# Patient Record
Sex: MALE | Race: WHITE | NOT HISPANIC OR LATINO | Employment: OTHER | URBAN - NONMETROPOLITAN AREA
[De-identification: names, ages, dates, MRNs, and addresses within clinical notes are randomized per-mention and may not be internally consistent; named-entity substitution may affect disease eponyms.]

---

## 2018-06-25 ENCOUNTER — OFFICE VISIT (OUTPATIENT)
Dept: NEUROSURGERY | Facility: CLINIC | Age: 63
End: 2018-06-25

## 2018-06-25 VITALS — HEIGHT: 68 IN | BODY MASS INDEX: 44.56 KG/M2 | WEIGHT: 294 LBS

## 2018-06-25 DIAGNOSIS — M54.2 NECK PAIN: Primary | ICD-10-CM

## 2018-06-25 DIAGNOSIS — F17.200 SMOKER: ICD-10-CM

## 2018-06-25 DIAGNOSIS — G44.229 CHRONIC TENSION-TYPE HEADACHE, NOT INTRACTABLE: ICD-10-CM

## 2018-06-25 PROCEDURE — 99204 OFFICE O/P NEW MOD 45 MIN: CPT | Performed by: NURSE PRACTITIONER

## 2018-06-25 RX ORDER — CLOPIDOGREL BISULFATE 75 MG/1
75 TABLET ORAL DAILY
COMMUNITY

## 2018-06-25 RX ORDER — VALSARTAN 80 MG/1
80 TABLET ORAL DAILY
COMMUNITY
End: 2020-10-14 | Stop reason: HOSPADM

## 2018-06-25 RX ORDER — PREGABALIN 75 MG/1
75 CAPSULE ORAL
Status: ON HOLD | COMMUNITY
End: 2020-10-12

## 2018-06-25 RX ORDER — POTASSIUM CHLORIDE 20 MEQ/1
40 TABLET, EXTENDED RELEASE ORAL
Status: ON HOLD | COMMUNITY
End: 2020-10-12

## 2018-06-25 RX ORDER — SPIRONOLACTONE 25 MG/1
25 TABLET ORAL 2 TIMES DAILY
COMMUNITY
End: 2020-10-14 | Stop reason: HOSPADM

## 2018-06-25 RX ORDER — TRIAMTERENE AND HYDROCHLOROTHIAZIDE 37.5; 25 MG/1; MG/1
1 CAPSULE ORAL EVERY MORNING
COMMUNITY
End: 2020-10-14 | Stop reason: HOSPADM

## 2018-06-25 RX ORDER — METOPROLOL SUCCINATE 50 MG/1
50 TABLET, EXTENDED RELEASE ORAL
Status: ON HOLD | COMMUNITY
End: 2020-10-12

## 2018-06-25 RX ORDER — MULTIPLE VITAMINS W/ MINERALS TAB 9MG-400MCG
1 TAB ORAL DAILY
COMMUNITY

## 2018-06-25 RX ORDER — CYCLOBENZAPRINE HCL 10 MG
10 TABLET ORAL 3 TIMES DAILY PRN
Qty: 90 TABLET | Refills: 0 | Status: SHIPPED | OUTPATIENT
Start: 2018-06-25 | End: 2019-10-30

## 2018-06-25 RX ORDER — ASPIRIN 81 MG/1
81 TABLET ORAL
COMMUNITY
End: 2020-10-14 | Stop reason: HOSPADM

## 2018-06-25 RX ORDER — ATORVASTATIN CALCIUM 80 MG/1
80 TABLET, FILM COATED ORAL NIGHTLY
COMMUNITY

## 2018-06-25 RX ORDER — OMEPRAZOLE 20 MG/1
20 CAPSULE, DELAYED RELEASE ORAL DAILY
COMMUNITY

## 2018-06-25 NOTE — PATIENT INSTRUCTIONS

## 2018-06-25 NOTE — PROGRESS NOTES
"Neurosurgery Initial Patient Visit    Patient: Manuel Clark  : 1955    Primary Care Provider: JASSON Russell    Requesting Provider: JASSON Russell      History    Chief Complaint:   Chief Complaint   Patient presents with   • Neck Pain     Pt states that he has had pain for a couple of months that is getting worse.  He is having a hard time sleeping.  He denies any injury.  Pt denies any PM, PT or chicropractor       History of Present Illness: He is a 63-year-old  male who presents with chief complaint of \"sore neck.\"  He is referred by JASSON Russell, and we have been asked to see the patient in consultation for further evaluation.    He gives history of a more acute, insidious onset of neck pain for about 2 months.  He does not really give any prior history.  He seems to have a difficult time at night, when he is trying to go to sleep.  Lying down with his head on his pillow seems to make his pain much worse.  His pain is primarily in his neck and he shows me the left occiput and left lateral aspect is worse.  He sometimes feels a tingling sensation radiating into his left trapezius muscle.  He has been having some headache and feels these are very possible tension headaches.  He has been giving some thought to seeing a chiropractor, but wishes to have an x-ray of his neck done first.  He brings that in on disc for review.  He has been using over-the-counter pain medication typically using 4 Advil or Aleve tablets and for Tylenol daily.  He feels that this helps him better than an anti-inflammatory medicine he had been prescribed.  He is not at all interested in any type of pain medication as they have caused him to have very vivid dreams in the past.  He explains that he has some nerve damage in his left forearm and hand secondary to a compound fracture years ago.  Presently, he does not relate any other radicular features to either upper extremity.    Allergies:   " "Shellfish-derived products; Iodine; and Sulfa antibiotics    Past Medical History:    Past Medical History:   Diagnosis Date   • Arthritis    • Diabetes mellitus    • Diabetic neuropathy     both feet   • Hypertension    • Nerve damage     L forearm/hand secondary to compound fx       Past Surgical History:   Past Surgical History:   Procedure Laterality Date   • CARDIAC SURGERY      5 stents   • CARPAL TUNNEL RELEASE     • KNEE SURGERY Right    • OTHER SURGICAL HISTORY      compound fracture of the L arm       Medications:  Diovan, Lipitor, Metoprolol, Lantus, Plavix, Janumet, Lyrica, K-dur.  No current outpatient prescriptions on file prior to visit.     No current facility-administered medications on file prior to visit.         Social History:   reports that he has been smoking.  He has been smoking about 0.50 packs per day. He has never used smokeless tobacco. He reports that he drinks alcohol. He reports that he does not use drugs.  He is  and they live in [facility].  He is retired from being a truck and .  With regards to smoking cessation, he very truthfully answers, \"I doubt it.\"  With regards to alcohol use, he indicates this is more infrequent, approximately 2 drinks per month.    Family History:    Family History   Problem Relation Age of Onset   • Stroke Mother    • Cancer Mother         breast cancer x2   • Diabetes Mother    • Heart disease Mother    • Hypertension Mother    • Heart disease Father    • Arthritis Father    • Hypertension Father        Review of Systems:  Review of Systems   Constitutional: Negative for chills, fever and unexpected weight change.   HENT: Positive for hearing loss and postnasal drip. Negative for congestion, rhinorrhea, sore throat and tinnitus.    Eyes: Negative for photophobia and visual disturbance.   Respiratory: Positive for shortness of breath and wheezing. Negative for cough.    Cardiovascular: Positive for leg swelling. Negative " for chest pain and palpitations.   Gastrointestinal: Negative for constipation, diarrhea, nausea and vomiting.   Genitourinary: Negative for difficulty urinating.   Musculoskeletal: Positive for neck pain and neck stiffness. Negative for arthralgias, back pain and gait problem.   Skin: Negative for rash.   Allergic/Immunologic: Negative for environmental allergies.   Neurological: Positive for headaches. Negative for seizures and numbness.   Hematological: Does not bruise/bleed easily.   Psychiatric/Behavioral: Negative for confusion. The patient is not nervous/anxious.    All other systems reviewed and are negative.        Neurological Physical Examination    Physical Exam   Constitutional: He is oriented to person, place, and time. He appears well-developed. No distress.   He is generally pleasant and cooperative, in no acute distress.  He is a bit over nourished.   HENT:   Head: Normocephalic and atraumatic.   Eyes: No scleral icterus.   Neck: Neck supple. No tracheal deviation and normal range of motion present.   Cardiovascular: Normal rate, regular rhythm and normal heart sounds.    No murmur heard.  No murmur or carotid bruit appreciated.  Heart sounds are more distant.   Pulmonary/Chest: Effort normal and breath sounds normal. No respiratory distress. He has no wheezes.   Lung fields are congested with scattered rhonchi that clear with cough.  They are then more coarse, but generally clear throughout.   Musculoskeletal:   Cervical range of motion is limited and elicits a sensation of crepitus with all movement.  There is very good, symmetric strength of both upper extremities.  No focal weakness appreciated.  Deep tendon reflexes diminished bilaterally.  Elo's is negative.   Neurological: He is alert and oriented to person, place, and time. He displays normal reflexes. No cranial nerve deficit.   Optic discs not visualized.   Skin: Skin is warm and dry. No rash noted.   Psychiatric: He has a normal mood  and affect. His speech is normal and behavior is normal. Cognition and memory are normal.   Vitals reviewed.       Medical Decision Making    Management Options:  In the office we have discussed his care.  I have reviewed a cervical x-ray on disc.  This primarily shows some arthritic and degenerative change, more mild.  This appears to be more prominent at the C5 6 and C6 7 levels.  Per radiology report, there is also incidental notation of carotid artery calcification.    We have discussed the findings as well as continued plan of care.  He tells me that his wife has had significant issues with her neck as well as multiple surgeries, so he is somewhat familiar.  He also wishes to be very conservative in his care.  He has already been thinking about seeing a chiropractor, but again, had wished to have an x-ray reviewed first.  We have ultimately agreed on a prescription for physical therapy which we will order 3 days a week for the next 4 weeks.  Depending upon his response, then he may want to try chiropractic treatment and this is certainly reasonable.  We have also reviewed medications.  He is most comfortable continuing something over-the-counter and I am advised him to take no more than what he is presently using.  So hoping for this to be a shorter term needed.  We have talked about adding a muscle relaxer to use as needed and he is agreeable.  We will try Flexeril.  As far as the finding of calcification of the carotid arteries, the patient is already aware and is planning to talk to his cardiologist as his upcoming, routine appointment later this week.  From our standpoint, we will see him back within 4-6 weeks to follow-up from conservative treatment of therapy and medication.  If he should have any new or worsening problems, I have asked him to let us know and we will move his appointment to a sooner date.  This is all agreeable.    Imaging is here in the office on file for future review as  needed.    Information regarding BMI and smoking cessation has been given in an effort to help support overall health and wellness.    Manuel was seen today for neck pain.    Diagnoses and all orders for this visit:    Neck pain  -     Ambulatory Referral to Physical Therapy Evaluate and treat (3x a week for 4 weeks), Neuro, Ortho  -     cyclobenzaprine (FLEXERIL) 10 MG tablet; Take 1 tablet by mouth 3 (Three) Times a Day As Needed for Muscle Spasms.    Chronic tension-type headache, not intractable  -     Ambulatory Referral to Physical Therapy Evaluate and treat (3x a week for 4 weeks), Neuro, Ortho  -     cyclobenzaprine (FLEXERIL) 10 MG tablet; Take 1 tablet by mouth 3 (Three) Times a Day As Needed for Muscle Spasms.    BMI 40.0-44.9, adult    Smoker        Thank you, No ref. provider found for this Consultation and the opportunity to participate in the care of this pleasant patient.

## 2019-09-03 ENCOUNTER — TRANSCRIBE ORDERS (OUTPATIENT)
Dept: ADMINISTRATIVE | Facility: HOSPITAL | Age: 64
End: 2019-09-03

## 2019-09-03 DIAGNOSIS — G58.9 PINCHED NERVE IN NECK: Primary | ICD-10-CM

## 2019-09-06 ENCOUNTER — HOSPITAL ENCOUNTER (OUTPATIENT)
Dept: MRI IMAGING | Facility: HOSPITAL | Age: 64
Discharge: HOME | End: 2019-09-06

## 2019-09-06 DIAGNOSIS — G58.9 PINCHED NERVE IN NECK: ICD-10-CM

## 2019-09-16 ENCOUNTER — HOSPITAL ENCOUNTER (OUTPATIENT)
Dept: MRI IMAGING | Facility: HOSPITAL | Age: 64
Discharge: HOME | End: 2019-09-16
Admitting: NURSE PRACTITIONER

## 2019-09-16 PROCEDURE — 72141 MRI NECK SPINE W/O DYE: CPT

## 2019-10-30 ENCOUNTER — OFFICE VISIT (OUTPATIENT)
Dept: NEUROSURGERY | Facility: CLINIC | Age: 64
End: 2019-10-30

## 2019-10-30 ENCOUNTER — HOSPITAL ENCOUNTER (OUTPATIENT)
Dept: GENERAL RADIOLOGY | Facility: HOSPITAL | Age: 64
Discharge: HOME | End: 2019-10-30
Admitting: NURSE PRACTITIONER

## 2019-10-30 VITALS
SYSTOLIC BLOOD PRESSURE: 128 MMHG | WEIGHT: 289.6 LBS | BODY MASS INDEX: 43.89 KG/M2 | HEIGHT: 68 IN | DIASTOLIC BLOOD PRESSURE: 85 MMHG

## 2019-10-30 DIAGNOSIS — F17.200 SMOKER: ICD-10-CM

## 2019-10-30 DIAGNOSIS — M48.02 SPINAL STENOSIS IN CERVICAL REGION: ICD-10-CM

## 2019-10-30 DIAGNOSIS — M48.02 SPINAL STENOSIS IN CERVICAL REGION: Primary | ICD-10-CM

## 2019-10-30 PROCEDURE — 99214 OFFICE O/P EST MOD 30 MIN: CPT | Performed by: NURSE PRACTITIONER

## 2019-10-30 PROCEDURE — 72052 X-RAY EXAM NECK SPINE 6/>VWS: CPT

## 2019-10-30 NOTE — PROGRESS NOTES
Chief complaint:   Chief Complaint   Patient presents with   • Neck Pain     Manuel is returning to our office today after an MRI of his cervical in September here at [facility] for follow up for his neck pain with left shoulder and left arm and hand pain with numbness.  No recent physical therapy.         Subjective     HPI: This is a 64-year-old male gentleman who was referred to us by JASSON Mccollum for neck and left arm pain.  He is here to be evaluated today.  He states that the pain in his neck has been going on for over a year and has been progressively getting worse.  The pain is constant.  It is worse with looking down and better with certain positions.  He has pain that will radiate down his left arm in a radicular fashion to his hand.  This pain is constant and has the same modifying factors.  He denies any bowel or bladder incontinence.  He has not done any recent physical therapy, chiropractic care, or pain management injections.  Rates his pain on a scale of 0-10 out of 7.  He says it does interfere with his actives of daily living.  He is right-hand dominant.  He is retired.  He is .  He does smoke half a pack of cigarettes a day.  He drinks rarely.  Denies any illicit drug use.  He does use a cane but states this is for knee issue.  Medical history includes diabetes along with congestive heart failure and hypertension.    Review of Systems   Constitutional: Positive for fatigue.   HENT: Positive for postnasal drip.    Respiratory: Positive for apnea, shortness of breath and wheezing.    Cardiovascular: Positive for leg swelling.   Endocrine: Positive for cold intolerance.   Musculoskeletal: Positive for neck pain and neck stiffness.   Neurological: Positive for numbness and headaches.   Psychiatric/Behavioral: Positive for sleep disturbance.   All other systems reviewed and are negative.       Past Medical History:   Diagnosis Date   • Arthritis    • Diabetes mellitus (CMS/[facility])    • Diabetic  "neuropathy (CMS/HCC)     both feet   • Hypertension    • Nerve damage     L forearm/hand secondary to compound fx     Past Surgical History:   Procedure Laterality Date   • CARDIAC SURGERY      5 stents   • CARPAL TUNNEL RELEASE     • KNEE SURGERY Right    • OTHER SURGICAL HISTORY      compound fracture of the L arm     Family History   Problem Relation Age of Onset   • Stroke Mother    • Cancer Mother         breast cancer x2   • Diabetes Mother    • Heart disease Mother    • Hypertension Mother    • Heart disease Father    • Arthritis Father    • Hypertension Father      Social History     Tobacco Use   • Smoking status: Current Every Day Smoker     Packs/day: 0.50   • Smokeless tobacco: Never Used   Substance Use Topics   • Alcohol use: Yes     Comment: 2 drinks per month   • Drug use: No       (Not in a hospital admission)  Allergies:  Shellfish-derived products; Iodine; and Sulfa antibiotics    Objective      Vital Signs  /85 (BP Location: Right arm, Patient Position: Sitting)   Ht 172.7 cm (68\")   Wt 131 kg (289 lb 9.6 oz)   BMI 44.03 kg/m²     Physical Exam   Constitutional: He is oriented to person, place, and time. He appears well-developed and well-nourished.   HENT:   Head: Normocephalic.   Eyes: Conjunctivae, EOM and lids are normal. Pupils are equal, round, and reactive to light.   Neck: Normal range of motion.   Cardiovascular: Normal rate, regular rhythm and normal heart sounds.   Pulmonary/Chest: Effort normal and breath sounds normal.   Abdominal: Normal appearance.   Musculoskeletal: Normal range of motion.        Cervical back: He exhibits pain.   Neurological: He is alert and oriented to person, place, and time. He has normal strength and normal reflexes. He displays normal reflexes. No cranial nerve deficit or sensory deficit. GCS eye subscore is 4. GCS verbal subscore is 5. GCS motor subscore is 6.   Skin: Skin is warm.   Psychiatric: He has a normal mood and affect. His speech is " normal and behavior is normal. Thought content normal. Cognition and memory are normal.       Results Review: MRI of the cervical spine that was done on September 16, 2019 shows the patient does have bilateral neuroforaminal narrowing at C3-4.  At C6-7 there is mild central canal narrowing with bilateral neuroforaminal narrowing as well.  There is loss of the normal cervical lordosis.  No fracture visualized.  No cord signal change        Assessment/Plan:   For first line conservative care of cervical pain, I would like to send Mr. Clakr for a dedicated course of physician directed physical therapy consisting of 2-3 times a week for 4-6 weeks.  I am also going to send him for set of x-rays of his cervical spine to include standing flexion and extension.  I am also going to set him up to go to pain management to discuss pain management with possibly both oral pain medication and injections with Dr. Marlene Peter.  The patient thinks that he would like for surgery to be a last resort due to some of his other medical issues that he is dealing with and he does not feel that his cardiologist would clear him for surgical clearance at this time.    Return for reassessment with Dr. Flowers      I advised the patient to call and return sooner for new or worsening complaints of weakness, paresthesias, gait disturbances, or any additional concerns.  Treatment options discussed in detail with Manuel and the patient voiced understanding.  Mr. Clark agrees with this plan of care.     Patient is a smoker.  Smoking cessation classes given to the patient  BMI shows the patient is Obese. BMI chart was given to the patient.     Manuel was seen today for neck pain.    Diagnoses and all orders for this visit:    Spinal stenosis in cervical region  -     Ambulatory Referral to Physical Therapy Evaluate and treat (2-3 days a week for 4-6 weeks )  -     XR Spine Cervical Complete With Obli Flex Ext; Future  -     Ambulatory Referral  to Pain Management    BMI 40.0-44.9, adult (CMS/HCC)    Smoker          I discussed the patients findings and my recommendations with patient    Jj Barlow, APRN  10/30/19  1:44 PM

## 2019-10-30 NOTE — PATIENT INSTRUCTIONS
Steps to Quit Smoking    Smoking tobacco can be bad for your health. It can also affect almost every organ in your body. Smoking puts you and people around you at risk for many serious long-lasting (chronic) diseases. Quitting smoking is hard, but it is one of the best things that you can do for your health. It is never too late to quit.  What are the benefits of quitting smoking?  When you quit smoking, you lower your risk for getting serious diseases and conditions. They can include:  · Lung cancer or lung disease.  · Heart disease.  · Stroke.  · Heart attack.  · Not being able to have children (infertility).  · Weak bones (osteoporosis) and broken bones (fractures).  If you have coughing, wheezing, and shortness of breath, those symptoms may get better when you quit. You may also get sick less often. If you are pregnant, quitting smoking can help to lower your chances of having a baby of low birth weight.  What can I do to help me quit smoking?  Talk with your doctor about what can help you quit smoking. Some things you can do (strategies) include:  · Quitting smoking totally, instead of slowly cutting back how much you smoke over a period of time.  · Going to in-person counseling. You are more likely to quit if you go to many counseling sessions.  · Using resources and support systems, such as:  ? Online chats with a counselor.  ? Phone quitlines.  ? Printed self-help materials.  ? Support groups or group counseling.  ? Text messaging programs.  ? Mobile phone apps or applications.  · Taking medicines. Some of these medicines may have nicotine in them. If you are pregnant or breastfeeding, do not take any medicines to quit smoking unless your doctor says it is okay. Talk with your doctor about counseling or other things that can help you.  Talk with your doctor about using more than one strategy at the same time, such as taking medicines while you are also going to in-person counseling. This can help make  quitting easier.  What things can I do to make it easier to quit?  Quitting smoking might feel very hard at first, but there is a lot that you can do to make it easier. Take these steps:  · Talk to your family and friends. Ask them to support and encourage you.  · Call phone quitlines, reach out to support groups, or work with a counselor.  · Ask people who smoke to not smoke around you.  · Avoid places that make you want (trigger) to smoke, such as:  ? Bars.  ? Parties.  ? Smoke-break areas at work.  · Spend time with people who do not smoke.  · Lower the stress in your life. Stress can make you want to smoke. Try these things to help your stress:  ? Getting regular exercise.  ? Deep-breathing exercises.  ? Yoga.  ? Meditating.  ? Doing a body scan. To do this, close your eyes, focus on one area of your body at a time from head to toe, and notice which parts of your body are tense. Try to relax the muscles in those areas.  · Download or buy apps on your mobile phone or tablet that can help you stick to your quit plan. There are many free apps, such as QuitGuide from the CDC (Centers for Disease Control and Prevention). You can find more support from smokefree.gov and other websites.  This information is not intended to replace advice given to you by your health care provider. Make sure you discuss any questions you have with your health care provider.  Document Released: 10/14/2010 Document Revised: 08/15/2017 Document Reviewed: 05/03/2016  [employer] Interactive Patient Education © 2019 [employer]  BMI for Adults    Body mass index (BMI) is a number that is calculated from a person's weight and height. BMI may help to estimate how much of a person's weight is composed of fat. BMI can help identify those who may be at higher risk for certain medical problems.  How is BMI used with adults?  BMI is used as a screening tool to identify possible weight problems. It is used to check whether a person is obese,  "overweight, healthy weight, or underweight.  How is BMI calculated?  BMI measures your weight and compares it to your height. This can be done either in English ([place] or metric measurements. Note that charts are available to help you find your BMI quickly and easily without having to do these calculations yourself.  To calculate your BMI in English ([place] measurements, your health care provider will:  1. Measure your weight in pounds (lb).  2. Multiply the number of pounds by 703.  ? For example, for a person who weighs 180 lb, multiply that number by 703, which equals 126,540.  3. Measure your height in inches (in). Then multiply that number by itself to get a measurement called \"inches squared.\"  ? For example, for a person who is 70 in tall, the \"inches squared\" measurement is 70 in x 70 in, which equals 4900 inches squared.  4. Divide the total from Step 2 (number of lb x 703) by the total from Step 3 (inches squared): 126,540 ÷ 4900 = 25.8. This is your BMI.  To calculate your BMI in metric measurements, your health care provider will:  1. Measure your weight in kilograms (kg).  2. Measure your height in meters (m). Then multiply that number by itself to get a measurement called \"meters squared.\"  ? For example, for a person who is 1.75 m tall, the \"meters squared\" measurement is 1.75 m x 1.75 m, which is equal to 3.1 meters squared.  3. Divide the number of kilograms (your weight) by the meters squared number. In this example: 70 ÷ 3.1 = 22.6. This is your BMI.  How is BMI interpreted?  To interpret your results, your health care provider will use BMI charts to identify whether you are underweight, normal weight, overweight, or obese. The following guidelines will be used:  · Underweight: BMI less than 18.5.  · Normal weight: BMI between 18.5 and 24.9.  · Overweight: BMI between 25 and 29.9.  · Obese: BMI of 30 and above.  Please note:  · Weight includes both fat and muscle, so someone with a muscular build, " such as an athlete, may have a BMI that is higher than 24.9. In cases like these, BMI is not an accurate measure of body fat.  · To determine if excess body fat is the cause of a BMI of 25 or higher, further assessments may need to be done by a health care provider.  · BMI is usually interpreted in the same way for men and women.  Why is BMI a useful tool?  BMI is useful in two ways:  · Identifying a weight problem that may be related to a medical condition, or that may increase the risk for medical problems.  · Promoting lifestyle and diet changes in order to reach a healthy weight.  Summary  · Body mass index (BMI) is a number that is calculated from a person's weight and height.  · BMI may help to estimate how much of a person's weight is composed of fat. BMI can help identify those who may be at higher risk for certain medical problems.  · BMI can be measured using English measurements or metric measurements.  · To interpret your results, your health care provider will use BMI charts to identify whether you are underweight, normal weight, overweight, or obese.  This information is not intended to replace advice given to you by your health care provider. Make sure you discuss any questions you have with your health care provider.  Document Released: 08/29/2005 Document Revised: 10/31/2018 Document Reviewed: 10/31/2018  [employer] Interactive Patient Education © 2019 [employer]

## 2020-02-06 ENCOUNTER — OFFICE VISIT (OUTPATIENT)
Dept: NEUROSURGERY | Facility: CLINIC | Age: 65
End: 2020-02-06

## 2020-02-06 VITALS
BODY MASS INDEX: 44.41 KG/M2 | SYSTOLIC BLOOD PRESSURE: 140 MMHG | WEIGHT: 293 LBS | HEIGHT: 68 IN | DIASTOLIC BLOOD PRESSURE: 82 MMHG

## 2020-02-06 DIAGNOSIS — R20.2 NUMBNESS AND TINGLING OF LEFT UPPER EXTREMITY: ICD-10-CM

## 2020-02-06 DIAGNOSIS — M48.02 SPINAL STENOSIS IN CERVICAL REGION: Primary | ICD-10-CM

## 2020-02-06 DIAGNOSIS — R20.0 NUMBNESS AND TINGLING OF LEFT UPPER EXTREMITY: ICD-10-CM

## 2020-02-06 DIAGNOSIS — F17.200 SMOKER: ICD-10-CM

## 2020-02-06 PROCEDURE — 99214 OFFICE O/P EST MOD 30 MIN: CPT | Performed by: NEUROLOGICAL SURGERY

## 2020-02-06 RX ORDER — FENOFIBRIC ACID 135 MG/1
135 CAPSULE, DELAYED RELEASE ORAL DAILY
COMMUNITY
Start: 2019-11-25

## 2020-02-06 RX ORDER — FUROSEMIDE 40 MG/1
40 TABLET ORAL DAILY
COMMUNITY
Start: 2019-12-16 | End: 2020-05-12

## 2020-02-06 RX ORDER — TRAMADOL HYDROCHLORIDE 50 MG/1
TABLET ORAL
COMMUNITY
Start: 2019-11-15 | End: 2020-02-06

## 2020-02-06 RX ORDER — OMEGA-3-ACID ETHYL ESTERS 1 G/1
1 CAPSULE, LIQUID FILLED ORAL 4 TIMES DAILY
COMMUNITY
Start: 2019-12-16 | End: 2020-10-14 | Stop reason: HOSPADM

## 2020-02-06 RX ORDER — ERTUGLIFLOZIN 5 MG/1
5 TABLET, FILM COATED ORAL DAILY
COMMUNITY
Start: 2019-11-28 | End: 2020-05-12

## 2020-02-06 RX ORDER — TIZANIDINE 4 MG/1
4 TABLET ORAL 3 TIMES DAILY PRN
Status: ON HOLD | COMMUNITY
Start: 2019-12-16 | End: 2020-10-12

## 2020-02-06 NOTE — PATIENT INSTRUCTIONS
"PATIENT TO CONTINUE TO FOLLOW UP WITH HIS PRIMARY CARE PROVIDER FOR YEARLY PHYSICAL EXAMS TO ENSURE COMPLETE HEALTH MAINTENANCE        BMI for Adults    Body mass index (BMI) is a number that is calculated from a person's weight and height. BMI may help to estimate how much of a person's weight is composed of fat. BMI can help identify those who may be at higher risk for certain medical problems.  How is BMI used with adults?  BMI is used as a screening tool to identify possible weight problems. It is used to check whether a person is obese, overweight, healthy weight, or underweight.  How is BMI calculated?  BMI measures your weight and compares it to your height. This can be done either in English ([place] or metric measurements. Note that charts are available to help you find your BMI quickly and easily without having to do these calculations yourself.  To calculate your BMI in English ([place] measurements, your health care provider will:  1. Measure your weight in pounds (lb).  2. Multiply the number of pounds by 703.  ? For example, for a person who weighs 180 lb, multiply that number by 703, which equals 126,540.  3. Measure your height in inches (in). Then multiply that number by itself to get a measurement called \"inches squared.\"  ? For example, for a person who is 70 in tall, the \"inches squared\" measurement is 70 in x 70 in, which equals 4900 inches squared.  4. Divide the total from Step 2 (number of lb x 703) by the total from Step 3 (inches squared): 126,540 ÷ 4900 = 25.8. This is your BMI.  To calculate your BMI in metric measurements, your health care provider will:  1. Measure your weight in kilograms (kg).  2. Measure your height in meters (m). Then multiply that number by itself to get a measurement called \"meters squared.\"  ? For example, for a person who is 1.75 m tall, the \"meters squared\" measurement is 1.75 m x 1.75 m, which is equal to 3.1 meters squared.  3. Divide the number of kilograms " (your weight) by the meters squared number. In this example: 70 ÷ 3.1 = 22.6. This is your BMI.  How is BMI interpreted?  To interpret your results, your health care provider will use BMI charts to identify whether you are underweight, normal weight, overweight, or obese. The following guidelines will be used:  · Underweight: BMI less than 18.5.  · Normal weight: BMI between 18.5 and 24.9.  · Overweight: BMI between 25 and 29.9.  · Obese: BMI of 30 and above.  Please note:  · Weight includes both fat and muscle, so someone with a muscular build, such as an athlete, may have a BMI that is higher than 24.9. In cases like these, BMI is not an accurate measure of body fat.  · To determine if excess body fat is the cause of a BMI of 25 or higher, further assessments may need to be done by a health care provider.  · BMI is usually interpreted in the same way for men and women.  Why is BMI a useful tool?  BMI is useful in two ways:  · Identifying a weight problem that may be related to a medical condition, or that may increase the risk for medical problems.  · Promoting lifestyle and diet changes in order to reach a healthy weight.  Summary  · Body mass index (BMI) is a number that is calculated from a person's weight and height.  · BMI may help to estimate how much of a person's weight is composed of fat. BMI can help identify those who may be at higher risk for certain medical problems.  · BMI can be measured using English measurements or metric measurements.  · To interpret your results, your health care provider will use BMI charts to identify whether you are underweight, normal weight, overweight, or obese.  This information is not intended to replace advice given to you by your health care provider. Make sure you discuss any questions you have with your health care provider.  Document Released: 08/29/2005 Document Revised: 10/31/2018 Document Reviewed: 10/31/2018  [employer]vier Interactive Patient Education © 2019  [employer]        Steps to Quit Smoking    Smoking tobacco can be harmful to your health and can affect almost every organ in your body. Smoking puts you, and those around you, at risk for developing many serious chronic diseases. Quitting smoking is difficult, but it is one of the best things that you can do for your health. It is never too late to quit.  What are the benefits of quitting smoking?  When you quit smoking, you lower your risk of developing serious diseases and conditions, such as:  · Lung cancer or lung disease, such as COPD.  · Heart disease.  · Stroke.  · Heart attack.  · Infertility.  · Osteoporosis and bone fractures.  Additionally, symptoms such as coughing, wheezing, and shortness of breath may get better when you quit. You may also find that you get sick less often because your body is stronger at fighting off colds and infections. If you are pregnant, quitting smoking can help to reduce your chances of having a baby of low birth weight.  How do I get ready to quit?  When you decide to quit smoking, create a plan to make sure that you are successful. Before you quit:  · Pick a date to quit. Set a date within the next two weeks to give you time to prepare.  · Write down the reasons why you are quitting. Keep this list in places where you will see it often, such as on your bathroom mirror or in your car or wallet.  · Identify the people, places, things, and activities that make you want to smoke (triggers) and avoid them. Make sure to take these actions:  ? Throw away all cigarettes at home, at work, and in your car.  ? Throw away smoking accessories, such as ashtrays and lighters.  ? Clean your car and make sure to empty the ashtray.  ? Clean your home, including curtains and carpets.  · Tell your family, friends, and coworkers that you are quitting. Support from your loved ones can make quitting easier.  · Talk with your health care provider about your options for quitting smoking.  · Find  out what treatment options are covered by your health insurance.  What strategies can I use to quit smoking?  Talk with your healthcare provider about different strategies to quit smoking. Some strategies include:  · Quitting smoking altogether instead of gradually lessening how much you smoke over a period of time. Research shows that quitting “cold turkey” is more successful than gradually quitting.  · Attending in-person counseling to help you build problem-solving skills. You are more likely to have success in quitting if you attend several counseling sessions. Even short sessions of 10 minutes can be effective.  · Finding resources and support systems that can help you to quit smoking and remain smoke-free after you quit. These resources are most helpful when you use them often. They can include:  ? Online chats with a counselor.  ? Telephone quitlines.  ? Printed self-help materials.  ? Support groups or group counseling.  ? Text messaging programs.  ? Mobile phone applications.  · Taking medicines to help you quit smoking. (If you are pregnant or breastfeeding, talk with your health care provider first.) Some medicines contain nicotine and some do not. Both types of medicines help with cravings, but the medicines that include nicotine help to relieve withdrawal symptoms. Your health care provider may recommend:  ? Nicotine patches, gum, or lozenges.  ? Nicotine inhalers or sprays.  ? Non-nicotine medicine that is taken by mouth.  Talk with your health care provider about combining strategies, such as taking medicines while you are also receiving in-person counseling. Using these two strategies together makes you more likely to succeed in quitting than if you used either strategy on its own.  If you are pregnant or breastfeeding, talk with your health care provider about finding counseling or other support strategies to quit smoking. Do not take medicine to help you quit smoking unless told to do so by your  health care provider.  What things can I do to make it easier to quit?  Quitting smoking might feel overwhelming at first, but there is a lot that you can do to make it easier. Take these important actions:  · Reach out to your family and friends and ask that they support and encourage you during this time. Call telephone quitlines, reach out to support groups, or work with a counselor for support.  · Ask people who smoke to avoid smoking around you.  · Avoid places that trigger you to smoke, such as bars, parties, or smoke-break areas at work.  · Spend time around people who do not smoke.  · Lessen stress in your life, because stress can be a smoking trigger for some people. To lessen stress, try:  ? Exercising regularly.  ? Deep-breathing exercises.  ? Yoga.  ? Meditating.  ? Performing a body scan. This involves closing your eyes, scanning your body from head to toe, and noticing which parts of your body are particularly tense. Purposefully relax the muscles in those areas.  · Download or purchase mobile phone or tablet apps (applications) that can help you stick to your quit plan by providing reminders, tips, and encouragement. There are many free apps, such as QuitGuide from the CDC (Centers for Disease Control and Prevention). You can find other support for quitting smoking (smoking cessation) through smokefree.gov and other websites.  How will I feel when I quit smoking?  Within the first 24 hours of quitting smoking, you may start to feel some withdrawal symptoms. These symptoms are usually most noticeable 2-3 days after quitting, but they usually do not last beyond 2-3 weeks. Changes or symptoms that you might experience include:  · Mood swings.  · Restlessness, anxiety, or irritation.  · Difficulty concentrating.  · Dizziness.  · Strong cravings for sugary foods in addition to nicotine.  · Mild weight gain.  · Constipation.  · Nausea.  · Coughing or a sore throat.  · Changes in how your medicines work in  your body.  · A depressed mood.  · Difficulty sleeping (insomnia).  After the first 2-3 weeks of quitting, you may start to notice more positive results, such as:  · Improved sense of smell and taste.  · Decreased coughing and sore throat.  · Slower heart rate.  · Lower blood pressure.  · Clearer skin.  · The ability to breathe more easily.  · Fewer sick days.  Quitting smoking is very challenging for most people. Do not get discouraged if you are not successful the first time. Some people need to make many attempts to quit before they achieve long-term success. Do your best to stick to your quit plan, and talk with your health care provider if you have any questions or concerns.  This information is not intended to replace advice given to you by your health care provider. Make sure you discuss any questions you have with your health care provider.  Document Released: 12/12/2002 Document Revised: 07/24/2018 Document Reviewed: 05/03/2016  [employer] Interactive Patient Education © 2019 [employer]

## 2020-02-25 ENCOUNTER — HOSPITAL ENCOUNTER (OUTPATIENT)
Dept: NEUROLOGY | Facility: HOSPITAL | Age: 65
Discharge: HOME | End: 2020-02-25
Admitting: NEUROLOGICAL SURGERY

## 2020-02-25 PROCEDURE — 95909 NRV CNDJ TST 5-6 STUDIES: CPT

## 2020-02-25 PROCEDURE — 95886 MUSC TEST DONE W/N TEST COMP: CPT

## 2020-03-16 ENCOUNTER — TELEPHONE (OUTPATIENT)
Dept: NEUROSURGERY | Facility: CLINIC | Age: 65
End: 2020-03-16

## 2020-03-16 NOTE — TELEPHONE ENCOUNTER
PATIENT CALLED REGARDING THE FOLLOWING APPOINTMENT.  Type: FOLLOW-UP EX   Date: 04/15 AT 9:45AM  Provider: SANDRA GIPSON  Caller: PATIENT  Phone Number: [phone]  Reason: PATIENT CALLED TO RESCHEDULE INITIAL APPT TO A LATER DATE BUT PREFERS TO RECEIVE IMAGING RESULTS AND NEXT STEP OPTIONS OVER THE PHONE IF APPLICABLE.  Availability for callback: ANYTIME  Preferred dates for scheduling: NONE    PLEASE CONTACT PATIENT FOR FURTHER INSTRUCTIONS.

## 2020-04-09 ENCOUNTER — TELEPHONE (OUTPATIENT)
Dept: NEUROSURGERY | Facility: CLINIC | Age: 65
End: 2020-04-09

## 2020-04-09 NOTE — TELEPHONE ENCOUNTER
Jj has requested to get this patient set up on a Dr. Flowers day for a tele video visit thru his My Chart, there was no answer and I have left him a message for a call back.

## 2020-04-09 NOTE — TELEPHONE ENCOUNTER
Patient returned my call and this tele video visit has been scheduled for 04/14/2020 at 9:30 am.

## 2020-04-13 ENCOUNTER — TELEPHONE (OUTPATIENT)
Dept: NEUROSURGERY | Facility: CLINIC | Age: 65
End: 2020-04-13

## 2020-04-13 NOTE — TELEPHONE ENCOUNTER
CALLED MR. TATE TO CONFIRM THAT HE HAS DOWNLOADED [employer] [facility] ON PHONE - HE SAID YES - EXPLAINED PROCEDURE.   TOLD HIM TO CALL IF HE HAD ANY PROBLEMS.

## 2020-04-14 ENCOUNTER — TELEMEDICINE (OUTPATIENT)
Dept: NEUROSURGERY | Facility: CLINIC | Age: 65
End: 2020-04-14

## 2020-04-14 VITALS — WEIGHT: 293 LBS | BODY MASS INDEX: 44.41 KG/M2 | HEIGHT: 68 IN

## 2020-04-14 DIAGNOSIS — R20.2 NUMBNESS AND TINGLING OF LEFT UPPER EXTREMITY: ICD-10-CM

## 2020-04-14 DIAGNOSIS — F17.200 SMOKER: ICD-10-CM

## 2020-04-14 DIAGNOSIS — M48.02 CERVICAL SPINAL STENOSIS: Primary | ICD-10-CM

## 2020-04-14 DIAGNOSIS — G56.23 CUBITAL TUNNEL SYNDROME OF BOTH UPPER EXTREMITIES: ICD-10-CM

## 2020-04-14 DIAGNOSIS — R20.0 NUMBNESS AND TINGLING OF LEFT UPPER EXTREMITY: ICD-10-CM

## 2020-04-14 PROCEDURE — 99213 OFFICE O/P EST LOW 20 MIN: CPT | Performed by: NURSE PRACTITIONER

## 2020-04-14 NOTE — PATIENT INSTRUCTIONS
PATIENT TO CONTINUE TO FOLLOW UP WITH HIS/HER PRIMARY CARE PROVIDER FOR YEARLY PHYSICAL EXAMS TO ENSURE COMPLETE HEALTH MAINTENANCE    Steps to Quit Smoking    Smoking tobacco can be harmful to your health and can affect almost every organ in your body. Smoking puts you, and those around you, at risk for developing many serious chronic diseases. Quitting smoking is difficult, but it is one of the best things that you can do for your health. It is never too late to quit.  What are the benefits of quitting smoking?  When you quit smoking, you lower your risk of developing serious diseases and conditions, such as:  · Lung cancer or lung disease, such as COPD.  · Heart disease.  · Stroke.  · Heart attack.  · Infertility.  · Osteoporosis and bone fractures.  Additionally, symptoms such as coughing, wheezing, and shortness of breath may get better when you quit. You may also find that you get sick less often because your body is stronger at fighting off colds and infections. If you are pregnant, quitting smoking can help to reduce your chances of having a baby of low birth weight.  How do I get ready to quit?  When you decide to quit smoking, create a plan to make sure that you are successful. Before you quit:  · Pick a date to quit. Set a date within the next two weeks to give you time to prepare.  · Write down the reasons why you are quitting. Keep this list in places where you will see it often, such as on your bathroom mirror or in your car or wallet.  · Identify the people, places, things, and activities that make you want to smoke (triggers) and avoid them. Make sure to take these actions:  ? Throw away all cigarettes at home, at work, and in your car.  ? Throw away smoking accessories, such as ashtrays and lighters.  ? Clean your car and make sure to empty the ashtray.  ? Clean your home, including curtains and carpets.  · Tell your family, friends, and coworkers that you are quitting. Support from your loved ones  can make quitting easier.  · Talk with your health care provider about your options for quitting smoking.  · Find out what treatment options are covered by your health insurance.  What strategies can I use to quit smoking?  Talk with your healthcare provider about different strategies to quit smoking. Some strategies include:  · Quitting smoking altogether instead of gradually lessening how much you smoke over a period of time. Research shows that quitting “cold turkey” is more successful than gradually quitting.  · Attending in-person counseling to help you build problem-solving skills. You are more likely to have success in quitting if you attend several counseling sessions. Even short sessions of 10 minutes can be effective.  · Finding resources and support systems that can help you to quit smoking and remain smoke-free after you quit. These resources are most helpful when you use them often. They can include:  ? Online chats with a counselor.  ? Telephone quitlines.  ? Printed self-help materials.  ? Support groups or group counseling.  ? Text messaging programs.  ? Mobile phone applications.  · Taking medicines to help you quit smoking. (If you are pregnant or breastfeeding, talk with your health care provider first.) Some medicines contain nicotine and some do not. Both types of medicines help with cravings, but the medicines that include nicotine help to relieve withdrawal symptoms. Your health care provider may recommend:  ? Nicotine patches, gum, or lozenges.  ? Nicotine inhalers or sprays.  ? Non-nicotine medicine that is taken by mouth.  Talk with your health care provider about combining strategies, such as taking medicines while you are also receiving in-person counseling. Using these two strategies together makes you more likely to succeed in quitting than if you used either strategy on its own.  If you are pregnant or breastfeeding, talk with your health care provider about finding counseling or other  support strategies to quit smoking. Do not take medicine to help you quit smoking unless told to do so by your health care provider.  What things can I do to make it easier to quit?  Quitting smoking might feel overwhelming at first, but there is a lot that you can do to make it easier. Take these important actions:  · Reach out to your family and friends and ask that they support and encourage you during this time. Call telephone quitlines, reach out to support groups, or work with a counselor for support.  · Ask people who smoke to avoid smoking around you.  · Avoid places that trigger you to smoke, such as bars, parties, or smoke-break areas at work.  · Spend time around people who do not smoke.  · Lessen stress in your life, because stress can be a smoking trigger for some people. To lessen stress, try:  ? Exercising regularly.  ? Deep-breathing exercises.  ? Yoga.  ? Meditating.  ? Performing a body scan. This involves closing your eyes, scanning your body from head to toe, and noticing which parts of your body are particularly tense. Purposefully relax the muscles in those areas.  · Download or purchase mobile phone or tablet apps (applications) that can help you stick to your quit plan by providing reminders, tips, and encouragement. There are many free apps, such as QuitGuide from the CDC (Centers for Disease Control and Prevention). You can find other support for quitting smoking (smoking cessation) through smokefree.gov and other websites.  How will I feel when I quit smoking?  Within the first 24 hours of quitting smoking, you may start to feel some withdrawal symptoms. These symptoms are usually most noticeable 2-3 days after quitting, but they usually do not last beyond 2-3 weeks. Changes or symptoms that you might experience include:  · Mood swings.  · Restlessness, anxiety, or irritation.  · Difficulty concentrating.  · Dizziness.  · Strong cravings for sugary foods in addition to nicotine.  · Mild  weight gain.  · Constipation.  · Nausea.  · Coughing or a sore throat.  · Changes in how your medicines work in your body.  · A depressed mood.  · Difficulty sleeping (insomnia).  After the first 2-3 weeks of quitting, you may start to notice more positive results, such as:  · Improved sense of smell and taste.  · Decreased coughing and sore throat.  · Slower heart rate.  · Lower blood pressure.  · Clearer skin.  · The ability to breathe more easily.  · Fewer sick days.  Quitting smoking is very challenging for most people. Do not get discouraged if you are not successful the first time. Some people need to make many attempts to quit before they achieve long-term success. Do your best to stick to your quit plan, and talk with your health care provider if you have any questions or concerns.  This information is not intended to replace advice given to you by your health care provider. Make sure you discuss any questions you have with your health care provider.  Document Released: 12/12/2002 Document Revised: 07/24/2018 Document Reviewed: 05/03/2016  [employer] Interactive Patient Education © 2020 [employer]      Advance Directive    Advance directives are legal documents that let you make choices ahead of time about your health care and medical treatment in case you become unable to communicate for yourself. Advance directives are a way for you to communicate your wishes to family, friends, and health care providers. This can help convey your decisions about end-of-life care if you become unable to communicate.  Discussing and writing advance directives should happen over time rather than all at once. Advance directives can be changed depending on your situation and what you want, even after you have signed the advance directives.  If you do not have an advance directive, some states assign family decision makers to act on your behalf based on how closely you are related to them. Each state has its own laws regarding  advance directives. You may want to check with your health care provider, , or state representative about the laws in your state. There are different types of advance directives, such as:  · Medical power of .  · Living will.  · Do not resuscitate (DNR) or do not attempt resuscitation (DNAR) order.  Health care proxy and medical power of   A health care proxy, also called a health care agent, is a person who is appointed to make medical decisions for you in cases in which you are unable to make the decisions yourself. Generally, people choose someone they know well and trust to represent their preferences. Make sure to ask this person for an agreement to act as your proxy. A proxy may have to exercise judgment in the event of a medical decision for which your wishes are not known.  A medical power of  is a legal document that names your health care proxy. Depending on the laws in your state, after the document is written, it may also need to be:  · Signed.  · Notarized.  · Dated.  · Copied.  · Witnessed.  · Incorporated into your medical record.  You may also want to appoint someone to manage your financial affairs in a situation in which you are unable to do so. This is called a durable power of  for finances. It is a separate legal document from the durable power of  for health care. You may choose the same person or someone different from your health care proxy to act as your agent in financial matters.  If you do not appoint a proxy, or if there is a concern that the proxy is not acting in your best interests, a court-appointed guardian may be designated to act on your behalf.  Living will  A living will is a set of instructions documenting your wishes about medical care when you cannot express them yourself. Health care providers should keep a copy of your living will in your medical record. You may want to give a copy to family members or friends. To alert  caregivers in case of an emergency, you can place a card in your wallet to let them know that you have a living will and where they can find it. A living will is used if you become:  · Terminally ill.  · Incapacitated.  · Unable to communicate or make decisions.  Items to consider in your living will include:  · The use or non-use of life-sustaining equipment, such as dialysis machines and breathing machines (ventilators).  · A DNR or DNAR order, which is the instruction not to use cardiopulmonary resuscitation (CPR) if breathing or heartbeat stops.  · The use or non-use of tube feeding.  · Withholding of food and fluids.  · Comfort (palliative) care when the goal becomes comfort rather than a cure.  · Organ and tissue donation.  A living will does not give instructions for distributing your money and property if you should pass away. It is recommended that you seek the advice of a  when writing a will. Decisions about taxes, beneficiaries, and asset distribution will be legally binding. This process can relieve your family and friends of any concerns surrounding disputes or questions that may come up about the distribution of your assets.  DNR or DNAR  A DNR or DNAR order is a request not to have CPR in the event that your heart stops beating or you stop breathing. If a DNR or DNAR order has not been made and shared, a health care provider will try to help any patient whose heart has stopped or who has stopped breathing. If you plan to have surgery, talk with your health care provider about how your DNR or DNAR order will be followed if problems occur.  Summary  · Advance directives are the legal documents that allow you to make choices ahead of time about your health care and medical treatment in case you become unable to communicate for yourself.  · The process of discussing and writing advance directives should happen over time. You can change the advance directives, even after you have signed  "them.  · Advance directives include DNR or DNAR orders, living hester, and designating an agent as your medical power of .  This information is not intended to replace advice given to you by your health care provider. Make sure you discuss any questions you have with your health care provider.  Document Released: 03/26/2009 Document Revised: 11/06/2017 Document Reviewed: 11/06/2017  [employer] Interactive Patient Education © 2019 [employer]    BMI for Adults    Body mass index (BMI) is a number that is calculated from a person's weight and height. BMI may help to estimate how much of a person's weight is composed of fat. BMI can help identify those who may be at higher risk for certain medical problems.  How is BMI used with adults?  BMI is used as a screening tool to identify possible weight problems. It is used to check whether a person is obese, overweight, healthy weight, or underweight.  How is BMI calculated?  BMI measures your weight and compares it to your height. This can be done either in English ([place] or metric measurements. Note that charts are available to help you find your BMI quickly and easily without having to do these calculations yourself.  To calculate your BMI in English ([place] measurements, your health care provider will:  1. Measure your weight in pounds (lb).  2. Multiply the number of pounds by 703.  ? For example, for a person who weighs 180 lb, multiply that number by 703, which equals 126,540.  3. Measure your height in inches (in). Then multiply that number by itself to get a measurement called \"inches squared.\"  ? For example, for a person who is 70 in tall, the \"inches squared\" measurement is 70 in x 70 in, which equals 4900 inches squared.  4. Divide the total from Step 2 (number of lb x 703) by the total from Step 3 (inches squared): 126,540 ÷ 4900 = 25.8. This is your BMI.  To calculate your BMI in metric measurements, your health care provider will:  1. Measure your " "weight in kilograms (kg).  2. Measure your height in meters (m). Then multiply that number by itself to get a measurement called \"meters squared.\"  ? For example, for a person who is 1.75 m tall, the \"meters squared\" measurement is 1.75 m x 1.75 m, which is equal to 3.1 meters squared.  3. Divide the number of kilograms (your weight) by the meters squared number. In this example: 70 ÷ 3.1 = 22.6. This is your BMI.  How is BMI interpreted?  To interpret your results, your health care provider will use BMI charts to identify whether you are underweight, normal weight, overweight, or obese. The following guidelines will be used:  · Underweight: BMI less than 18.5.  · Normal weight: BMI between 18.5 and 24.9.  · Overweight: BMI between 25 and 29.9.  · Obese: BMI of 30 and above.  Please note:  · Weight includes both fat and muscle, so someone with a muscular build, such as an athlete, may have a BMI that is higher than 24.9. In cases like these, BMI is not an accurate measure of body fat.  · To determine if excess body fat is the cause of a BMI of 25 or higher, further assessments may need to be done by a health care provider.  · BMI is usually interpreted in the same way for men and women.  Why is BMI a useful tool?  BMI is useful in two ways:  · Identifying a weight problem that may be related to a medical condition, or that may increase the risk for medical problems.  · Promoting lifestyle and diet changes in order to reach a healthy weight.  Summary  · Body mass index (BMI) is a number that is calculated from a person's weight and height.  · BMI may help to estimate how much of a person's weight is composed of fat. BMI can help identify those who may be at higher risk for certain medical problems.  · BMI can be measured using English measurements or metric measurements.  · To interpret your results, your health care provider will use BMI charts to identify whether you are underweight, normal weight, overweight, or " obese.  This information is not intended to replace advice given to you by your health care provider. Make sure you discuss any questions you have with your health care provider.  Document Released: 08/29/2005 Document Revised: 10/31/2018 Document Reviewed: 10/31/2018  [employer] Interactive Patient Education © 2020 [employer]      For more information:    Quit Now [place]  1-800-QUIT-NOW  https://[facility].quitlogix.org/en-[place]/  Steps to Quit Smoking  Smoking tobacco can be harmful to your health and can affect almost every organ in your body. Smoking puts you, and those around you, at risk for developing many serious chronic diseases. Quitting smoking is difficult, but it is one of the best things that you can do for your health. It is never too late to quit.  What are the benefits of quitting smoking?  When you quit smoking, you lower your risk of developing serious diseases and conditions, such as:  · Lung cancer or lung disease, such as COPD.  · Heart disease.  · Stroke.  · Heart attack.  · Infertility.  · Osteoporosis and bone fractures.  Additionally, symptoms such as coughing, wheezing, and shortness of breath may get better when you quit. You may also find that you get sick less often because your body is stronger at fighting off colds and infections. If you are pregnant, quitting smoking can help to reduce your chances of having a baby of low birth weight.  How do I get ready to quit?  When you decide to quit smoking, create a plan to make sure that you are successful. Before you quit:  · Pick a date to quit. Set a date within the next two weeks to give you time to prepare.  · Write down the reasons why you are quitting. Keep this list in places where you will see it often, such as on your bathroom mirror or in your car or wallet.  · Identify the people, places, things, and activities that make you want to smoke (triggers) and avoid them. Make sure to take these actions:  ¨ Throw away all cigarettes at  home, at work, and in your car.  ¨ Throw away smoking accessories, such as ashtrays and lighters.  ¨ Clean your car and make sure to empty the ashtray.  ¨ Clean your home, including curtains and carpets.  · Tell your family, friends, and coworkers that you are quitting. Support from your loved ones can make quitting easier.  · Talk with your health care provider about your options for quitting smoking.  · Find out what treatment options are covered by your health insurance.  What strategies can I use to quit smoking?  Talk with your healthcare provider about different strategies to quit smoking. Some strategies include:  · Quitting smoking altogether instead of gradually lessening how much you smoke over a period of time. Research shows that quitting “cold turkey” is more successful than gradually quitting.  · Attending in-person counseling to help you build problem-solving skills. You are more likely to have success in quitting if you attend several counseling sessions. Even short sessions of 10 minutes can be effective.  · Finding resources and support systems that can help you to quit smoking and remain smoke-free after you quit. These resources are most helpful when you use them often. They can include:  ¨ Online chats with a counselor.  ¨ Telephone quitlines.  ¨ Printed self-help materials.  ¨ Support groups or group counseling.  ¨ Text messaging programs.  ¨ Mobile phone applications.  · Taking medicines to help you quit smoking. (If you are pregnant or breastfeeding, talk with your health care provider first.) Some medicines contain nicotine and some do not. Both types of medicines help with cravings, but the medicines that include nicotine help to relieve withdrawal symptoms. Your health care provider may recommend:  ¨ Nicotine patches, gum, or lozenges.  ¨ Nicotine inhalers or sprays.  ¨ Non-nicotine medicine that is taken by mouth.  Talk with your health care provider about combining strategies, such as  taking medicines while you are also receiving in-person counseling. Using these two strategies together makes you more likely to succeed in quitting than if you used either strategy on its own.  If you are pregnant or breastfeeding, talk with your health care provider about finding counseling or other support strategies to quit smoking. Do not take medicine to help you quit smoking unless told to do so by your health care provider.  What things can I do to make it easier to quit?  Quitting smoking might feel overwhelming at first, but there is a lot that you can do to make it easier. Take these important actions:  · Reach out to your family and friends and ask that they support and encourage you during this time. Call telephone quitlines, reach out to support groups, or work with a counselor for support.  · Ask people who smoke to avoid smoking around you.  · Avoid places that trigger you to smoke, such as bars, parties, or smoke-break areas at work.  · Spend time around people who do not smoke.  · Lessen stress in your life, because stress can be a smoking trigger for some people. To lessen stress, try:  ¨ Exercising regularly.  ¨ Deep-breathing exercises.  ¨ Yoga.  ¨ Meditating.  ¨ Performing a body scan. This involves closing your eyes, scanning your body from head to toe, and noticing which parts of your body are particularly tense. Purposefully relax the muscles in those areas.  · Download or purchase mobile phone or tablet apps (applications) that can help you stick to your quit plan by providing reminders, tips, and encouragement. There are many free apps, such as QuitGuide from the CDC (Centers for Disease Control and Prevention). You can find other support for quitting smoking (smoking cessation) through smokefree.gov and other websites.  How will I feel when I quit smoking?  Within the first 24 hours of quitting smoking, you may start to feel some withdrawal symptoms. These symptoms are usually most  noticeable 2-3 days after quitting, but they usually do not last beyond 2-3 weeks. Changes or symptoms that you might experience include:  · Mood swings.  · Restlessness, anxiety, or irritation.  · Difficulty concentrating.  · Dizziness.  · Strong cravings for sugary foods in addition to nicotine.  · Mild weight gain.  · Constipation.  · Nausea.  · Coughing or a sore throat.  · Changes in how your medicines work in your body.  · A depressed mood.  · Difficulty sleeping (insomnia).  After the first 2-3 weeks of quitting, you may start to notice more positive results, such as:  · Improved sense of smell and taste.  · Decreased coughing and sore throat.  · Slower heart rate.  · Lower blood pressure.  · Clearer skin.  · The ability to breathe more easily.  · Fewer sick days.  Quitting smoking is very challenging for most people. Do not get discouraged if you are not successful the first time. Some people need to make many attempts to quit before they achieve long-term success. Do your best to stick to your quit plan, and talk with your health care provider if you have any questions or concerns.  This information is not intended to replace advice given to you by your health care provider. Make sure you discuss any questions you have with your health care provider.  Document Released: 12/12/2002 Document Revised: 08/15/2017 Document Reviewed: 05/03/2016  [employer] Interactive Patient Education © 2017 [employer]

## 2020-04-14 NOTE — PROGRESS NOTES
"    Chief complaint:   Chief Complaint   Patient presents with   • Follow-up     Here to discuss test results. F/U neck pain.   • Arm Pain   • Numbness        Subjective     HPI: This is a 65-year-old male gentleman who we have been following for neck pain and left upper extremity numbness and tingling.  Patient states that his neck pain seems to be under control at this time and it is not his biggest complaint.  By far his biggest complaint is numbness and testing that he is experiencing in his left upper extremity that starts above his left elbow and goes all the way down his hand to the last 2 digits.  He says this is constant and can cause him pain.  He is also stating that he has noticed that it is becoming prominent on the right upper extremity as well.  Denies any pain from his neck radiating into his arm.  Denies any bowel or bladder incontinence.  Rates his pain on a scale of 0-10 at a 6.  He says it does interfere with his actives of daily living.    Review of Systems   Musculoskeletal: Positive for neck pain.   Neurological: Positive for numbness.         Objective      Vital Signs  Ht 172.7 cm (68\")   Wt 133 kg (293 lb)   BMI 44.55 kg/m²     Physical Exam   Constitutional: He is oriented to person, place, and time. He appears well-developed and well-nourished.   HENT:   Head: Normocephalic.   Eyes: Pupils are equal, round, and reactive to light. EOM are normal.   Neck: Normal range of motion.   Pulmonary/Chest: Effort normal.   Musculoskeletal: Normal range of motion.        Cervical back: He exhibits pain.   Neurological: He is alert and oriented to person, place, and time. He has normal reflexes. No cranial nerve deficit or sensory deficit. Gait normal. GCS eye subscore is 4. GCS verbal subscore is 5. GCS motor subscore is 6.   Skin: Skin is warm.   Psychiatric: He has a normal mood and affect. His speech is normal and behavior is normal. Thought content normal.       Neurologic Exam     Mental " Status   Oriented to person, place, and time.   Attention: normal. Concentration: normal.   Speech: speech is normal   Level of consciousness: alert  Normal comprehension.     Cranial Nerves   Cranial nerves II through XII intact.     CN III, IV, VI   Pupils are equal, round, and reactive to light.  Extraocular motions are normal.     Motor Exam   Muscle bulk: normal    Sensory Exam   Light touch normal.       Results Review: EMG/NCS of the left upper extremity that was done on February 25, 2020 shows a severe left ulnar nerve compromise.  There is also mild medial nerve compromise.  No cervical radiculopathy noted        Assessment/Plan: Patient has symptoms and test results consistent with cubital tunnel syndrome on the left upper extremity.  The patient would like to have this taken care of from a surgical standpoint however due to COVID-19 at this time we are not doing any nonemergency surgeries.  The patient acknowledged understanding of this.  He would like to have this addressed soon as possible.  I will make note of this and when everything is over from the COVID-19 we will get the patient back here in the office to be set up to have surgical intervention done.  He is also complaining of some symptoms on the right side and would like a nerve conduction study done on the right as well.  We will order this testing after regulations have been changed from the COVID-19.    Patient is a smoker.  Smoking cessation classes given to the patient  BMI shows the patient is Extremely Obese. BMI chart was given to the patient.     This video was conducted using audio/video technology    Manuel was seen today for follow-up, arm pain and numbness.    Diagnoses and all orders for this visit:    Cervical spinal stenosis    Numbness and tingling of left upper extremity    Cubital tunnel syndrome of both upper extremities    BMI 40.0-44.9, adult (CMS/HCC)    Smoker        I discussed the patients findings and my recommendations  with patient  Jj Barlow, APRN  04/14/20  11:31

## 2020-05-03 ENCOUNTER — APPOINTMENT (OUTPATIENT)
Dept: CT IMAGING | Facility: HOSPITAL | Age: 65
End: 2020-05-03

## 2020-05-03 ENCOUNTER — HOSPITAL ENCOUNTER (EMERGENCY)
Facility: HOSPITAL | Age: 65
Discharge: HOME | End: 2020-05-03
Admitting: EMERGENCY MEDICINE

## 2020-05-03 ENCOUNTER — APPOINTMENT (OUTPATIENT)
Dept: GENERAL RADIOLOGY | Facility: HOSPITAL | Age: 65
End: 2020-05-03

## 2020-05-03 VITALS
HEIGHT: 68 IN | OXYGEN SATURATION: 94 % | WEIGHT: 285 LBS | RESPIRATION RATE: 19 BRPM | SYSTOLIC BLOOD PRESSURE: 160 MMHG | HEART RATE: 73 BPM | TEMPERATURE: 97.7 F | DIASTOLIC BLOOD PRESSURE: 80 MMHG | BODY MASS INDEX: 43.19 KG/M2

## 2020-05-03 DIAGNOSIS — M51.26 LUMBAR DISC HERNIATION: ICD-10-CM

## 2020-05-03 DIAGNOSIS — L03.115 CELLULITIS OF RIGHT FOOT: Primary | ICD-10-CM

## 2020-05-03 LAB
ALBUMIN SERPL-MCNC: 4.1 G/DL (ref 3.5–5.2)
ALBUMIN/GLOB SERPL: 1.1 G/DL
ALP SERPL-CCNC: 23 U/L (ref 39–117)
ALT SERPL W P-5'-P-CCNC: 30 U/L (ref 1–41)
ANION GAP SERPL CALCULATED.3IONS-SCNC: 13 MMOL/L (ref 5–15)
AST SERPL-CCNC: 23 U/L (ref 1–40)
BASOPHILS # BLD AUTO: 0.05 10*3/MM3 (ref 0–0.2)
BASOPHILS NFR BLD AUTO: 0.6 % (ref 0–1.5)
BILIRUB SERPL-MCNC: 0.2 MG/DL (ref 0.2–1.2)
BILIRUB UR QL STRIP: NEGATIVE
BUN BLD-MCNC: 22 MG/DL (ref 8–23)
BUN/CREAT SERPL: 21.6 (ref 7–25)
CALCIUM SPEC-SCNC: 10.2 MG/DL (ref 8.6–10.5)
CHLORIDE SERPL-SCNC: 104 MMOL/L (ref 98–107)
CLARITY UR: CLEAR
CO2 SERPL-SCNC: 25 MMOL/L (ref 22–29)
COLOR UR: YELLOW
CREAT BLD-MCNC: 1.02 MG/DL (ref 0.76–1.27)
D-LACTATE SERPL-SCNC: 1.5 MMOL/L (ref 0.5–2)
DEPRECATED RDW RBC AUTO: 51.1 FL (ref 37–54)
EOSINOPHIL # BLD AUTO: 0.15 10*3/MM3 (ref 0–0.4)
EOSINOPHIL NFR BLD AUTO: 1.8 % (ref 0.3–6.2)
ERYTHROCYTE [DISTWIDTH] IN BLOOD BY AUTOMATED COUNT: 16.6 % (ref 12.3–15.4)
GFR SERPL CREATININE-BSD FRML MDRD: 74 ML/MIN/1.73
GLOBULIN UR ELPH-MCNC: 3.8 GM/DL
GLUCOSE BLD-MCNC: 108 MG/DL (ref 65–99)
GLUCOSE UR STRIP-MCNC: ABNORMAL MG/DL
HCT VFR BLD AUTO: 38 % (ref 37.5–51)
HGB BLD-MCNC: 11.9 G/DL (ref 13–17.7)
HGB UR QL STRIP.AUTO: NEGATIVE
HOLD SPECIMEN: NORMAL
IMM GRANULOCYTES # BLD AUTO: 0.06 10*3/MM3 (ref 0–0.05)
IMM GRANULOCYTES NFR BLD AUTO: 0.7 % (ref 0–0.5)
KETONES UR QL STRIP: NEGATIVE
LEUKOCYTE ESTERASE UR QL STRIP.AUTO: NEGATIVE
LYMPHOCYTES # BLD AUTO: 1.26 10*3/MM3 (ref 0.7–3.1)
LYMPHOCYTES NFR BLD AUTO: 15.2 % (ref 19.6–45.3)
MCH RBC QN AUTO: 26.6 PG (ref 26.6–33)
MCHC RBC AUTO-ENTMCNC: 31.3 G/DL (ref 31.5–35.7)
MCV RBC AUTO: 85 FL (ref 79–97)
MONOCYTES # BLD AUTO: 1.02 10*3/MM3 (ref 0.1–0.9)
MONOCYTES NFR BLD AUTO: 12.3 % (ref 5–12)
NEUTROPHILS # BLD AUTO: 5.76 10*3/MM3 (ref 1.7–7)
NEUTROPHILS NFR BLD AUTO: 69.4 % (ref 42.7–76)
NITRITE UR QL STRIP: NEGATIVE
NRBC BLD AUTO-RTO: 0 /100 WBC (ref 0–0.2)
NT-PROBNP SERPL-MCNC: 13.8 PG/ML (ref 5–900)
PH UR STRIP.AUTO: <=5 [PH] (ref 5–8)
PLATELET # BLD AUTO: 362 10*3/MM3 (ref 140–450)
PMV BLD AUTO: 10.5 FL (ref 6–12)
POTASSIUM BLD-SCNC: 5 MMOL/L (ref 3.5–5.2)
PROCALCITONIN SERPL-MCNC: 0.07 NG/ML (ref 0.1–0.25)
PROT SERPL-MCNC: 7.9 G/DL (ref 6–8.5)
PROT UR QL STRIP: NEGATIVE
RBC # BLD AUTO: 4.47 10*6/MM3 (ref 4.14–5.8)
SODIUM BLD-SCNC: 142 MMOL/L (ref 136–145)
SP GR UR STRIP: >1.03 (ref 1–1.03)
UROBILINOGEN UR QL STRIP: ABNORMAL
WBC NRBC COR # BLD: 8.3 10*3/MM3 (ref 3.4–10.8)
WHOLE BLOOD HOLD SPECIMEN: NORMAL
WHOLE BLOOD HOLD SPECIMEN: NORMAL

## 2020-05-03 PROCEDURE — 99284 EMERGENCY DEPT VISIT MOD MDM: CPT

## 2020-05-03 PROCEDURE — 25010000002 PIPERACILLIN SOD-TAZOBACTAM PER 1 G: Performed by: PHYSICIAN ASSISTANT

## 2020-05-03 PROCEDURE — 80053 COMPREHEN METABOLIC PANEL: CPT | Performed by: PHYSICIAN ASSISTANT

## 2020-05-03 PROCEDURE — 87070 CULTURE OTHR SPECIMN AEROBIC: CPT | Performed by: PHYSICIAN ASSISTANT

## 2020-05-03 PROCEDURE — 87077 CULTURE AEROBIC IDENTIFY: CPT | Performed by: PHYSICIAN ASSISTANT

## 2020-05-03 PROCEDURE — 96367 TX/PROPH/DG ADDL SEQ IV INF: CPT

## 2020-05-03 PROCEDURE — 83880 ASSAY OF NATRIURETIC PEPTIDE: CPT | Performed by: PHYSICIAN ASSISTANT

## 2020-05-03 PROCEDURE — 81003 URINALYSIS AUTO W/O SCOPE: CPT | Performed by: PHYSICIAN ASSISTANT

## 2020-05-03 PROCEDURE — 87186 SC STD MICRODIL/AGAR DIL: CPT | Performed by: PHYSICIAN ASSISTANT

## 2020-05-03 PROCEDURE — 25010000002 VANCOMYCIN 1 G RECONSTITUTED SOLUTION: Performed by: PHYSICIAN ASSISTANT

## 2020-05-03 PROCEDURE — 85025 COMPLETE CBC W/AUTO DIFF WBC: CPT | Performed by: PHYSICIAN ASSISTANT

## 2020-05-03 PROCEDURE — 96365 THER/PROPH/DIAG IV INF INIT: CPT

## 2020-05-03 PROCEDURE — 96366 THER/PROPH/DIAG IV INF ADDON: CPT

## 2020-05-03 PROCEDURE — 73630 X-RAY EXAM OF FOOT: CPT

## 2020-05-03 PROCEDURE — 87040 BLOOD CULTURE FOR BACTERIA: CPT | Performed by: PHYSICIAN ASSISTANT

## 2020-05-03 PROCEDURE — 72131 CT LUMBAR SPINE W/O DYE: CPT

## 2020-05-03 PROCEDURE — 83605 ASSAY OF LACTIC ACID: CPT | Performed by: PHYSICIAN ASSISTANT

## 2020-05-03 PROCEDURE — 87205 SMEAR GRAM STAIN: CPT | Performed by: PHYSICIAN ASSISTANT

## 2020-05-03 PROCEDURE — 87147 CULTURE TYPE IMMUNOLOGIC: CPT | Performed by: PHYSICIAN ASSISTANT

## 2020-05-03 PROCEDURE — 84145 PROCALCITONIN (PCT): CPT | Performed by: PHYSICIAN ASSISTANT

## 2020-05-03 RX ORDER — SODIUM CHLORIDE 0.9 % (FLUSH) 0.9 %
10 SYRINGE (ML) INJECTION AS NEEDED
Status: DISCONTINUED | OUTPATIENT
Start: 2020-05-03 | End: 2020-05-03 | Stop reason: HOSPADM

## 2020-05-03 RX ORDER — CLINDAMYCIN HYDROCHLORIDE 300 MG/1
300 CAPSULE ORAL 4 TIMES DAILY
Qty: 40 CAPSULE | Refills: 0 | Status: SHIPPED | OUTPATIENT
Start: 2020-05-03 | End: 2020-05-13

## 2020-05-03 RX ORDER — GABAPENTIN 100 MG/1
300 CAPSULE ORAL ONCE
Status: COMPLETED | OUTPATIENT
Start: 2020-05-03 | End: 2020-05-03

## 2020-05-03 RX ORDER — CIPROFLOXACIN 500 MG/1
500 TABLET, FILM COATED ORAL 2 TIMES DAILY
Qty: 20 TABLET | Refills: 0 | Status: SHIPPED | OUTPATIENT
Start: 2020-05-03 | End: 2020-05-12

## 2020-05-03 RX ADMIN — GABAPENTIN 300 MG: 100 CAPSULE ORAL at 17:23

## 2020-05-03 RX ADMIN — PIPERACILLIN AND TAZOBACTAM 3.38 G: 3; .375 INJECTION, POWDER, FOR SOLUTION INTRAVENOUS at 17:24

## 2020-05-03 RX ADMIN — SODIUM CHLORIDE, POTASSIUM CHLORIDE, SODIUM LACTATE AND CALCIUM CHLORIDE 1000 ML: 600; 310; 30; 20 INJECTION, SOLUTION INTRAVENOUS at 17:57

## 2020-05-03 RX ADMIN — VANCOMYCIN HYDROCHLORIDE 2000 MG: 1 INJECTION, POWDER, LYOPHILIZED, FOR SOLUTION INTRAVENOUS at 18:16

## 2020-05-03 NOTE — ED PROVIDER NOTES
"Subjective   History of Present Illness    Patient is a 65-year-old male presenting to ED with numerous complaints.  Patient noted he has a history of diabetes with diabetic neuropathy and chronically has wounds.  Patient reported he has a wound to his left ankle which has been very well healing.  Patient reported a few days ago he noticed a wound to the bottom of his right great toe which he has had difficulty controlling.  Patient stated he cannot ascertain if there is any pain or sensation changes due to the extensive diabetic neuropathy or it he has.  Patient reported this morning when he went to change the wound dressing he did note that from the right midfoot distally he had erythema and he wanted to get this checked before \"it got out of control.\"  Patient reported that he is also been following up with Dr. Flowers concerning problems in his cervical spine as well as need for a cubital tunnel release in his left upper extremity.  Patient reported that he has weakness in these areas which has not changed however he feels like his right lower extremity is developing a similar type of weakness which made him concerned.  Patient reported that these outpatient surgical procedures have been pushed back due to COVID-19.  Patient stated that he is also developing a pain in his mid lower back which feels like a sciatic type nature pain and is gotten significantly worse over the past week.  Patient reported that he currently cares for his 10-year-old grandchild as the primary guardian but denies lifting the child or doing significant physical motions.  Patient reported no other known injuries including falls, twist, lifting injuries, MVAs, or blunt trauma.  Patient denies any incontinence of his bowel or bladder, fevers, saddle anesthesia, or any history of injections into his spine including epidurals, lumbar punctures, or pain/steroid medication injections.  Patient reported he has been using his home medications with " "no relief and denies any new medications.  Patient reported that his diabetes is insulin-dependent and his sugars have been \"just like usual\" over the past week however he is unable to tell me what those numbers are.    Patient reported he is a current 1 pack/day cigarette smoker but denies use of any alcohol, marijuana, or any other IV/recreational/illicit drugs.  Patient reported known medication allergies to sulfa antibiotics and iodine as well as fish products.  Patient reported a surgical history positive for bilateral carpal tunnel release, a right knee replacement, 5 cardiac stents.  Patient noted he also has a medical history significant for hypertension and arthritis.    Records reviewed show patient has been following with neurosurgery for cervical spinal stenosis, numbness and tingling of left upper extremity, cubital tunnel syndrome to bilateral upper extremities, as well as known tobacco smoker.  Patient was made aware that as his surgical interventions are nonemergent they will be scheduled once COVID-19 has resolved.  Patient's only previous imaging is an MRI and x-ray of his cervical spine with no further imaging of his lumbar/thoracic region or any other [facility] images.    Review of Systems   Constitutional: Negative for chills, diaphoresis and fever.   HENT: Negative.    Respiratory: Negative.  Negative for cough, chest tightness and shortness of breath.    Cardiovascular: Negative.  Negative for chest pain.   Gastrointestinal: Negative.  Negative for abdominal pain, nausea and vomiting.   Genitourinary: Negative.  Negative for dysuria, flank pain and hematuria.        Denies incontinence of bowel or bladder   Musculoskeletal: Negative.  Negative for arthralgias and myalgias.   Skin: Positive for color change (Erythema distal half of right foot) and wound (Plantar aspect right great toe). Negative for rash.   Neurological: Positive for weakness (Left upper extremity, left lower extremity). Negative for " dizziness, syncope and headaches.        Denies saddle anesthesia   All other systems reviewed and are negative.      Past Medical History:   Diagnosis Date   • Arthritis    • Diabetes mellitus (CMS/HCC)    • Diabetic neuropathy (CMS/HCC)    • Hypertension    • Nerve damage     L forearm/hand secondary to compound fx       Allergies   Allergen Reactions   • Iodine Unknown - High Severity   • Shellfish-Derived Products Anaphylaxis   • Sulfa Antibiotics Hives       Past Surgical History:   Procedure Laterality Date   • CARDIAC SURGERY      5 stents   • CARPAL TUNNEL RELEASE     • KNEE SURGERY Right    • OTHER SURGICAL HISTORY      compound fracture of the L arm       Family History   Problem Relation Age of Onset   • Stroke Mother    • Cancer Mother         breast cancer x2   • Diabetes Mother    • Heart disease Mother    • Hypertension Mother    • Heart disease Father    • Arthritis Father    • Hypertension Father        Social History     Socioeconomic History   • Marital status:      Spouse name: Not on file   • Number of children: Not on file   • Years of education: Not on file   • Highest education level: Not on file   Tobacco Use   • Smoking status: Current Every Day Smoker     Packs/day: 0.50   • Smokeless tobacco: Never Used   Substance and Sexual Activity   • Alcohol use: Yes   • Drug use: No   • Sexual activity: Defer           Objective   Physical Exam   Constitutional: He is oriented to person, place, and time. He appears well-developed and well-nourished. He is cooperative. No distress.   HENT:   Head: Normocephalic and atraumatic.   Right Ear: External ear normal.   Left Ear: External ear normal.   Mouth/Throat: Uvula is midline, oropharynx is clear and moist and mucous membranes are normal. No oropharyngeal exudate, posterior oropharyngeal edema or posterior oropharyngeal erythema.   Eyes: Pupils are equal, round, and reactive to light. Conjunctivae, EOM and lids are normal. Right conjunctiva is  not injected. Left conjunctiva is not injected. Right eye exhibits no nystagmus. Left eye exhibits no nystagmus.   Neck: Normal range of motion. Neck supple.   Cardiovascular: Normal rate, regular rhythm, normal heart sounds, intact distal pulses and normal pulses.   No murmur heard.  Pulses:       Radial pulses are 2+ on the right side, and 2+ on the left side.        Dorsalis pedis pulses are 2+ on the right side, and 2+ on the left side.        Posterior tibial pulses are 2+ on the right side, and 2+ on the left side.   Pulmonary/Chest: Effort normal and breath sounds normal. No respiratory distress. He has no decreased breath sounds. He has no wheezes. He has no rhonchi. He has no rales. He exhibits no bony tenderness.   Abdominal: Soft. Normal appearance and bowel sounds are normal. There is no tenderness. There is no guarding and no CVA tenderness.   Obese abdomen.  Inspection of region under pannus shows no evidence of skin breakdown or skin infection.   Musculoskeletal:        Left wrist: He exhibits normal range of motion.        Cervical back: Normal.        Thoracic back: He exhibits bony tenderness and spasm (Mild bilateral paraspinal muscular spasm).        Lumbar back: Normal. He exhibits no spasm.   2 wounds noted to the plantar aspect of the right great toe which are approximately 0.25 cm in diameter.  No evidence of bone visualized.  No active bleeding.  No wound discharge.  Erythema noted surrounding these wounds and extending proximally to the midfoot, involving the remainder of the right toes.  Hammertoe deformity noted to bilateral toes.  No edema noted to the right foot.  Evidence of chronic venous stasis staining.  No wounds or skin defects noted to the remainder of the right foot, right ankle, or right lower extremity.    Left upper extremity with painful range of motion of wrist and no other abnormalities including no bony tenderness, no joint swelling, no skin defects, no erythema, and no  signs of infection.    5/5 symmetric strength of bilateral lower extremities.  5/5 metric strength of bilateral upper extremities.        Neurological: He is alert and oriented to person, place, and time. He has normal strength. A sensory deficit (Per patient at his baseline diabetic neuropathy) is present. GCS eye subscore is 4. GCS verbal subscore is 5. GCS motor subscore is 6.   Skin: Skin is warm and dry. Capillary refill takes less than 2 seconds. No rash noted. He is not diaphoretic.   Psychiatric: He has a normal mood and affect. His speech is normal and behavior is normal. Thought content normal.   Nursing note and vitals reviewed.      Procedures           ED Course  ED Course as of May 04 1611   Sun May 03, 2020   1738 Procalcitonin decreased to 0.07.   Urinalysis with >1000 glucose, no hematuria, and no evidence of infection.   No leukocytosis/leukopenia.  ANC WNL.  Lactic 1.5.     CT shows: No acute osseous pathology.  Alignment as above. Multilevel degenerative changes with special mention of the L3-L4 level where there is a suspected disc herniation. Further characterization with outpatient MRI lumbar spine without contrast may be performed.  XR shows: No evidence of acute osteomyelitis    [JS]   1756 Upon reevaluation at this time patient reporting that he feels much better.  Reviewed with patient lab and imaging findings and need at this time for treatment of cellulitis.  Discussed with patient spinal findings and need to bring this up at his next appointment with Dr. Flowers.  Discussed with patient very strict return precautions need for immediate return to ED should he develop any new or worsening symptoms.  Discussed need to contact primary care provider to make aware of the new wounds, new spinal findings, and need to establish care with a podiatrist.  Discussed need to contact PCP within the next 24 to 48 hours.  Discussed with patient that upon completion of IV antibiotics he will be stable for  discharge.  Patient is amenable to continued treatment plan at this time, acknowledges need for further outpatient work-up including outpatient MRI, and has no further questions, concerns, or needs at this time.    [JS]   1818 Patient is aggravated at this time reporting he needs to drive an hour and a half home to  his grandson from the neighbors need to work tomorrow.  Explained to patient importance once again of receiving IV antibiotics and apologize for the time it takes describing that antibiotics must be administered over a controlled period of time.  Patient voiced understanding and acknowledgment and is willing to continue to receive antibiotics and is aware that he is stable for discharge upon completion.  Patient is offered food at this time as he is reporting he is hungry.    [JS]      ED Course User Index  [JS] Jed Torres PA-C                                           MDM  Number of Diagnoses or Management Options  Cellulitis of right foot:   Lumbar disc herniation:   Diagnosis management comments: 65-year-old male with history of type 2 diabetes presents with right great toe plantar wound.  Imaging shows no evidence of osteomyelitis.  Lab work shows normal lactic, pro calcitonin 0.07, no evidence of leukocytosis.  Based on patient's previous history of MRSA infections will provide antibiotic coverage with activity and streptococci, MRSA, aerobic gram-negative bacilli, and anaerobes with Bactrim and Augmentin however patient is noted to be allergic to sulfa drugs so the antibiotics will be changed to clindamycin and Cipro for a similar pathogen coverage..  In regards to concern for L3-L4 disc herniation patient will be scheduled for outpatient MRI without contrast with subsequent follow-up with Dr. Flowers who he is already an established patient with.       Amount and/or Complexity of Data Reviewed  Clinical lab tests: ordered and reviewed  Tests in the radiology section of CPT®: ordered  and reviewed  Decide to obtain previous medical records or to obtain history from someone other than the patient: yes  Review and summarize past medical records: yes    Patient Progress  Patient progress: improved      Final diagnoses:   Cellulitis of right foot   Lumbar disc herniation            Jed Torres PA-C  05/04/20 6005

## 2020-05-04 NOTE — DISCHARGE INSTRUCTIONS
As we discussed it is very important that you continue to monitor the wound on the bottom of your foot.  Please change her dressing at least once a day so you can make sure that the wound is healing appropriately.  Please contact your primary care provider in the morning to establish a follow-up appointment within the next 48 hours to have long-term management as the wound heals.  Please contact Dr. Flowers's office to schedule an appointment once you have completed your outpatient MRI for further evaluation of your L3-L4 herniated disc.  Please read below for further information regarding cellulitis, the infection noted to your right foot, as well as herniated disks.    Cellulitis, Adult    Cellulitis is a skin infection. The infected area is usually warm, red, swollen, and tender. This condition occurs most often in the arms and lower legs. The infection can travel to the muscles, blood, and underlying tissue and become serious. It is very important to get treated for this condition.  What are the causes?  Cellulitis is caused by bacteria. The bacteria enter through a break in the skin, such as a cut, burn, insect bite, open sore, or crack.  What increases the risk?  This condition is more likely to occur in people who:  · Have a weak body defense system (immune system).  · Have open wounds on the skin, such as cuts, burns, bites, and scrapes. Bacteria can enter the body through these open wounds.  · Are older than 60 years of age.  · Have diabetes.  · Have a type of long-lasting (chronic) liver disease (cirrhosis) or kidney disease.  · Are obese.  · Have a skin condition such as:  ? Itchy rash (eczema).  ? Slow movement of blood in the veins (venous stasis).  ? Fluid buildup below the skin (edema).  · Have had radiation therapy.  · Use IV drugs.  What are the signs or symptoms?  Symptoms of this condition include:  · Redness, streaking, or spotting on the skin.  · Swollen area of the skin.  · Tenderness or pain  when an area of the skin is touched.  · Warm skin.  · A fever.  · Chills.  · Blisters.  How is this diagnosed?  This condition is diagnosed based on a medical history and physical exam. You may also have tests, including:  · Blood tests.  · Imaging tests.  How is this treated?  Treatment for this condition may include:  · Medicines, such as antibiotic medicines or medicines to treat allergies (antihistamines).  · Supportive care, such as rest and application of cold or warm cloths (compresses) to the skin.  · Hospital care, if the condition is severe.  The infection usually starts to get better within 1-2 days of treatment.  Follow these instructions at home:    Medicines  · Take over-the-counter and prescription medicines only as told by your health care provider.  · If you were prescribed an antibiotic medicine, take it as told by your health care provider. Do not stop taking the antibiotic even if you start to feel better.  General instructions  · Drink enough fluid to keep your urine pale yellow.  · Do not touch or rub the infected area.  · Raise (elevate) the infected area above the level of your heart while you are sitting or lying down.  · Apply warm or cold compresses to the affected area as told by your health care provider.  · Keep all follow-up visits as told by your health care provider. This is important. These visits let your health care provider make sure a more serious infection is not developing.  Contact a health care provider if:  · You have a fever.  · Your symptoms do not begin to improve within 1-2 days of starting treatment.  · Your bone or joint underneath the infected area becomes painful after the skin has healed.  · Your infection returns in the same area or another area.  · You notice a swollen bump in the infected area.  · You develop new symptoms.  · You have a general ill feeling (malaise) with muscle aches and pains.  Get help right away if:  · Your symptoms get worse.  · You feel  very sleepy.  · You develop vomiting or diarrhea that persists.  · You notice red streaks coming from the infected area.  · Your red area gets larger or turns dark in color.  These symptoms may represent a serious problem that is an emergency. Do not wait to see if the symptoms will go away. Get medical help right away. Call your local emergency services (911 in the [place] Do not drive yourself to the hospital.  Summary  · Cellulitis is a skin infection. This condition occurs most often in the arms and lower legs.  · Treatment for this condition may include medicines, such as antibiotic medicines or antihistamines.  · Take over-the-counter and prescription medicines only as told by your health care provider. If you were prescribed an antibiotic medicine, do not stop taking the antibiotic even if you start to feel better.  · Contact a health care provider if your symptoms do not begin to improve within 1-2 days of starting treatment or your symptoms get worse.  · Keep all follow-up visits as told by your health care provider. This is important. These visits let your health care provider make sure that a more serious infection is not developing.  This information is not intended to replace advice given to you by your health care provider. Make sure you discuss any questions you have with your health care provider.  Document Released: 09/27/2006 Document Revised: 05/09/2019 Document Reviewed: 05/09/2019  [employer] Interactive Patient Education © 2020 [employer]      Herniated Disk    A herniated disk, also called a ruptured disk or slipped disk, occurs when a disk in the spine bulges out too far. Between the bones in the spine (vertebrae), there are oval disks that are made of a soft, spongy center that is surrounded by a tough outer ring. The disks connect your vertebrae, help your spine move, and absorb shocks from your movement.  When you have a herniated disk, the spongy center of the disk bulges out or breaks  through the outer ring. It can press on a nerve between the vertebrae and cause pain. This can occur anywhere in the back or neck area, but the lower back is most commonly affected.  What are the causes?  This condition may be caused by:  · Age-related wear and tear. The spongy centers of spinal disks tend to shrink and dry out with age, which makes them more likely to herniate.  · Sudden injury, such as a strain or sprain.  What increases the risk?  Aging is the main risk factor for a herniated disk. Other risk factors include:  · Being a man who is 30-50 years old.  · Frequently doing activities that involve heavy lifting, bending, or twisting.  · Frequently driving for long hours at a time.  · Not getting enough exercise.  · Being overweight.  · Smoking.  · Having a family history of back problems or herniated disks.  · Being pregnant or giving birth.  · Having poor nutrition.  · Being tall.  What are the signs or symptoms?  Symptoms may vary depending on where your herniated disk is located.  · A herniated disk in the lower back may cause sharp pain in:  ? Part of the arm, leg, hip, or buttocks.  ? The back of the lower leg (calf).  ? The lower back, spreading down through the leg into the foot (sciatica).  · A herniated disk in the neck may cause dizziness and vertigo. It may also cause pain or weakness in:  ? The neck.  ? The shoulder blades.  ? Upper arm, forearm, or fingers.  · You may also have muscle weakness. It may be difficult to:  ? Lift your leg or arm.  ? Stand on your toes.  ? Squeeze tightly with one of your hands.  · Other symptoms may include:  ? Numbness or tingling in the affected areas of the hands, arms, feet, or legs.  ? Inability to control when you urinate or when you have bowel movements. This is a rare but serious sign of a severe herniated disk in the lower back.  How is this diagnosed?  This condition may be diagnosed based on:  · Your symptoms.  · Your medical history.  · A physical  exam. The exam may include:  ? Straight-leg test. You will lie on your back while your health care provider lifts your leg, keeping your knee straight. If you feel pain, you likely have a herniated disk.  ? Neurological tests. This includes checking for numbness, reflexes, muscle strength, and posture.  · Imaging tests, such as:  ? X-rays.  ? MRI.  ? CT scan.  ? Electromyogram (EMG) to check the nerves that control muscles. This test may be used to determine which nerves are affected by your herniated disk.  How is this treated?  Treatment for this condition may include:  · A short period of rest. This is usually the first treatment.  ? You may be on bed rest for up to 2 days, or you may be instructed to stay home and avoid physical activity.  ? If you have a herniated disk in your lower back, avoid sitting as much as possible. Sitting increases pressure on the disk.  · Medicines. These may include:  ? NSAIDs to help reduce pain and swelling.  ? Muscle relaxants to prevent sudden tightening of the back muscles (back spasms).  ? Prescription pain medicines, if you have severe pain.  · Steroid injections in the area of the herniated disk. This can help reduce pain and swelling.  · Physical therapy to strengthen your back muscles.  In many cases, symptoms go away with treatment over a period of days or weeks. You will most likely be free of symptoms after 3-4 months. If other treatments do not help to relieve your symptoms, you may need surgery.  Follow these instructions at home:  Medicines  · Take over-the-counter and prescription medicines only as told by your health care provider.  · Do not drive or use heavy machinery while taking prescription pain medicine.  Activity  · Rest as directed.  · After your rest period:  ? Return to your normal activities and gradually begin exercising as told by your health care provider. Ask your health care provider what activities and exercises are safe for you.  ? Use good  posture.  ? Avoid movements that cause pain.  ? Do not lift anything that is heavier than 10 lb (4.5 kg) until your health care provider says this is safe.  ? Do not sit or stand for long periods of time without changing positions.  ? Do not sit for long periods of time without getting up and moving around.  · If physical therapy was prescribed, do exercises as instructed.  · Aim to strengthen muscles in your back and abdomen with exercises like crunches, swimming, or walking.  General instructions  · Do not use any products that contain nicotine or tobacco, such as cigarettes and e-cigarettes. These products can delay healing. If you need help quitting, ask your health care provider.  · Do not wear high-heeled shoes.  · Do not sleep on your belly.  · If you are overweight, work with your health care provider to lose weight safely.  · To prevent or treat constipation while you are taking prescription pain medicine, your health care provider may recommend that you:  ? Drink enough fluid to keep your urine clear or pale yellow.  ? Take over-the-counter or prescription medicines.  ? Eat foods that are high in fiber, such as fresh fruits and vegetables, whole grains, and beans.  ? Limit foods that are high in fat and processed sugars, such as fried and sweet foods.  · Keep all follow-up visits as told by your health care provider. This is important.  How is this prevented?    · Maintain a healthy weight.  · Try to avoid stressful situations.  · Maintain physical fitness. Do at least 150 minutes of moderate-intensity exercise each week, such as brisk walking or water aerobics.  · When lifting objects:  ? Keep your feet at least shoulder-width apart and tighten your abdominal muscles.  ? Keep your spine neutral as you bend your knees and hips. It is important to lift using the strength of your legs, not your back. Do not lock your knees straight out.  ? Always ask for help to lift heavy or awkward objects.  Contact a  health care provider if:  · You have back pain or neck pain that does not get better after 6 weeks.  · You have severe pain in your back, neck, legs, or arms.  · You develop numbness, tingling, or weakness in any part of your body.  Get help right away if:  · You cannot move your arms or legs.  · You cannot control when you urinate or have bowel movements.  · You feel dizzy or you faint.  · You have shortness of breath.  This information is not intended to replace advice given to you by your health care provider. Make sure you discuss any questions you have with your health care provider.  Document Released: 12/15/2001 Document Revised: 08/14/2017 Document Reviewed: 08/14/2017  [employer] Interactive Patient Education © 2020 [employer]

## 2020-05-04 NOTE — ED NOTES
"PT OVERHEARD BY NURSING STAFF FROM ANOTHER PATIENTS ROOM YELLING AND CURSING AT ER TECH FROM THE HALLWAY.  UPON PRESENTING TO THE PATIENT AND UNDERSTANDING HIS FRUSTRATION HE STATES \"IM OUT OF PATIENCE, I WAS TOLD IM GETTING DISCHARGED AND I DONT NEED THE REST OF THAT MEDICINE.  I CALLED OUT ON THE CALL LIGHT TO GO TO THE BATHROOM AND HAD TO UNHOOK MYSELF\".  THE PATIENT WAS EXPLAINED IMPORTANCE OF ANTIBIOTIC COMPLETION AND RISKS/BENEFITS OF NOT RECEIVING THE MEDICINES VS STAYING UNTIL THE COMPLETION.  THE PATIENT CONTINUED TO VERBALLY ABUSE STAFF AND WAVE HIS CANE AROUND THE ROOM.  THE PATIENT CONSENTED TO SIGNING DISCHARGE PAPERWORK AND RECEIVED HIS DC ANTIBIOTICS/PAPERS.  PT AMBULATORY FROM THE ER.      Cuauhtemoc Zavala, RN  05/03/20 2050       Cuauhtemoc Zavala RN  05/03/20 2050    "

## 2020-05-06 ENCOUNTER — TELEPHONE (OUTPATIENT)
Dept: EMERGENCY DEPT | Facility: HOSPITAL | Age: 65
End: 2020-05-06

## 2020-05-06 LAB
BACTERIA SPEC AEROBE CULT: ABNORMAL
BACTERIA SPEC AEROBE CULT: ABNORMAL
GRAM STN SPEC: ABNORMAL

## 2020-05-08 LAB
BACTERIA SPEC AEROBE CULT: NORMAL
BACTERIA SPEC AEROBE CULT: NORMAL

## 2020-05-12 ENCOUNTER — OFFICE VISIT (OUTPATIENT)
Dept: NEUROSURGERY | Facility: CLINIC | Age: 65
End: 2020-05-12

## 2020-05-12 VITALS
BODY MASS INDEX: 43.95 KG/M2 | HEIGHT: 68 IN | WEIGHT: 290 LBS | DIASTOLIC BLOOD PRESSURE: 100 MMHG | SYSTOLIC BLOOD PRESSURE: 142 MMHG

## 2020-05-12 DIAGNOSIS — F17.200 SMOKER: ICD-10-CM

## 2020-05-12 DIAGNOSIS — M54.50 CHRONIC BILATERAL LOW BACK PAIN WITHOUT SCIATICA: ICD-10-CM

## 2020-05-12 DIAGNOSIS — R53.1 LEFT-SIDED WEAKNESS: Primary | ICD-10-CM

## 2020-05-12 DIAGNOSIS — G89.29 CHRONIC BILATERAL LOW BACK PAIN WITHOUT SCIATICA: ICD-10-CM

## 2020-05-12 DIAGNOSIS — G56.22 CUBITAL TUNNEL SYNDROME ON LEFT: ICD-10-CM

## 2020-05-12 PROCEDURE — 99214 OFFICE O/P EST MOD 30 MIN: CPT | Performed by: NURSE PRACTITIONER

## 2020-05-12 RX ORDER — CLOTRIMAZOLE AND BETAMETHASONE DIPROPIONATE 10; .64 MG/G; MG/G
CREAM TOPICAL
Status: ON HOLD | COMMUNITY
Start: 2020-05-04 | End: 2020-10-09

## 2020-05-12 RX ORDER — PENTOXIFYLLINE 400 MG/1
400 TABLET, EXTENDED RELEASE ORAL 3 TIMES DAILY
Status: ON HOLD | COMMUNITY
Start: 2020-04-19 | End: 2020-10-12

## 2020-05-12 RX ORDER — CEFDINIR 300 MG/1
CAPSULE ORAL
Status: ON HOLD | COMMUNITY
Start: 2020-05-07 | End: 2020-10-12

## 2020-05-12 RX ORDER — BECAPLERMIN 0.01 %
GEL (GRAM) TOPICAL
Status: ON HOLD | COMMUNITY
Start: 2020-04-09 | End: 2020-10-12

## 2020-05-12 NOTE — PATIENT INSTRUCTIONS
"BMI for Adults    Body mass index (BMI) is a number that is calculated from a person's weight and height. BMI may help to estimate how much of a person's weight is composed of fat. BMI can help identify those who may be at higher risk for certain medical problems.  How is BMI used with adults?  BMI is used as a screening tool to identify possible weight problems. It is used to check whether a person is obese, overweight, healthy weight, or underweight.  How is BMI calculated?  BMI measures your weight and compares it to your height. This can be done either in English ([place] or metric measurements. Note that charts are available to help you find your BMI quickly and easily without having to do these calculations yourself.  To calculate your BMI in English ([place] measurements, your health care provider will:  1. Measure your weight in pounds (lb).  2. Multiply the number of pounds by 703.  ? For example, for a person who weighs 180 lb, multiply that number by 703, which equals 126,540.  3. Measure your height in inches (in). Then multiply that number by itself to get a measurement called \"inches squared.\"  ? For example, for a person who is 70 in tall, the \"inches squared\" measurement is 70 in x 70 in, which equals 4900 inches squared.  4. Divide the total from Step 2 (number of lb x 703) by the total from Step 3 (inches squared): 126,540 ÷ 4900 = 25.8. This is your BMI.  To calculate your BMI in metric measurements, your health care provider will:  1. Measure your weight in kilograms (kg).  2. Measure your height in meters (m). Then multiply that number by itself to get a measurement called \"meters squared.\"  ? For example, for a person who is 1.75 m tall, the \"meters squared\" measurement is 1.75 m x 1.75 m, which is equal to 3.1 meters squared.  3. Divide the number of kilograms (your weight) by the meters squared number. In this example: 70 ÷ 3.1 = 22.6. This is your BMI.  How is BMI interpreted?  To interpret your " results, your health care provider will use BMI charts to identify whether you are underweight, normal weight, overweight, or obese. The following guidelines will be used:  · Underweight: BMI less than 18.5.  · Normal weight: BMI between 18.5 and 24.9.  · Overweight: BMI between 25 and 29.9.  · Obese: BMI of 30 and above.  Please note:  · Weight includes both fat and muscle, so someone with a muscular build, such as an athlete, may have a BMI that is higher than 24.9. In cases like these, BMI is not an accurate measure of body fat.  · To determine if excess body fat is the cause of a BMI of 25 or higher, further assessments may need to be done by a health care provider.  · BMI is usually interpreted in the same way for men and women.  Why is BMI a useful tool?  BMI is useful in two ways:  · Identifying a weight problem that may be related to a medical condition, or that may increase the risk for medical problems.  · Promoting lifestyle and diet changes in order to reach a healthy weight.  Summary  · Body mass index (BMI) is a number that is calculated from a person's weight and height.  · BMI may help to estimate how much of a person's weight is composed of fat. BMI can help identify those who may be at higher risk for certain medical problems.  · BMI can be measured using English measurements or metric measurements.  · To interpret your results, your health care provider will use BMI charts to identify whether you are underweight, normal weight, overweight, or obese.  This information is not intended to replace advice given to you by your health care provider. Make sure you discuss any questions you have with your health care provider.  Document Released: 08/29/2005 Document Revised: 10/31/2018 Document Reviewed: 10/31/2018  [employer] Interactive Patient Education © 2020 [employer]      Steps to Quit Smoking    Smoking tobacco can be bad for your health. It can also affect almost every organ in your body. Smoking  puts you and people around you at risk for many serious long-lasting (chronic) diseases. Quitting smoking is hard, but it is one of the best things that you can do for your health. It is never too late to quit.  What are the benefits of quitting smoking?  When you quit smoking, you lower your risk for getting serious diseases and conditions. They can include:  · Lung cancer or lung disease.  · Heart disease.  · Stroke.  · Heart attack.  · Not being able to have children (infertility).  · Weak bones (osteoporosis) and broken bones (fractures).  If you have coughing, wheezing, and shortness of breath, those symptoms may get better when you quit. You may also get sick less often. If you are pregnant, quitting smoking can help to lower your chances of having a baby of low birth weight.  What can I do to help me quit smoking?  Talk with your doctor about what can help you quit smoking. Some things you can do (strategies) include:  · Quitting smoking totally, instead of slowly cutting back how much you smoke over a period of time.  · Going to in-person counseling. You are more likely to quit if you go to many counseling sessions.  · Using resources and support systems, such as:  ? Online chats with a counselor.  ? Phone quitlines.  ? Printed self-help materials.  ? Support groups or group counseling.  ? Text messaging programs.  ? Mobile phone apps or applications.  · Taking medicines. Some of these medicines may have nicotine in them. If you are pregnant or breastfeeding, do not take any medicines to quit smoking unless your doctor says it is okay. Talk with your doctor about counseling or other things that can help you.  Talk with your doctor about using more than one strategy at the same time, such as taking medicines while you are also going to in-person counseling. This can help make quitting easier.  What things can I do to make it easier to quit?  Quitting smoking might feel very hard at first, but there is a lot  that you can do to make it easier. Take these steps:  · Talk to your family and friends. Ask them to support and encourage you.  · Call phone quitlines, reach out to support groups, or work with a counselor.  · Ask people who smoke to not smoke around you.  · Avoid places that make you want (trigger) to smoke, such as:  ? Bars.  ? Parties.  ? Smoke-break areas at work.  · Spend time with people who do not smoke.  · Lower the stress in your life. Stress can make you want to smoke. Try these things to help your stress:  ? Getting regular exercise.  ? Deep-breathing exercises.  ? Yoga.  ? Meditating.  ? Doing a body scan. To do this, close your eyes, focus on one area of your body at a time from head to toe, and notice which parts of your body are tense. Try to relax the muscles in those areas.  · Download or buy apps on your mobile phone or tablet that can help you stick to your quit plan. There are many free apps, such as QuitGuide from the CDC (Centers for Disease Control and Prevention). You can find more support from smokefree.gov and other websites.  This information is not intended to replace advice given to you by your health care provider. Make sure you discuss any questions you have with your health care provider.  Document Released: 10/14/2010 Document Revised: 08/15/2017 Document Reviewed: 05/03/2016  [employer] Interactive Patient Education © 2020 [employer]

## 2020-05-12 NOTE — PROGRESS NOTES
Chief complaint:   Chief Complaint   Patient presents with   • Left leg weakness     Manuel is returning today with several issues, he has a left cubital tunnel but also complains today with left leg weakness with back pain, he states the leg issue is causing him to fall, he feels like he drags the left leg.  Went to the ED about a week ago and had some imaging done.        Subjective     HPI: This is a 65-year-old male gentleman who we have been following for neck and left upper extremity pain.  He is here in follow-up today.  He comes in today with continued complaint of neck pain and left arm pain and numbness and tingling.  He does have known cubital tunnel syndrome on the left.  The patient would like to have this addressed however he states that a couple weeks ago he went to the emergency room due to some problems he was having with his chronic leg wound.  He also expressed to the emergency room that he was having weakness on his left side in particular in his left lower extremity.  He had a CAT scan done which did not show any acute issue.  He says that he has had increasing difficulty with walking.  He has started to use a walker over the last couple of weeks.  He says the pain in his back is constant but is worse with lifting and bending but says that certain positions can help with the pain.  He denies any lower extremity pain or numbness and tingling.  He does feel like his walking has declined over the last month.  Denies any vision changes or headaches.  He does have some difficulty with dizziness.  Denies any bowel or bladder incontinence.  Rates his pain on a scale of 0-10 at a 6.  He says it does interfere with his actives of daily living.  He does feel like the weakness is over on the left side but he has difficulty in telling if the weakness in his arm is due to the cubital tunnel release versus another problem that may be occurring.  He has coronary stents and is currently taking  "Plavix.    Review of Systems   Musculoskeletal: Positive for back pain and neck pain.   Neurological: Positive for weakness and numbness.        Past Medical History:   Diagnosis Date   • Arthritis    • Diabetes mellitus (CMS/HCC)    • Diabetic neuropathy (CMS/HCC)    • Hypertension    • Nerve damage     L forearm/hand secondary to compound fx     Past Surgical History:   Procedure Laterality Date   • CARDIAC SURGERY      5 stents   • CARPAL TUNNEL RELEASE     • KNEE SURGERY Right    • OTHER SURGICAL HISTORY      compound fracture of the L arm     Family History   Problem Relation Age of Onset   • Stroke Mother    • Cancer Mother         breast cancer x2   • Diabetes Mother    • Heart disease Mother    • Hypertension Mother    • Heart disease Father    • Arthritis Father    • Hypertension Father      Social History     Tobacco Use   • Smoking status: Current Every Day Smoker     Packs/day: 0.50   • Smokeless tobacco: Never Used   Substance Use Topics   • Alcohol use: Yes   • Drug use: No       (Not in a hospital admission)  Allergies:  Iodine; Shellfish-derived products; and Sulfa antibiotics    Objective      Vital Signs  /100 (BP Location: Right arm, Patient Position: Sitting)   Ht 172.7 cm (68\")   Wt 132 kg (290 lb)   BMI 44.09 kg/m²     Physical Exam   Constitutional: He is oriented to person, place, and time. He appears well-developed and well-nourished.   HENT:   Head: Normocephalic.   Eyes: Pupils are equal, round, and reactive to light. EOM are normal.   Neck: Normal range of motion.   Pulmonary/Chest: Effort normal.   Musculoskeletal: Normal range of motion.        Cervical back: He exhibits pain.   Neurological: He is alert and oriented to person, place, and time. No cranial nerve deficit or sensory deficit. Gait normal. GCS eye subscore is 4. GCS verbal subscore is 5. GCS motor subscore is 6.   Reflex Scores:       Tricep reflexes are 1+ on the right side and 2+ on the left side.       Bicep " reflexes are 1+ on the right side and 2+ on the left side.       Patellar reflexes are 0 on the right side and 2+ on the left side.       Achilles reflexes are 0 on the right side and 0 on the left side.  Skin: Skin is warm.   Psychiatric: He has a normal mood and affect. His speech is normal and behavior is normal. Thought content normal.       Neurologic Exam     Mental Status   Oriented to person, place, and time.   Attention: normal. Concentration: normal.   Speech: speech is normal   Level of consciousness: alert  Normal comprehension.     Cranial Nerves   Cranial nerves II through XII intact.     CN III, IV, VI   Pupils are equal, round, and reactive to light.  Extraocular motions are normal.     Motor Exam   Muscle bulk: normal    Strength   Strength 5/5 except as noted.   Left iliopsoas: 4/5Left  4 out of 5     Sensory Exam   Light touch normal.     Gait, Coordination, and Reflexes     Reflexes   Right biceps: 1+  Left biceps: 2+  Right triceps: 1+  Left triceps: 2+  Right patellar: 0  Left patellar: 2+  Right achilles: 0  Left achilles: 0  Right Dunbar: absent  Left Dunbar: absent  Right ankle clonus: absent  Left ankle clonus: absentPatient walks bent over to the right to compensate for left-sided weakness       Results Review: EMG/NCS of the left upper extremity that was done on February 25, 2020 shows a severe left ulnar nerve compromise.  There is also mild medial nerve compromise.  No cervical radiculopathy noted       CT scan of the lumbar spine that was done [facility] at [facility] shows the patient does have a spondylolisthesis at L4-5.  Disc degeneration is also noted at L3-4 and L4-5.  There is degenerative scoliosis noted in his back as well.  There does appear to be lumbar stenosis present at L4-5 as well.  No fracture visualized      Assessment/Plan: Because the patient is complaining of weakness in both upper and lower extremities I do feel like it would be appropriate to send the  patient for an MRI of the brain with and without contrast to make sure that there is no area of stroke versus brain tumor.  I would also like to send the patient for an MRI of the lumbar spine without contrast to further evaluate the stenosed area.  We will follow-up with him after imaging is completed and have him see Dr. Flowers at the next appointment.  Should the imaging studies be negative he can talk to Dr. Flowers about the cubital tunnel syndrome and see if surgery would be appropriate for him.  If he did have the surgery for cubital tunnel consideration for [place] block would probably need to be considered for the patient as well given his heart history.  Patient is a smoker.  Smoking cessation classes given to the patient  BMI shows the patient is Extremely Obese. BMI chart was given to the patient.     Manuel was seen today for left leg weakness.    Diagnoses and all orders for this visit:    Left-sided weakness  -     MRI Brain With & Without Contrast; Future    Cubital tunnel syndrome on left    Chronic bilateral low back pain without sciatica  -     MRI Lumbar Spine Without Contrast; Future    Smoker    BMI 40.0-44.9, adult (CMS/[facility])          I discussed the patients findings and my recommendations with patient    Jj Barlow, JASSON  05/12/20  12:19

## 2020-05-14 ENCOUNTER — TELEPHONE (OUTPATIENT)
Dept: NEUROSURGERY | Facility: CLINIC | Age: 65
End: 2020-05-14

## 2020-05-14 NOTE — TELEPHONE ENCOUNTER
Called and spoke to patient about getting the MRI scheduled.  There was a misunderstanding as to whether or not we would be able to do something for the patient.  At this point he is agreeable to have the imaging done.  We will call and get this scheduled for him

## 2020-06-15 ENCOUNTER — TELEPHONE (OUTPATIENT)
Dept: NEUROSURGERY | Facility: CLINIC | Age: 65
End: 2020-06-15

## 2020-06-15 NOTE — TELEPHONE ENCOUNTER
Tried to reach patient by ALL three number listed in his chart regarding the scheduling of ordered testing, left message on cell phone but was not able to reach him by any of the numbers.    If patient calls back we need to know if he intends to have the testing done, scheduling has not been able to reach him with an appointment.     His return appointment in July with Dr. Flowers for results, but if testing is not done this needs to be canceled.

## 2020-10-09 ENCOUNTER — APPOINTMENT (OUTPATIENT)
Dept: GENERAL RADIOLOGY | Facility: HOSPITAL | Age: 65
End: 2020-10-09

## 2020-10-09 ENCOUNTER — APPOINTMENT (OUTPATIENT)
Dept: ULTRASOUND IMAGING | Facility: HOSPITAL | Age: 65
End: 2020-10-09

## 2020-10-09 ENCOUNTER — APPOINTMENT (OUTPATIENT)
Dept: CT IMAGING | Facility: HOSPITAL | Age: 65
End: 2020-10-09

## 2020-10-09 ENCOUNTER — HOSPITAL ENCOUNTER (INPATIENT)
Facility: HOSPITAL | Age: 65
LOS: 5 days | Discharge: NURSING, REHAB OR LONG-TERM CARE | End: 2020-10-14
Attending: INTERNAL MEDICINE | Admitting: FAMILY MEDICINE

## 2020-10-09 DIAGNOSIS — I82.412 ACUTE DEEP VEIN THROMBOSIS (DVT) OF FEMORAL VEIN OF LEFT LOWER EXTREMITY (HCC): ICD-10-CM

## 2020-10-09 DIAGNOSIS — N17.9 AKI (ACUTE KIDNEY INJURY) (HCC): Primary | ICD-10-CM

## 2020-10-09 DIAGNOSIS — L03.119 CELLULITIS OF LOWER EXTREMITY, UNSPECIFIED LATERALITY: ICD-10-CM

## 2020-10-09 DIAGNOSIS — Z74.09 IMPAIRED MOBILITY AND ADLS: ICD-10-CM

## 2020-10-09 DIAGNOSIS — Z78.9 DECREASED ACTIVITIES OF DAILY LIVING (ADL): ICD-10-CM

## 2020-10-09 DIAGNOSIS — Z74.09 IMPAIRED FUNCTIONAL MOBILITY, BALANCE, GAIT, AND ENDURANCE: ICD-10-CM

## 2020-10-09 DIAGNOSIS — Z78.9 IMPAIRED MOBILITY AND ADLS: ICD-10-CM

## 2020-10-09 LAB
ALBUMIN SERPL-MCNC: 3.4 G/DL (ref 3.5–5.2)
ALBUMIN/GLOB SERPL: 0.9 G/DL
ALP SERPL-CCNC: 22 U/L (ref 39–117)
ALT SERPL W P-5'-P-CCNC: 17 U/L (ref 1–41)
ANION GAP SERPL CALCULATED.3IONS-SCNC: 12 MMOL/L (ref 5–15)
APTT PPP: 32.2 SECONDS (ref 24.1–35)
APTT PPP: 35.2 SECONDS (ref 24.1–35)
APTT PPP: 38.7 SECONDS (ref 24.1–35)
AST SERPL-CCNC: 21 U/L (ref 1–40)
BASOPHILS # BLD AUTO: 0.03 10*3/MM3 (ref 0–0.2)
BASOPHILS NFR BLD AUTO: 0.3 % (ref 0–1.5)
BILIRUB SERPL-MCNC: 0.3 MG/DL (ref 0–1.2)
BUN SERPL-MCNC: 73 MG/DL (ref 8–23)
BUN/CREAT SERPL: 25.1 (ref 7–25)
CALCIUM SPEC-SCNC: 9.6 MG/DL (ref 8.6–10.5)
CHLORIDE SERPL-SCNC: 102 MMOL/L (ref 98–107)
CO2 SERPL-SCNC: 21 MMOL/L (ref 22–29)
CREAT SERPL-MCNC: 2.91 MG/DL (ref 0.76–1.27)
D-LACTATE SERPL-SCNC: 1.4 MMOL/L (ref 0.5–2)
DEPRECATED RDW RBC AUTO: 60.7 FL (ref 37–54)
EOSINOPHIL # BLD AUTO: 0.09 10*3/MM3 (ref 0–0.4)
EOSINOPHIL NFR BLD AUTO: 1 % (ref 0.3–6.2)
ERYTHROCYTE [DISTWIDTH] IN BLOOD BY AUTOMATED COUNT: 19.4 % (ref 12.3–15.4)
GFR SERPL CREATININE-BSD FRML MDRD: 22 ML/MIN/1.73
GLOBULIN UR ELPH-MCNC: 3.9 GM/DL
GLUCOSE BLDC GLUCOMTR-MCNC: 130 MG/DL (ref 70–130)
GLUCOSE SERPL-MCNC: 104 MG/DL (ref 65–99)
HBA1C MFR BLD: 5.8 % (ref 4.8–5.6)
HCT VFR BLD AUTO: 31.4 % (ref 37.5–51)
HGB BLD-MCNC: 10 G/DL (ref 13–17.7)
IMM GRANULOCYTES # BLD AUTO: 0.18 10*3/MM3 (ref 0–0.05)
IMM GRANULOCYTES NFR BLD AUTO: 2 % (ref 0–0.5)
INR PPP: 1.11 (ref 0.91–1.09)
LYMPHOCYTES # BLD AUTO: 0.91 10*3/MM3 (ref 0.7–3.1)
LYMPHOCYTES NFR BLD AUTO: 10 % (ref 19.6–45.3)
MCH RBC QN AUTO: 27.2 PG (ref 26.6–33)
MCHC RBC AUTO-ENTMCNC: 31.8 G/DL (ref 31.5–35.7)
MCV RBC AUTO: 85.3 FL (ref 79–97)
MONOCYTES # BLD AUTO: 0.91 10*3/MM3 (ref 0.1–0.9)
MONOCYTES NFR BLD AUTO: 10 % (ref 5–12)
NEUTROPHILS NFR BLD AUTO: 6.94 10*3/MM3 (ref 1.7–7)
NEUTROPHILS NFR BLD AUTO: 76.7 % (ref 42.7–76)
NRBC BLD AUTO-RTO: 0 /100 WBC (ref 0–0.2)
NT-PROBNP SERPL-MCNC: 230.6 PG/ML (ref 0–900)
PLATELET # BLD AUTO: 455 10*3/MM3 (ref 140–450)
PMV BLD AUTO: 10.6 FL (ref 6–12)
POTASSIUM SERPL-SCNC: 5.3 MMOL/L (ref 3.5–5.2)
PROT SERPL-MCNC: 7.3 G/DL (ref 6–8.5)
PROTHROMBIN TIME: 13.9 SECONDS (ref 11.9–14.6)
RBC # BLD AUTO: 3.68 10*6/MM3 (ref 4.14–5.8)
SARS-COV-2 RDRP RESP QL NAA+PROBE: NOT DETECTED
SODIUM SERPL-SCNC: 135 MMOL/L (ref 136–145)
WBC # BLD AUTO: 9.06 10*3/MM3 (ref 3.4–10.8)

## 2020-10-09 PROCEDURE — 85610 PROTHROMBIN TIME: CPT | Performed by: NURSE PRACTITIONER

## 2020-10-09 PROCEDURE — 25010000002 VANCOMYCIN: Performed by: NURSE PRACTITIONER

## 2020-10-09 PROCEDURE — 93010 ELECTROCARDIOGRAM REPORT: CPT | Performed by: INTERNAL MEDICINE

## 2020-10-09 PROCEDURE — 83880 ASSAY OF NATRIURETIC PEPTIDE: CPT | Performed by: FAMILY MEDICINE

## 2020-10-09 PROCEDURE — 93005 ELECTROCARDIOGRAM TRACING: CPT | Performed by: NURSE PRACTITIONER

## 2020-10-09 PROCEDURE — 87147 CULTURE TYPE IMMUNOLOGIC: CPT | Performed by: NURSE PRACTITIONER

## 2020-10-09 PROCEDURE — 87635 SARS-COV-2 COVID-19 AMP PRB: CPT | Performed by: NURSE PRACTITIONER

## 2020-10-09 PROCEDURE — 25010000002 PIPERACILLIN SOD-TAZOBACTAM PER 1 G: Performed by: INTERNAL MEDICINE

## 2020-10-09 PROCEDURE — 99283 EMERGENCY DEPT VISIT LOW MDM: CPT

## 2020-10-09 PROCEDURE — 87186 SC STD MICRODIL/AGAR DIL: CPT | Performed by: NURSE PRACTITIONER

## 2020-10-09 PROCEDURE — 83605 ASSAY OF LACTIC ACID: CPT | Performed by: NURSE PRACTITIONER

## 2020-10-09 PROCEDURE — 87077 CULTURE AEROBIC IDENTIFY: CPT | Performed by: NURSE PRACTITIONER

## 2020-10-09 PROCEDURE — 25010000002 ENOXAPARIN PER 10 MG: Performed by: FAMILY MEDICINE

## 2020-10-09 PROCEDURE — 73630 X-RAY EXAM OF FOOT: CPT

## 2020-10-09 PROCEDURE — 83036 HEMOGLOBIN GLYCOSYLATED A1C: CPT | Performed by: FAMILY MEDICINE

## 2020-10-09 PROCEDURE — 36415 COLL VENOUS BLD VENIPUNCTURE: CPT | Performed by: NURSE PRACTITIONER

## 2020-10-09 PROCEDURE — 73590 X-RAY EXAM OF LOWER LEG: CPT

## 2020-10-09 PROCEDURE — 87181 SC STD AGAR DILUTION PER AGT: CPT | Performed by: NURSE PRACTITIONER

## 2020-10-09 PROCEDURE — 85730 THROMBOPLASTIN TIME PARTIAL: CPT | Performed by: NURSE PRACTITIONER

## 2020-10-09 PROCEDURE — 93970 EXTREMITY STUDY: CPT

## 2020-10-09 PROCEDURE — 87070 CULTURE OTHR SPECIMN AEROBIC: CPT | Performed by: NURSE PRACTITIONER

## 2020-10-09 PROCEDURE — 87040 BLOOD CULTURE FOR BACTERIA: CPT | Performed by: NURSE PRACTITIONER

## 2020-10-09 PROCEDURE — 82962 GLUCOSE BLOOD TEST: CPT

## 2020-10-09 PROCEDURE — 85025 COMPLETE CBC W/AUTO DIFF WBC: CPT | Performed by: NURSE PRACTITIONER

## 2020-10-09 PROCEDURE — 25010000002 PIPERACILLIN SOD-TAZOBACTAM PER 1 G: Performed by: NURSE PRACTITIONER

## 2020-10-09 PROCEDURE — 80053 COMPREHEN METABOLIC PANEL: CPT | Performed by: NURSE PRACTITIONER

## 2020-10-09 PROCEDURE — 87205 SMEAR GRAM STAIN: CPT | Performed by: NURSE PRACTITIONER

## 2020-10-09 PROCEDURE — 70450 CT HEAD/BRAIN W/O DYE: CPT

## 2020-10-09 PROCEDURE — C9803 HOPD COVID-19 SPEC COLLECT: HCPCS | Performed by: NURSE PRACTITIONER

## 2020-10-09 PROCEDURE — 93970 EXTREMITY STUDY: CPT | Performed by: SURGERY

## 2020-10-09 RX ORDER — ASPIRIN 81 MG/1
81 TABLET ORAL DAILY
Status: DISCONTINUED | OUTPATIENT
Start: 2020-10-10 | End: 2020-10-10

## 2020-10-09 RX ORDER — DEXTROSE MONOHYDRATE 25 G/50ML
25 INJECTION, SOLUTION INTRAVENOUS
Status: DISCONTINUED | OUTPATIENT
Start: 2020-10-09 | End: 2020-10-14 | Stop reason: HOSPADM

## 2020-10-09 RX ORDER — SODIUM CHLORIDE 9 MG/ML
75 INJECTION, SOLUTION INTRAVENOUS CONTINUOUS
Status: DISCONTINUED | OUTPATIENT
Start: 2020-10-09 | End: 2020-10-14 | Stop reason: HOSPADM

## 2020-10-09 RX ORDER — VANCOMYCIN HYDROCHLORIDE 1 G/200ML
1000 INJECTION, SOLUTION INTRAVENOUS EVERY 24 HOURS
Status: DISCONTINUED | OUTPATIENT
Start: 2020-10-10 | End: 2020-10-09

## 2020-10-09 RX ORDER — CLOPIDOGREL BISULFATE 75 MG/1
75 TABLET ORAL DAILY
Status: DISCONTINUED | OUTPATIENT
Start: 2020-10-10 | End: 2020-10-14 | Stop reason: HOSPADM

## 2020-10-09 RX ORDER — ACETAMINOPHEN 325 MG/1
650 TABLET ORAL EVERY 4 HOURS PRN
Status: DISCONTINUED | OUTPATIENT
Start: 2020-10-09 | End: 2020-10-14 | Stop reason: HOSPADM

## 2020-10-09 RX ORDER — SODIUM CHLORIDE 0.9 % (FLUSH) 0.9 %
10 SYRINGE (ML) INJECTION AS NEEDED
Status: DISCONTINUED | OUTPATIENT
Start: 2020-10-09 | End: 2020-10-12 | Stop reason: SDUPTHER

## 2020-10-09 RX ORDER — ATORVASTATIN CALCIUM 40 MG/1
80 TABLET, FILM COATED ORAL DAILY
Status: DISCONTINUED | OUTPATIENT
Start: 2020-10-10 | End: 2020-10-14 | Stop reason: HOSPADM

## 2020-10-09 RX ORDER — NICOTINE POLACRILEX 4 MG
15 LOZENGE BUCCAL
Status: DISCONTINUED | OUTPATIENT
Start: 2020-10-09 | End: 2020-10-14 | Stop reason: HOSPADM

## 2020-10-09 RX ORDER — SODIUM CHLORIDE 0.9 % (FLUSH) 0.9 %
10 SYRINGE (ML) INJECTION AS NEEDED
Status: DISCONTINUED | OUTPATIENT
Start: 2020-10-09 | End: 2020-10-14 | Stop reason: HOSPADM

## 2020-10-09 RX ORDER — METOPROLOL SUCCINATE 50 MG/1
50 TABLET, EXTENDED RELEASE ORAL DAILY
Status: DISCONTINUED | OUTPATIENT
Start: 2020-10-10 | End: 2020-10-12

## 2020-10-09 RX ORDER — SODIUM CHLORIDE 0.9 % (FLUSH) 0.9 %
10 SYRINGE (ML) INJECTION EVERY 12 HOURS SCHEDULED
Status: DISCONTINUED | OUTPATIENT
Start: 2020-10-09 | End: 2020-10-14 | Stop reason: HOSPADM

## 2020-10-09 RX ORDER — PANTOPRAZOLE SODIUM 40 MG/1
40 TABLET, DELAYED RELEASE ORAL
Status: DISCONTINUED | OUTPATIENT
Start: 2020-10-10 | End: 2020-10-14 | Stop reason: HOSPADM

## 2020-10-09 RX ADMIN — TAZOBACTAM SODIUM AND PIPERACILLIN SODIUM 3.38 G: 375; 3 INJECTION, SOLUTION INTRAVENOUS at 23:15

## 2020-10-09 RX ADMIN — PIPERACILLIN SODIUM AND TAZOBACTAM SODIUM 3.38 G: 3; .375 INJECTION, POWDER, LYOPHILIZED, FOR SOLUTION INTRAVENOUS at 17:27

## 2020-10-09 RX ADMIN — ENOXAPARIN SODIUM 120 MG: 120 INJECTION SUBCUTANEOUS at 23:16

## 2020-10-09 RX ADMIN — SODIUM CHLORIDE 100 ML/HR: 9 INJECTION, SOLUTION INTRAVENOUS at 23:20

## 2020-10-09 RX ADMIN — SODIUM CHLORIDE, PRESERVATIVE FREE 10 ML: 5 INJECTION INTRAVENOUS at 23:16

## 2020-10-09 RX ADMIN — VANCOMYCIN HYDROCHLORIDE 2000 MG: 1 INJECTION, POWDER, LYOPHILIZED, FOR SOLUTION INTRAVENOUS at 18:52

## 2020-10-09 NOTE — ED PROVIDER NOTES
Subjective   65 yom presents with c/o redness, swelling and drainage to bilateral lower legs and feet.  He states he has been 'fighting infection' in his legs for about seven months.  He states he has been seeing wound care in [place].  He states he recently took clindamycin and it was not effective so it was switched to cipro. He states he doesn't feel he is improving.  He states he is continuing to have foul drainage and continuous weeping of the lower legs and feet.  He denies fever.  His wife states he is also having increased weakness and they are concerned.  No unusual shortness of breath. No chest pain.  He is alert and answers all questions appropriately. He adds he had surgery on the R great toe a few months ago in [place].           Review of Systems   Constitutional: Negative for activity change, appetite change, fatigue and fever.   HENT: Negative for congestion, ear pain, facial swelling and sore throat.    Eyes: Negative for discharge and visual disturbance.   Respiratory: Negative for apnea, chest tightness, shortness of breath, wheezing and stridor.    Cardiovascular: Negative for chest pain and palpitations.   Gastrointestinal: Negative for abdominal distention, abdominal pain, diarrhea, nausea and vomiting.   Genitourinary: Negative for difficulty urinating and dysuria.   Musculoskeletal: Negative for arthralgias and myalgias.   Skin: Positive for rash and wound.   Neurological: Negative for dizziness and seizures.   Psychiatric/Behavioral: Negative for agitation and confusion.       Past Medical History:   Diagnosis Date   • Arthritis    • Diabetes mellitus (CMS/HCC)    • Diabetic neuropathy (CMS/HCC)    • Hypertension    • Nerve damage     L forearm/hand secondary to compound fx       Allergies   Allergen Reactions   • Iodine Unknown - High Severity   • Shellfish-Derived Products Anaphylaxis   • Sulfa Antibiotics Hives       Past Surgical History:   Procedure Laterality Date   • CARDIAC SURGERY       5 stents   • CARPAL TUNNEL RELEASE     • KNEE SURGERY Right    • OTHER SURGICAL HISTORY      compound fracture of the L arm       Family History   Problem Relation Age of Onset   • Stroke Mother    • Cancer Mother         breast cancer x2   • Diabetes Mother    • Heart disease Mother    • Hypertension Mother    • Heart disease Father    • Arthritis Father    • Hypertension Father        Social History     Socioeconomic History   • Marital status:      Spouse name: Not on file   • Number of children: Not on file   • Years of education: Not on file   • Highest education level: Not on file   Tobacco Use   • Smoking status: Current Every Day Smoker     Packs/day: 0.50   • Smokeless tobacco: Never Used   Substance and Sexual Activity   • Alcohol use: Yes   • Drug use: No   • Sexual activity: Defer           Objective   Physical Exam  Vitals signs and nursing note reviewed.   Constitutional:       Appearance: He is well-developed.   HENT:      Head: Normocephalic.   Eyes:      Pupils: Pupils are equal, round, and reactive to light.   Neck:      Musculoskeletal: Normal range of motion and neck supple.   Cardiovascular:      Rate and Rhythm: Normal rate and regular rhythm.      Heart sounds: No murmur.   Pulmonary:      Effort: Pulmonary effort is normal.      Breath sounds: Decreased breath sounds present.   Abdominal:      General: Bowel sounds are normal.      Palpations: Abdomen is soft.   Musculoskeletal:      Right lower leg: He exhibits tenderness and swelling. He exhibits no deformity. Edema present.      Left lower leg: He exhibits tenderness and swelling. He exhibits no deformity. Edema present.        Legs:       Right foot: Decreased range of motion. Tenderness and swelling present.      Left foot: Decreased range of motion. Tenderness and swelling present.        Feet:       Comments: Gross amounts of redness and swelling present to bilateral lower legs and feet. The skin is excoriated. There is  drainage from both legs.  The drainage is foul smelling. He has a healing wound to the R great toe.   Skin:     General: Skin is warm and dry.   Neurological:      Mental Status: He is alert and oriented to person, place, and time.         Procedures            Current Facility-Administered Medications:   •  [COMPLETED] Insert peripheral IV, , , Once **AND** sodium chloride 0.9 % flush 10 mL, 10 mL, Intravenous, PRN, Shoulders, Kristee Croft, APRN  •  vancomycin 2000 mg/500 mL 0.9% NS IVPB ([facility]), 2,000 mg, Intravenous, Once, Shoulders, Kristee Croft, APRN    Current Outpatient Medications:   •  aspirin (ECOTRIN LOW STRENGTH) 81 MG EC tablet, Take 81 mg by mouth., Disp: , Rfl:   •  atorvastatin (LIPITOR) 80 MG tablet, Take 80 mg by mouth., Disp: , Rfl:   •  cefdinir (OMNICEF) 300 MG capsule, , Disp: , Rfl:   •  clopidogrel (PLAVIX) 75 MG tablet, Take 75 mg by mouth., Disp: , Rfl:   •  clotrimazole-betamethasone (LOTRISONE) 1-0.05 % cream, , Disp: , Rfl:   •  Empagliflozin (Jardiance) 10 MG tablet, , Disp: , Rfl:   •  fenofibric acid (TRILIPIX) 135 MG capsule delayed-release delayed release capsule, Take 135 mg by mouth Daily., Disp: , Rfl:   •  Insulin Glargine (LANTUS SOLOSTAR) 100 UNIT/ML injection pen, Inject 50 Units under the skin., Disp: , Rfl:   •  metoprolol succinate XL (TOPROL-XL) 50 MG 24 hr tablet, Take 50 mg by mouth., Disp: , Rfl:   •  Multiple Vitamins-Minerals (MULTIVITAMIN WITH MINERALS) tablet tablet, Take 1 tablet by mouth Daily., Disp: , Rfl:   •  omega-3 acid ethyl esters (LOVAZA) 1 g capsule, Take 1 g by mouth 4 (Four) Times a Day., Disp: , Rfl:   •  omeprazole (priLOSEC) 20 MG capsule, Take 20 mg by mouth Daily., Disp: , Rfl:   •  ONE TOUCH ULTRA TEST test strip, CHECK BLOOD SUGAR 3 TIMES A DAY *E11.9*, Disp: , Rfl:   •  pentoxifylline (TRENtal) 400 MG CR tablet, Take 400 mg by mouth 3 (Three) Times a Day., Disp: , Rfl:   •  potassium chloride (K-DUR,KLOR-CON) 20 MEQ CR tablet, Take 40 mEq by  "mouth., Disp: , Rfl:   •  pregabalin (LYRICA) 75 MG capsule, Take 75 mg by mouth., Disp: , Rfl:   •  REGRANEX 0.01 % gel, APPLY THIN LAYER TO AFFECTED AREA ONCE DAILY EVERY 12 HOURS ON, 12 HOURS OFF, Disp: , Rfl:   •  SITagliptin-MetFORMIN HCl ER (JANUMET XR)  MG tablet, Take 1 tablet by mouth., Disp: , Rfl:   •  spironolactone (ALDACTONE) 25 MG tablet, Take 25 mg by mouth., Disp: , Rfl:   •  tiZANidine (ZANAFLEX) 4 MG tablet, Take 4 mg by mouth 3 (Three) Times a Day As Needed., Disp: , Rfl:   •  triamterene-hydrochlorothiazide (DYAZIDE) 37.5-25 MG per capsule, Take 1 capsule by mouth., Disp: , Rfl:   •  valsartan (DIOVAN) 80 MG tablet, Take 80 mg by mouth., Disp: , Rfl:     Vital signs:  /59 (BP Location: Left arm, Patient Position: Sitting)   Pulse 86   Temp 98 °F (36.7 °C) (Oral)   Resp 17   Ht 172.7 cm (68\")   Wt 120 kg (264 lb)   SpO2 95%   BMI 40.14 kg/m²        ED LAB RESULTS:   Lab Results (last 24 hours)     Procedure Component Value Units Date/Time    CBC & Differential [[id number]]  (Abnormal) Collected: 10/09/20 1422    Specimen: Blood Updated: 10/09/20 1432    Narrative:      The following orders were created for panel order CBC & Differential.  Procedure                               Abnormality         Status                     ---------                               -----------         ------                     CBC Auto Differential[[id number]]        Abnormal            Final result                 Please view results for these tests on the individual orders.    Protime-INR [[id number]]  (Abnormal) Collected: 10/09/20 1422    Specimen: Blood Updated: 10/09/20 1441     Protime 13.9 Seconds      INR 1.11    aPTT [[id number]]  (Normal) Collected: 10/09/20 1422    Specimen: Blood Updated: 10/09/20 1441     PTT 32.2 seconds     Blood Culture With RONALDO - Blood, Arm, Left [[id number]] Collected: 10/09/20 1422    Specimen: Blood from Arm, Left Updated: 10/09/20 1442    Lactic Acid, Plasma " [[id number]]  (Normal) Collected: 10/09/20 1422    Specimen: Blood Updated: 10/09/20 1449     Lactate 1.4 mmol/L     CBC Auto Differential [[id number]]  (Abnormal) Collected: 10/09/20 1422    Specimen: Blood Updated: 10/09/20 1432     WBC 9.06 10*3/mm3      RBC 3.68 10*6/mm3      Hemoglobin 10.0 g/dL      Hematocrit 31.4 %      MCV 85.3 fL      MCH 27.2 pg      MCHC 31.8 g/dL      RDW 19.4 %      RDW-SD 60.7 fl      MPV 10.6 fL      Platelets 455 10*3/mm3      Neutrophil % 76.7 %      Lymphocyte % 10.0 %      Monocyte % 10.0 %      Eosinophil % 1.0 %      Basophil % 0.3 %      Immature Grans % 2.0 %      Neutrophils, Absolute 6.94 10*3/mm3      Lymphocytes, Absolute 0.91 10*3/mm3      Monocytes, Absolute 0.91 10*3/mm3      Eosinophils, Absolute 0.09 10*3/mm3      Basophils, Absolute 0.03 10*3/mm3      Immature Grans, Absolute 0.18 10*3/mm3      nRBC 0.0 /100 WBC     COVID PRE-OP / PRE-PROCEDURE SCREENING ORDER (NO ISOLATION) - Swab, Nasal Cavity [[id number]]  (Normal) Collected: 10/09/20 1424    Specimen: Swab from Nasal Cavity Updated: 10/09/20 1518    Narrative:      The following orders were created for panel order COVID PRE-OP / PRE-PROCEDURE SCREENING ORDER (NO ISOLATION) - Swab, Nasal Cavity.  Procedure                               Abnormality         Status                     ---------                               -----------         ------                     COVID-19, ABBOTT IN-HOUS...[[id number]]  Normal              Final result                 Please view results for these tests on the individual orders.    COVID-19, ABBOTT IN-HOUSE,NP Swab (NO TRANSPORT MEDIA) 2 HR TAT - Swab, Nasal Cavity [[id number]]  (Normal) Collected: 10/09/20 1424    Specimen: Swab from Nasal Cavity Updated: 10/09/20 1518     COVID19 Not Detected    Narrative:      Fact sheet for providers: https://www.fda.gov/media/803942/download     Fact sheet for patients: https://www.fda.gov/media/023038/download    Comprehensive Metabolic  Panel [[id number]]  (Abnormal) Collected: 10/09/20 1609    Specimen: Blood Updated: 10/09/20 1634     Glucose 104 mg/dL      BUN 73 mg/dL      Creatinine 2.91 mg/dL      Sodium 135 mmol/L      Potassium 5.3 mmol/L      Chloride 102 mmol/L      CO2 21.0 mmol/L      Calcium 9.6 mg/dL      Total Protein 7.3 g/dL      Albumin 3.40 g/dL      ALT (SGPT) 17 U/L      AST (SGOT) 21 U/L      Alkaline Phosphatase 22 U/L      Total Bilirubin 0.3 mg/dL      eGFR Non African Amer 22 mL/min/1.73      Globulin 3.9 gm/dL      A/G Ratio 0.9 g/dL      BUN/Creatinine Ratio 25.1     Anion Gap 12.0 mmol/L     Narrative:      GFR Normal >60  Chronic Kidney Disease <60  Kidney Failure <15      Wound Culture - Wound, Leg, Left [[id number]] Collected: 10/09/20 1609    Specimen: Wound from Leg, Left Updated: 10/09/20 1614             IMAGING RESULTS  US Venous Doppler Lower Extremity Bilateral (duplex)   ED Interpretation   See results below      CT Head Without Contrast   ED Interpretation   See results below      Preliminary Result   No hemorrhage, edema or mass effect. No acute findings.       The full report of this exam was immediately signed and available to the   emergency room. The patient is currently in the emergency room.       This report was finalized on 10/09/2020 15:07 by Dr. Salinas Morgan MD.      XR Foot 3+ View Right   ED Interpretation   See results below      Final Result   Impression:       Soft tissue swelling and prior radiation of the first digit, without   evidence of acute osteomyelitis..       This report was finalized on 10/09/2020 14:55 by Dr. Enedina Parmar MD.      XR Tibia Fibula 2 View Right   ED Interpretation   See results below      Final Result   Impression:       No acute osseous pathology.       This report was finalized on 10/09/2020 14:54 by Dr. Enedina Parmar MD.      XR Tibia Fibula 2 View Left   ED Interpretation   See results below      Final Result   1. No acute bony abnormality of the tibia or  fibula.   2. Degenerative osteoarthritis left knee.   3. Diffuse subcutaneous edema within the soft tissues. Vascular   calcification.       This report was finalized on 10/09/2020 14:54 by Dr. Salinas Morgan MD.                       ED Course  ED Course as of Oct 09 1759   Fri Oct 09, 2020   1715 I discussed the patient's history, assessment and testing results with Dr Orellana.  I will place a call to the hospitalist for admission.  We will defer the DVT treatment to the hospitalists.  The patient and his family voice understanding of admissions. We also discussed his renal function.  They are unaware of any previous issue.    [KS]   1750 I spoke with Dr Clemons, hospitalist.  We will admit to their services. Heparin ordered at his instruction     [KS]      ED Course User Index  [KS] Tony Hameed, JASSON                                           MDM  Number of Diagnoses or Management Options  Acute deep vein thrombosis (DVT) of femoral vein of left lower extremity (CMS/HCC): new and requires workup  MONI (acute kidney injury) (CMS/HCC): new and requires workup  Cellulitis of lower extremity, unspecified laterality: established and worsening     Amount and/or Complexity of Data Reviewed  Clinical lab tests: ordered and reviewed  Tests in the radiology section of CPT®: ordered and reviewed  Decide to obtain previous medical records or to obtain history from someone other than the patient: yes  Discuss the patient with other providers: yes  Independent visualization of images, tracings, or specimens: yes    Risk of Complications, Morbidity, and/or Mortality  Presenting problems: low  Diagnostic procedures: low  Management options: low    Patient Progress  Patient progress: stable      Final diagnoses:   MONI (acute kidney injury) (CMS/HCC)   Cellulitis of lower extremity, unspecified laterality   Acute deep vein thrombosis (DVT) of femoral vein of left lower extremity (CMS/HCC)            Tony Hameed  Max, JASSON  10/09/20 1800

## 2020-10-10 PROBLEM — I10 ESSENTIAL HYPERTENSION: Status: ACTIVE | Noted: 2020-10-10

## 2020-10-10 PROBLEM — Z72.0 TOBACCO ABUSE: Status: ACTIVE | Noted: 2018-06-25

## 2020-10-10 PROBLEM — I25.10 CORONARY ARTERY DISEASE INVOLVING NATIVE CORONARY ARTERY OF NATIVE HEART WITHOUT ANGINA PECTORIS: Status: ACTIVE | Noted: 2020-10-10

## 2020-10-10 LAB
ANION GAP SERPL CALCULATED.3IONS-SCNC: 13 MMOL/L (ref 5–15)
APTT PPP: 49.3 SECONDS (ref 24.1–35)
BASOPHILS # BLD AUTO: 0.04 10*3/MM3 (ref 0–0.2)
BASOPHILS NFR BLD AUTO: 0.5 % (ref 0–1.5)
BUN SERPL-MCNC: 71 MG/DL (ref 8–23)
BUN/CREAT SERPL: 33.2 (ref 7–25)
CALCIUM SPEC-SCNC: 10 MG/DL (ref 8.6–10.5)
CHLORIDE SERPL-SCNC: 104 MMOL/L (ref 98–107)
CO2 SERPL-SCNC: 22 MMOL/L (ref 22–29)
CREAT SERPL-MCNC: 2.14 MG/DL (ref 0.76–1.27)
DEPRECATED RDW RBC AUTO: 59.6 FL (ref 37–54)
EOSINOPHIL # BLD AUTO: 0.06 10*3/MM3 (ref 0–0.4)
EOSINOPHIL NFR BLD AUTO: 0.8 % (ref 0.3–6.2)
ERYTHROCYTE [DISTWIDTH] IN BLOOD BY AUTOMATED COUNT: 19.2 % (ref 12.3–15.4)
GFR SERPL CREATININE-BSD FRML MDRD: 31 ML/MIN/1.73
GLUCOSE BLDC GLUCOMTR-MCNC: 115 MG/DL (ref 70–130)
GLUCOSE BLDC GLUCOMTR-MCNC: 118 MG/DL (ref 70–130)
GLUCOSE BLDC GLUCOMTR-MCNC: 147 MG/DL (ref 70–130)
GLUCOSE BLDC GLUCOMTR-MCNC: 172 MG/DL (ref 70–130)
GLUCOSE SERPL-MCNC: 131 MG/DL (ref 65–99)
HCT VFR BLD AUTO: 32.2 % (ref 37.5–51)
HGB BLD-MCNC: 10.1 G/DL (ref 13–17.7)
IMM GRANULOCYTES # BLD AUTO: 0.13 10*3/MM3 (ref 0–0.05)
IMM GRANULOCYTES NFR BLD AUTO: 1.6 % (ref 0–0.5)
LYMPHOCYTES # BLD AUTO: 0.81 10*3/MM3 (ref 0.7–3.1)
LYMPHOCYTES NFR BLD AUTO: 10.2 % (ref 19.6–45.3)
MCH RBC QN AUTO: 26.5 PG (ref 26.6–33)
MCHC RBC AUTO-ENTMCNC: 31.4 G/DL (ref 31.5–35.7)
MCV RBC AUTO: 84.5 FL (ref 79–97)
MONOCYTES # BLD AUTO: 0.93 10*3/MM3 (ref 0.1–0.9)
MONOCYTES NFR BLD AUTO: 11.7 % (ref 5–12)
NEUTROPHILS NFR BLD AUTO: 6 10*3/MM3 (ref 1.7–7)
NEUTROPHILS NFR BLD AUTO: 75.2 % (ref 42.7–76)
NRBC BLD AUTO-RTO: 0 /100 WBC (ref 0–0.2)
PLATELET # BLD AUTO: 466 10*3/MM3 (ref 140–450)
PMV BLD AUTO: 10.2 FL (ref 6–12)
POTASSIUM SERPL-SCNC: 4.9 MMOL/L (ref 3.5–5.2)
RBC # BLD AUTO: 3.81 10*6/MM3 (ref 4.14–5.8)
SODIUM SERPL-SCNC: 139 MMOL/L (ref 136–145)
VANCOMYCIN PEAK SERPL-MCNC: 17.1 MCG/ML (ref 20–40)
WBC # BLD AUTO: 7.97 10*3/MM3 (ref 3.4–10.8)

## 2020-10-10 PROCEDURE — 82962 GLUCOSE BLOOD TEST: CPT

## 2020-10-10 PROCEDURE — 80202 ASSAY OF VANCOMYCIN: CPT | Performed by: FAMILY MEDICINE

## 2020-10-10 PROCEDURE — 25010000002 PIPERACILLIN SOD-TAZOBACTAM PER 1 G: Performed by: INTERNAL MEDICINE

## 2020-10-10 PROCEDURE — 85025 COMPLETE CBC W/AUTO DIFF WBC: CPT | Performed by: NURSE PRACTITIONER

## 2020-10-10 PROCEDURE — 25010000002 ENOXAPARIN PER 10 MG: Performed by: FAMILY MEDICINE

## 2020-10-10 PROCEDURE — 85730 THROMBOPLASTIN TIME PARTIAL: CPT | Performed by: NURSE PRACTITIONER

## 2020-10-10 PROCEDURE — 63710000001 INSULIN LISPRO (HUMAN) PER 5 UNITS: Performed by: FAMILY MEDICINE

## 2020-10-10 PROCEDURE — 25010000002 VANCOMYCIN PER 500 MG: Performed by: INTERNAL MEDICINE

## 2020-10-10 PROCEDURE — 80048 BASIC METABOLIC PNL TOTAL CA: CPT | Performed by: FAMILY MEDICINE

## 2020-10-10 PROCEDURE — 63710000001 INSULIN DETEMIR PER 5 UNITS: Performed by: FAMILY MEDICINE

## 2020-10-10 RX ORDER — VANCOMYCIN HYDROCHLORIDE 1 G/200ML
1000 INJECTION, SOLUTION INTRAVENOUS EVERY 24 HOURS
Status: DISCONTINUED | OUTPATIENT
Start: 2020-10-10 | End: 2020-10-11

## 2020-10-10 RX ORDER — ECHINACEA PURPUREA EXTRACT 125 MG
1 TABLET ORAL AS NEEDED
Status: DISCONTINUED | OUTPATIENT
Start: 2020-10-10 | End: 2020-10-14 | Stop reason: HOSPADM

## 2020-10-10 RX ADMIN — SODIUM CHLORIDE, PRESERVATIVE FREE 10 ML: 5 INJECTION INTRAVENOUS at 21:06

## 2020-10-10 RX ADMIN — ENOXAPARIN SODIUM 120 MG: 120 INJECTION SUBCUTANEOUS at 08:43

## 2020-10-10 RX ADMIN — CLOPIDOGREL BISULFATE 75 MG: 75 TABLET, FILM COATED ORAL at 08:44

## 2020-10-10 RX ADMIN — TAZOBACTAM SODIUM AND PIPERACILLIN SODIUM 3.38 G: 375; 3 INJECTION, SOLUTION INTRAVENOUS at 05:21

## 2020-10-10 RX ADMIN — INSULIN LISPRO 2 UNITS: 100 INJECTION, SOLUTION INTRAVENOUS; SUBCUTANEOUS at 16:44

## 2020-10-10 RX ADMIN — SALINE NASAL SPRAY 1 SPRAY: 1.5 SOLUTION NASAL at 04:12

## 2020-10-10 RX ADMIN — TAZOBACTAM SODIUM AND PIPERACILLIN SODIUM 3.38 G: 375; 3 INJECTION, SOLUTION INTRAVENOUS at 13:51

## 2020-10-10 RX ADMIN — TAZOBACTAM SODIUM AND PIPERACILLIN SODIUM 3.38 G: 375; 3 INJECTION, SOLUTION INTRAVENOUS at 21:05

## 2020-10-10 RX ADMIN — INSULIN DETEMIR 20 UNITS: 100 INJECTION, SOLUTION SUBCUTANEOUS at 08:43

## 2020-10-10 RX ADMIN — APIXABAN 5 MG: 5 TABLET, FILM COATED ORAL at 21:05

## 2020-10-10 RX ADMIN — ATORVASTATIN CALCIUM 80 MG: 40 TABLET, FILM COATED ORAL at 08:44

## 2020-10-10 RX ADMIN — PANTOPRAZOLE SODIUM 40 MG: 40 TABLET, DELAYED RELEASE ORAL at 08:43

## 2020-10-10 RX ADMIN — SODIUM CHLORIDE 100 ML/HR: 9 INJECTION, SOLUTION INTRAVENOUS at 12:30

## 2020-10-10 RX ADMIN — METOPROLOL SUCCINATE 50 MG: 50 TABLET, EXTENDED RELEASE ORAL at 08:44

## 2020-10-10 RX ADMIN — ASPIRIN 81 MG: 81 TABLET ORAL at 08:44

## 2020-10-10 RX ADMIN — VANCOMYCIN HYDROCHLORIDE 1000 MG: 1 INJECTION, SOLUTION INTRAVENOUS at 15:51

## 2020-10-10 NOTE — PROGRESS NOTES
" Pharmacy Dosing Service  Pharmacokinetics  Vancomycin Initial Evaluation  Assessment/Action/Plan:  Loading dose: 2000 mg IV (started @18:52 10/09/20)  Current Order: (not yet started) Vancomycin 1500 mg IVPB every 48 hours (tentatively starting 10/11/20 @12:00)    Current end date: final dose currently 10/13/20 (5 days ordered)  Levels: 10/10/20 vancomycin \"peak\" random = 17.1 mcg/mL (~11 hours post-dose 2000 mg)  Additional antimicrobial agent(s): Zosyn  Additional considerations: MONI, obesity, PMH of DM    SCr improving; vancomycin dosage adjusted accordingly based updated pharmacokinetic parameters: 1000 mg IV every 24 hours starting 10/10/20 @15:00. Vancomycin \"trough\" random cancelled. Pharmacy will re-evaluate 10/11/20 to determine subsequent drug level timing.  Pharmacy will continue to follow daily and adjust dose accordingly.      Subjective:  Manuel Clark is a 65 y.o. male on Vancomycin for the treatment of SSTI, day 2 of 5.     AUC Model Data:  Model: adult, obese  Loading dose: 2000 mg  Regimen: 1000 mg every 24 hours  Exposure target: AUC24 (range)400-600 mg/L.hr  AUC24,ss: 559 mg/L.hr  PAUC*: 86 %  Ctrough,ss: 18.6 mg/L  Pconc*: 43 %  Tox.: 15 %           Past Medical / Surgical / Social History reviewed.     Objective:  65 y.o. male Ht: 172.7 cm (67.99\"); Wt: 120 kg (264 lb 8.8 oz) Body mass index is 40.23 kg/m².  Estimated Creatinine Clearance: 43.9 mL/min (A) (by C-G formula based on SCr of 2.14 mg/dL (H)).    BUN   Date Value Ref Range Status   10/10/2020 71 (H) 8 - 23 mg/dL Final   10/09/2020 73 (H) 8 - 23 mg/dL Final   05/03/2020 22 8 - 23 mg/dL Final      Creatinine   Date Value Ref Range Status   10/10/2020 2.14 (H) 0.76 - 1.27 mg/dL Final   10/09/2020 2.91 (H) 0.76 - 1.27 mg/dL Final   05/03/2020 1.02 0.76 - 1.27 mg/dL Final      Lab Results   Component Value Date    WBC 7.97 10/10/2020    WBC 9.06 10/09/2020    WBC 8.30 05/03/2020        Lab Results   Component Value Date    LALITA" 17.10 (L) 10/10/2020         Culture Results:  Microbiology Results (last 10 days)       Procedure Component Value - Date/Time    Wound Culture - Wound, Leg, Left [[id number]] Collected: 10/09/20 1609    Lab Status: Preliminary result Specimen: Wound from Leg, Left Updated: 10/10/20 0156     Gram Stain No WBCs or organisms seen    COVID PRE-OP / PRE-PROCEDURE SCREENING ORDER (NO ISOLATION) - Swab, Nasal Cavity [[id number]]  (Normal) Collected: 10/09/20 1424    Lab Status: Final result Specimen: Swab from Nasal Cavity Updated: 10/09/20 1518    Narrative:      The following orders were created for panel order COVID PRE-OP / PRE-PROCEDURE SCREENING ORDER (NO ISOLATION) - Swab, Nasal Cavity.  Procedure                               Abnormality         Status                     ---------                               -----------         ------                     COVID-19, ABBOTT IN-HOUS...[[id number]]  Normal              Final result                 Please view results for these tests on the individual orders.    COVID-19, ABBOTT IN-HOUSE,NP Swab (NO TRANSPORT MEDIA) 2 HR TAT - Swab, Nasal Cavity [[id number]]  (Normal) Collected: 10/09/20 1424    Lab Status: Final result Specimen: Swab from Nasal Cavity Updated: 10/09/20 1518     COVID19 Not Detected    Narrative:      Fact sheet for providers: https://www.fda.gov/media/256233/download     Fact sheet for patients: https://www.fda.gov/media/241504/download    Blood Culture With RONALDO - Blood, Arm, Left [[id number]] Collected: 10/09/20 1422    Lab Status: Preliminary result Specimen: Blood from Arm, Left Updated: 10/10/20 0245     Blood Culture No growth at less than 24 hours            Santosh Meredith, [place]  10/10/20 12:23 CDT

## 2020-10-10 NOTE — PROGRESS NOTES
"    [facility]  INPATIENT PROGRESS NOTE    Patient Name: Manuel Clark  Date of Admission: 10/9/2020  Today's Date: 10/10/20  Length of Stay: 1  Primary Care Physician: Kathy Granger APRN    Subjective   Chief Complaint: Weakness.   HPI   His main complaint is left lower extremity weakness.  This is a chronic issue for him.  He has had this worked up extensively as an outpatient by Dr. Flowers.  He told me several times that Dr. Flowers's nurse practitioner (Jj Barlow) is a \"brown noser.\"  They have not offered surgical intervention because his primary care physician and his cardiologist stated that he would not do well with the procedure.  The risk outweighed the benefits.    He sees Dr. Howell in [place], [place] for wound care.  He says that he is a \"fucking idiot.\"  He really likes Dr. Parks, a podiatrist in [place], because she saved his toe.  He \"worships the ground she walks on.\"  He has problems with chronic stasis dermatitis of the bilateral lower extremities and lymphedema.  He states that he does not tolerate Unna boots very well.  They \"do not fucking work\" and he does not want me to order any.  His legs are currently loosely wrapped with Kerlix.    I told him our main concerns at present are his renal function, his left lower extremity DVT, and his cellulitis.  He informed me that he is supposed to see infectious disease as an outpatient on 10/21, but wants us to consult them while he is here in the hospital.    He also wants to discontinue his cardiac telemetry.    Review of Systems   All pertinent negatives and positives are as above. All other systems have been reviewed and are negative unless otherwise stated.     Objective    Temp:  [97.7 °F (36.5 °C)-98.4 °F (36.9 °C)] 98.3 °F (36.8 °C)  Heart Rate:  [] 97  Resp:  [16-20] 20  BP: ()/(53-70) 100/70  Physical Exam  Constitutional:       Appearance: He is well-developed.     "  Comments: Up in his [place].  No family present.  He called his wife on the phone while I was in the room.  Seen and discussed with his nurse, Garrett   HENT:      Head: Normocephalic and atraumatic.   Eyes:      Conjunctiva/sclera: Conjunctivae normal.      Pupils: Pupils are equal, round, and reactive to light.   Neck:      Musculoskeletal: Neck supple.      Vascular: No JVD.   Cardiovascular:      Rate and Rhythm: Normal rate and regular rhythm.      Heart sounds: Normal heart sounds. No murmur. No friction rub. No gallop.    Pulmonary:      Effort: Pulmonary effort is normal. No respiratory distress.      Breath sounds: Normal breath sounds. No wheezing or rales.   Chest:      Chest wall: No tenderness.   Abdominal:      General: Bowel sounds are normal. There is no distension.      Palpations: Abdomen is soft.      Tenderness: There is no abdominal tenderness. There is no guarding or rebound.   Musculoskeletal: Normal range of motion.         General: Swelling (L>R) present. No tenderness or deformity.   Skin:     General: Skin is warm and dry.      Findings: Erythema present. No rash.      Comments: Loose Kerlix dressings to the bilateral lower extremities.  Cellulitis evident, but more pronounced on the left lower extremity.  Chronic stasis changes and hemosiderin.  Peau d'orange skin.   Neurological:      Mental Status: He is alert and oriented to person, place, and time.      Cranial Nerves: No cranial nerve deficit.      Motor: No abnormal muscle tone.      Deep Tendon Reflexes: Reflexes normal.   Psychiatric:      Comments: He is conversant.  He uses foul language frequently.       Results Review:  I have reviewed the labs, radiology results, and diagnostic studies.    Laboratory Data:   Results from last 7 days   Lab Units 10/10/20  0556 10/09/20  1422   WBC 10*3/mm3 7.97 9.06   HEMOGLOBIN g/dL 10.1* 10.0*   HEMATOCRIT % 32.2* 31.4*   PLATELETS 10*3/mm3 466* 455*     Results from last 7 days   Lab  Units 10/10/20  0556 10/09/20  1609   SODIUM mmol/L 139 135*   POTASSIUM mmol/L 4.9 5.3*   CHLORIDE mmol/L 104 102   CO2 mmol/L 22.0 21.0*   BUN mg/dL 71* 73*   CREATININE mg/dL 2.14* 2.91*   CALCIUM mg/dL 10.0 9.6   BILIRUBIN mg/dL  --  0.3   ALK PHOS U/L  --  22*   ALT (SGPT) U/L  --  17   AST (SGOT) U/L  --  21   GLUCOSE mg/dL 131* 104*     Culture Data:   Blood Culture   Date Value Ref Range Status   10/09/2020 No growth at less than 24 hours  Preliminary     Radiology Data:   Imaging Results (Last 24 Hours)     Procedure Component Value Units Date/Time    US Venous Doppler Lower Extremity Bilateral (duplex) [[id number]] Resulted: 10/09/20 1657      Bilateral LEV: prelim. (+) DVT: partial, non occlusive thrombus noted in small segment of Lt prox FV. Very limited exam due to limb size and tissue density. Limited visualization of all vessels. Not visualized: Rt dist FV, Lt ATV and B/L Rubina Veins. Informed patients nurseTalon, of preliminary results. Final report pending.      Updated: 10/09/20 1702    CT Head Without Contrast [[id number]] Collected: 10/09/20 1505     Updated: 10/09/20 1524    Narrative:      EXAMINATION:  CT HEAD WO CONTRAST-  10/9/2020 2:47 PM CDT     HISTORY: Mental status change, unknown cause. The patient had difficulty  holding still for the examination.     TECHNIQUE: Multiple axial images were obtained through the brain without  contrast infusion. Multiplanar images were reconstructed.     DLP: 1674 mGy-cm. Automated dosage control was utilized.     COMPARISON: No comparison study.     FINDINGS: There is mild motion artifact. There are no hemorrhage, edema  or mass effect. 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 The ventricular system is  nondilated. The visualized paranasal sinuses are clear. The mastoid air  cells are clear. No calvarial fracture is seen.       Impression:      No hemorrhage, edema or mass effect. No acute findings.     The full report of this exam was immediately signed and  available to the  emergency room. The patient is currently in the emergency room.     This report was finalized on 10/09/2020 15:07 by Dr. Salinas Morgan MD.    XR Tibia Fibula 2 View Right [[id number]] Collected: 10/09/20 1453     Updated: 10/09/20 1503    Narrative:         Exam:   XR TIBIA FIBULA 2 VW RIGHT-       Date:  10/9/2020      History:  Male, age  65 years;redness, swelling     COMPARISON:  None.     Findings :     There is no acute displaced fracture. No dislocation. No suspicious  lytic or blastic lesion. No radiopaque foreign body.     Vascular calcifications.          Impression:      Impression:     No acute osseous pathology.     This report was finalized on 10/09/2020 14:54 by Dr. Enedina Parmar MD.    XR Tibia Fibula 2 View Left [[id number]] Collected: 10/09/20 1453     Updated: 10/09/20 1503    Narrative:      EXAMINATION:  XR TIBIA FIBULA 2 VW LEFT-  10/9/2020 2:29 PM CDT     HISTORY: Redness and swelling.     COMPARISON: No comparison study.     TECHNIQUE: 2 views were obtained.     FINDINGS:  The left tibia and fibula are intact. Vascular calcification  is noted. There is degenerative osteoarthritis of the left knee. There  is subcutaneous edema throughout the visualized lower leg.       Impression:      1. No acute bony abnormality of the tibia or fibula.  2. Degenerative osteoarthritis left knee.  3. Diffuse subcutaneous edema within the soft tissues. Vascular  calcification.     This report was finalized on 10/09/2020 14:54 by Dr. Salinas Morgan MD.    XR Foot 3+ View Right [[id number]] Collected: 10/09/20 1454     Updated: 10/09/20 1502    Narrative:         Exam:   XR FOOT 3+ VW RIGHT-       Date:  10/9/2020      History:  Male, age  65 years;redness, swelling     COMPARISON:  None.     Findings :  Mild nonspecific soft tissue swelling. 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     The Lisfranc joint spacing is within normal limits. No acute finding at  the base of fifth metatarsal  bone. Degenerative changes in the foot  distally.        There is no acute displaced fracture. No dislocation. No suspicious  lytic or blastic lesion. No radiopaque foreign body.          Impression:      Impression:     Soft tissue swelling and prior radiation of the first digit, without  evidence of acute osteomyelitis..     This report was finalized on 10/09/2020 14:55 by Dr. Enedina Parmar MD.          I have reviewed the patient's current medications.     Assessment/Plan     Active Hospital Problems    Diagnosis   • **Cellulitis of left leg   • Acute deep vein thrombosis (DVT) of femoral vein of left lower extremity (CMS/[facility])   • Acute renal failure (ARF) (CMS/[facility])   • Type 2 diabetes mellitus with hyperglycemia, with long-term current use of insulin (CMS/[facility])   • Diabetic neuropathy (CMS/[facility])   • Essential hypertension   • Coronary artery disease with history of PCI and stent placement   • Chronic bilateral low back pain without sciatica   • BMI 40.0-44.9, adult (CMS/[facility])   • Tobacco abuse     Plan:  The patient was admitted on 10/9 by Dr. Guerra.  He presented to the emergency department primarily with complaints of swelling, discomfort, and redness of the left lower extremity.  He had recent outpatient treatment for cellulitis with clindamycin and ciprofloxacin.  He claims a history of an MRSA infection of the right toe in the past.  He was found to have a left femoral DVT on duplex in the ER.  He was also found to be in acute renal failure.    He is currently being anticoagulated with therapeutic Lovenox.  Plan to transition him to oral Eliquis.    He is being treated with vancomycin and Zosyn for his left lower extremity cellulitis.  Some of his skin changes may be related to the DVT, but he has chronic stasis dermatitis and recurrent cellulitis followed by wound care in [place].  I would like to continue to monitor him closely and will continue antibiotic therapy for now.  Infectious disease was  consulted by Dr. Guerra.     His serum creatinine was 1.02 in May 2020.  GFR 74 at that time.  His serum creatinine is improving with holding central nephrotoxins and providing fluids.  Agree with holding Dyazide, valsartan, and spironolactone.  His blood pressure has actually been a bit soft even off of his medication.    He is chronically on aspirin and Plavix.  He has a history of cardiac stenting.  Given the fact that he is starting full dose anticoagulation, we can likely discontinue his aspirin for now and continue with Plavix.    Hemoglobin A1c 5.8 at present.  Continue basal insulin.  Janumet to be on hold secondary to his renal failure.  Jardiance not on formulary.      PT/OT.    Discharge Planning: I expect the patient to be discharged to home in 2-3 days.    Electronically signed by Cornelius Carrasco DO, 10/10/20, 11:28 CDT.

## 2020-10-10 NOTE — PROGRESS NOTES
"Pharmacy Dosing Service  Pharmacokinetics  Vancomycin Initial Evaluation  Assessment/Action/Plan:  Loading dose: 2000 mg IV over 3 hours (started @18:52 10/09/20)  Current Order: Vancomycin 1500 mg IVPB every 48 hours (tentatively starting 10/11/20 @12;00)  Current end date: final dose currently 10/13/20 (5 days ordered)  Levels: \"Peak\" ordered with AM labs 10/10/20; random ordered with AM labs 10/11/20  Additional antimicrobial agent(s): Zosyn  Additional considerations: MONI, obesity, PMH of DM  Vancomycin dosage initiated based on population pharmacokinetic parameters. Pharmacy will continue to follow daily and adjust dose accordingly.     Subjective:  Manuel Clark is a 65 y.o. male with a Vancomycin \"Pharmacy to Dose\" consult for the treatment of SSTI, day 1 of 5.    AUC Model Data:  Model: adult, obese  Loading dose: 2000 mg   Regimen: 1500 mg every 48 hours  Exposure target: AUC24 (range)400-600 mg/L.hr  AUC24,ss: 574 mg/L.hr  PAUC*: 79 %  Ctrough,ss: 14.6 mg/L  Pconc*: 34 %  Tox.: 10 %    Objective:  Ht: 172.7 cm (67.99\"); Wt: 120 kg (264 lb)  Estimated Creatinine Clearance: 32.3 mL/min (A) (by C-G formula based on SCr of 2.91 mg/dL (H)).   Creatinine   Date Value Ref Range Status   10/09/2020 2.91 (H) 0.76 - 1.27 mg/dL Final   05/03/2020 1.02 0.76 - 1.27 mg/dL Final      Lab Results   Component Value Date    WBC 9.06 10/09/2020    WBC 8.30 05/03/2020      Baseline culture results:  Microbiology Results (last 10 days)       Procedure Component Value - Date/Time    COVID PRE-OP / PRE-PROCEDURE SCREENING ORDER (NO ISOLATION) - Swab, Nasal Cavity [[id number]]  (Normal) Collected: 10/09/20 1424    Lab Status: Final result Specimen: Swab from Nasal Cavity Updated: 10/09/20 4304    Narrative:      The following orders were created for panel order COVID PRE-OP / PRE-PROCEDURE SCREENING ORDER (NO ISOLATION) - Swab, Nasal Cavity.  Procedure                               Abnormality         Status                   "   ---------                               -----------         ------                     COVID-19, ABBOTT IN-HOUS...[[id number]]  Normal              Final result                 Please view results for these tests on the individual orders.    COVID-19, ABBOTT IN-HOUSE,NP Swab (NO TRANSPORT MEDIA) 2 HR TAT - Swab, Nasal Cavity [[id number]]  (Normal) Collected: 10/09/20 1424    Lab Status: Final result Specimen: Swab from Nasal Cavity Updated: 10/09/20 1518     COVID19 Not Detected    Narrative:      Fact sheet for providers: https://www.fda.gov/media/779872/download     Fact sheet for patients: https://www.fda.gov/media/046262/download            Santosh Meredith PharmD  10/09/20 20:11 CDT

## 2020-10-10 NOTE — PLAN OF CARE
Problem: Adult Inpatient Plan of Care  Goal: Plan of Care Review  Outcome: Ongoing, Progressing  Flowsheets (Taken 10/10/2020 0455)  Progress: no change  Plan of Care Reviewed With: patient  Outcome Summary: pt admitted to  at beginnning of shift. VSS. no c/o pain. dsg on bilateral legs d/t cellulitis. left leg wheeping moderately. wound under scrotum on right side, mepelex on. leg dsg's changed this shift. wound care consulted. pt frequently trying to get out of bed, very weak and unsteady. pt was found [facility] out of bed multiple times. bed alarm has been set. pt is now up in recliner and saying he is going home today. pt stated he cannot breathe laying flat, applied 1L NC d/t o2 sat of 88 on room air. MD ordered nasal spray per pt request.  S/ST  on tele. IVF 100ml/hr. safety maintained. continue to monitor.

## 2020-10-10 NOTE — PROGRESS NOTES
"Pharmacy Dosing Service  Anticoagulant  Enoxaparin    Assessment/Action/Plan:  Lovenox 1 mg/kg SQ every 24 hours ordered for acute DVT on patient with MONI. Dose adjusted to 1 mg/kg SQ every 12 hours for CrCl > 30 ml/min. Pharmacy will continue to monitor renal function and adjust dose accordingly.       Subjective:  Manuel Clark is a 65 y.o. male on Enoxaparin 1 mg/kg SQ every 12 hours for indication of DVT.  Objective:  [Ht: 172.7 cm (67.99\"); Wt: 120 kg (264 lb); BMI: Body mass index is 40.15 kg/m².]  Estimated Creatinine Clearance: 32.3 mL/min (A) (by C-G formula based on SCr of 2.91 mg/dL (H)).   Lab Results   Component Value Date    INR 1.11 (H) 10/09/2020    PROTIME 13.9 10/09/2020      Lab Results   Component Value Date    HGB 10.0 (L) 10/09/2020      Lab Results   Component Value Date     (H) 10/09/2020     Leandro March, PharmD  10/09/20 21:36 CDT     "

## 2020-10-10 NOTE — PLAN OF CARE
Nutrition: Assessment completed. Pt reports decreased appetite due to sitting more. Pt has lost 30 pounds in 6 months. Pt eating 2 meals daily and only drinking crystal light. Wounds noted. No PO intake doc at this time. RDN will continue to follow pt and PO intake. Thanks.

## 2020-10-10 NOTE — H&P
[facility] Medicine Services  HISTORY AND PHYSICAL    Date of Admission: 10/9/2020  Primary Care Physician: Kathy Granger APRN    Subjective     Chief Complaint: left leg redness and pain    History of Present Illness  65 year old male with PMH of DM ID, CAD, HTN, neuropathy,  Left sided weqakness, chronic leg edema that presents with complaints of worsening redness, swelling and pain over left leg. He has had cellulitis for several months, under wound care management and has been prescribed clindamycin and cipro recently, but continues to worsen. He has had MRSA skin infection in right toe in the past.    Doppler studies in ER was preliminarily reported as femoral vein thrombosis.     Review of Systems   Otherwise complete ROS reviewed and negative except as mentioned in the HPI.    Past Medical History:   Past Medical History:   Diagnosis Date   • Arthritis    • Diabetes mellitus (CMS/HCC)    • Diabetic neuropathy (CMS/HCC)    • Hypertension    • Nerve damage     L forearm/hand secondary to compound fx     Past Surgical History:  Past Surgical History:   Procedure Laterality Date   • CARDIAC SURGERY      5 stents   • CARPAL TUNNEL RELEASE     • KNEE SURGERY Right    • OTHER SURGICAL HISTORY      compound fracture of the L arm     Social History:  reports that he has been smoking. He has been smoking about 0.50 packs per day. He has never used smokeless tobacco. He reports current alcohol use. He reports that he does not use drugs.    Family History: family history includes Arthritis in his father; Cancer in his mother; Diabetes in his mother; Heart disease in his father and mother; Hypertension in his father and mother; Stroke in his mother.       Allergies:  Allergies   Allergen Reactions   • Iodine Unknown - High Severity   • Shellfish-Derived Products Anaphylaxis   • Sulfa Antibiotics Hives     Medications:  Prior to Admission medications    Medication Sig Start Date  End Date Taking? Authorizing Provider   aspirin (ECOTRIN LOW STRENGTH) 81 MG EC tablet Take 81 mg by mouth.    Kenn Spears MD   atorvastatin (LIPITOR) 80 MG tablet Take 80 mg by mouth.    Kenn Spears MD   cefdinir (OMNICEF) 300 MG capsule  5/7/20   Kenn Spears MD   clopidogrel (PLAVIX) 75 MG tablet Take 75 mg by mouth.    Kenn Spears MD   Empagliflozin (Jardiance) 10 MG tablet 10 mg Daily. 3/18/20   Kenn Spears MD   fenofibric acid (TRILIPIX) 135 MG capsule delayed-release delayed release capsule Take 135 mg by mouth Daily. 11/25/19   Kenn Spears MD   Insulin Glargine (LANTUS SOLOSTAR) 100 UNIT/ML injection pen Inject 50 Units under the skin into the appropriate area as directed 2 (Two) Times a Day.    Kenn Spears MD   metoprolol succinate XL (TOPROL-XL) 50 MG 24 hr tablet Take 50 mg by mouth.    Kenn Spears MD   Multiple Vitamins-Minerals (MULTIVITAMIN WITH MINERALS) tablet tablet Take 1 tablet by mouth Daily.    Kenn Spears MD   omega-3 acid ethyl esters (LOVAZA) 1 g capsule Take 1 g by mouth 4 (Four) Times a Day. 12/16/19   Kenn Spears MD   omeprazole (priLOSEC) 20 MG capsule Take 20 mg by mouth Daily.    Kenn Spears MD   ONE TOUCH ULTRA TEST test strip CHECK BLOOD SUGAR 3 TIMES A DAY *E11.9* 11/25/19   Kenn Spears MD   pentoxifylline (TRENtal) 400 MG CR tablet Take 400 mg by mouth 3 (Three) Times a Day. 4/19/20   Kenn Spears MD   potassium chloride (K-DUR,KLOR-CON) 20 MEQ CR tablet Take 40 mEq by mouth.    Kenn Spears MD   pregabalin (LYRICA) 75 MG capsule Take 75 mg by mouth.    Kenn Spears MD   REGRANEX 0.01 % gel APPLY THIN LAYER TO AFFECTED AREA ONCE DAILY EVERY 12 HOURS ON, 12 HOURS OFF 4/9/20   Kenn Spears MD   SITagliptin-MetFORMIN HCl ER (JANUMET XR)  MG tablet Take 1 tablet by mouth.    Kenn Spears MD   spironolactone (ALDACTONE)  "25 MG tablet Take 50 mg by mouth.    Kenn Spears MD   tiZANidine (ZANAFLEX) 4 MG tablet Take 4 mg by mouth 3 (Three) Times a Day As Needed. 12/16/19   Kenn Spears MD   triamterene-hydrochlorothiazide (DYAZIDE) 37.5-25 MG per capsule Take 1 capsule by mouth.    Kenn Spears MD   valsartan (DIOVAN) 80 MG tablet Take 80 mg by mouth.    Kenn Spears MD   clotrimazole-betamethasone (LOTRISONE) 1-0.05 % cream  5/4/20 10/9/20  Kenn Spears MD     Objective     Vital Signs: /64 (BP Location: Left arm, Patient Position: Sitting)   Pulse 86   Temp 97.7 °F (36.5 °C) (Oral)   Resp 16   Ht 172.7 cm (67.99\")   Wt 120 kg (264 lb)   SpO2 94%   BMI 40.15 kg/m²   Physical Exam  Constitutional:       General: He is not in acute distress.     Appearance: He is obese. He is not toxic-appearing or diaphoretic.      Comments: Skin dry, mucosa pasty   HENT:      Head: Normocephalic and atraumatic.      Right Ear: External ear normal.      Left Ear: External ear normal.      Nose: Nose normal.      Mouth/Throat:      Mouth: Mucous membranes are dry.      Pharynx: No oropharyngeal exudate.   Eyes:      General:         Right eye: No discharge.         Left eye: No discharge.      Extraocular Movements: Extraocular movements intact.      Pupils: Pupils are equal, round, and reactive to light.   Neck:      Musculoskeletal: Normal range of motion and neck supple. No neck rigidity or muscular tenderness.   Cardiovascular:      Rate and Rhythm: Normal rate and regular rhythm.      Pulses: Normal pulses.   Pulmonary:      Effort: Pulmonary effort is normal. No respiratory distress.      Breath sounds: Normal breath sounds. No stridor. No wheezing or rhonchi.   Abdominal:      General: Abdomen is flat. There is no distension.      Palpations: Abdomen is soft. There is no mass.      Tenderness: There is no abdominal tenderness.      Hernia: No hernia is present.   Musculoskeletal: Normal " range of motion.      Right lower leg: Edema present.      Left lower leg: Edema present.   Skin:     General: Skin is dry.      Capillary Refill: Capillary refill takes less than 2 seconds.      Coloration: Skin is not jaundiced.      Comments: Left leg with erythema from foot to upper leg, streaking area going up into thigh. Right leg shows also chronic erythema. There are some open areas in leg with minimal oozing.   Neurological:      Mental Status: He is alert. Mental status is at baseline.      Cranial Nerves: No cranial nerve deficit.      Coordination: Coordination normal.   Psychiatric:         Mood and Affect: Mood normal.         Behavior: Behavior normal.     Results Reviewed:  Lab Results (last 24 hours)     Procedure Component Value Units Date/Time    aPTT [[id number]]  (Abnormal) Collected: 10/09/20 1901    Specimen: Blood Updated: 10/09/20 1917     PTT 35.2 seconds     Comprehensive Metabolic Panel [[id number]]  (Abnormal) Collected: 10/09/20 1609    Specimen: Blood Updated: 10/09/20 1634     Glucose 104 mg/dL      BUN 73 mg/dL      Creatinine 2.91 mg/dL      Sodium 135 mmol/L      Potassium 5.3 mmol/L      Chloride 102 mmol/L      CO2 21.0 mmol/L      Calcium 9.6 mg/dL      Total Protein 7.3 g/dL      Albumin 3.40 g/dL      ALT (SGPT) 17 U/L      AST (SGOT) 21 U/L      Alkaline Phosphatase 22 U/L      Total Bilirubin 0.3 mg/dL      eGFR Non African Amer 22 mL/min/1.73      Globulin 3.9 gm/dL      A/G Ratio 0.9 g/dL      BUN/Creatinine Ratio 25.1     Anion Gap 12.0 mmol/L     Narrative:      GFR Normal >60  Chronic Kidney Disease <60  Kidney Failure <15      Wound Culture - Wound, Leg, Left [[id number]] Collected: 10/09/20 1609    Specimen: Wound from Leg, Left Updated: 10/09/20 1614    COVID PRE-OP / PRE-PROCEDURE SCREENING ORDER (NO ISOLATION) - Swab, Nasal Cavity [[id number]]  (Normal) Collected: 10/09/20 1424    Specimen: Swab from Nasal Cavity Updated: 10/09/20 1518    Narrative:      The following  orders were created for panel order COVID PRE-OP / PRE-PROCEDURE SCREENING ORDER (NO ISOLATION) - Swab, Nasal Cavity.  Procedure                               Abnormality         Status                     ---------                               -----------         ------                     COVID-19, ABBOTT IN-HOUS...[[id number]]  Normal              Final result                 Please view results for these tests on the individual orders.    COVID-19, ABBOTT IN-HOUSE,NP Swab (NO TRANSPORT MEDIA) 2 HR TAT - Swab, Nasal Cavity [[id number]]  (Normal) Collected: 10/09/20 1424    Specimen: Swab from Nasal Cavity Updated: 10/09/20 1518     COVID19 Not Detected    Narrative:      Fact sheet for providers: https://www.fda.gov/media/513623/download     Fact sheet for patients: https://www.fda.gov/media/058712/download    Lactic Acid, Plasma [[id number]]  (Normal) Collected: 10/09/20 1422    Specimen: Blood Updated: 10/09/20 1449     Lactate 1.4 mmol/L     Blood Culture With RONALDO - Blood, Arm, Left [[id number]] Collected: 10/09/20 1422    Specimen: Blood from Arm, Left Updated: 10/09/20 1442    Protime-INR [[id number]]  (Abnormal) Collected: 10/09/20 1422    Specimen: Blood Updated: 10/09/20 1441     Protime 13.9 Seconds      INR 1.11    aPTT [[id number]]  (Normal) Collected: 10/09/20 1422    Specimen: Blood Updated: 10/09/20 1441     PTT 32.2 seconds     CBC & Differential [[id number]]  (Abnormal) Collected: 10/09/20 1422    Specimen: Blood Updated: 10/09/20 1432    Narrative:      The following orders were created for panel order CBC & Differential.  Procedure                               Abnormality         Status                     ---------                               -----------         ------                     CBC Auto Differential[[id number]]        Abnormal            Final result                 Please view results for these tests on the individual orders.    CBC Auto Differential [[id number]]  (Abnormal) Collected:  10/09/20 1422    Specimen: Blood Updated: 10/09/20 1432     WBC 9.06 10*3/mm3      RBC 3.68 10*6/mm3      Hemoglobin 10.0 g/dL      Hematocrit 31.4 %      MCV 85.3 fL      MCH 27.2 pg      MCHC 31.8 g/dL      RDW 19.4 %      RDW-SD 60.7 fl      MPV 10.6 fL      Platelets 455 10*3/mm3      Neutrophil % 76.7 %      Lymphocyte % 10.0 %      Monocyte % 10.0 %      Eosinophil % 1.0 %      Basophil % 0.3 %      Immature Grans % 2.0 %      Neutrophils, Absolute 6.94 10*3/mm3      Lymphocytes, Absolute 0.91 10*3/mm3      Monocytes, Absolute 0.91 10*3/mm3      Eosinophils, Absolute 0.09 10*3/mm3      Basophils, Absolute 0.03 10*3/mm3      Immature Grans, Absolute 0.18 10*3/mm3      nRBC 0.0 /100 WBC         Imaging Results (Last 24 Hours)     Procedure Component Value Units Date/Time    US Venous Doppler Lower Extremity Bilateral (duplex) [[id number]] Resulted: 10/09/20 1657     Updated: 10/09/20 1702    CT Head Without Contrast [[id number]] Collected: 10/09/20 1505     Updated: 10/09/20 1524    Narrative:      EXAMINATION:  CT HEAD WO CONTRAST-  10/9/2020 2:47 PM CDT     HISTORY: Mental status change, unknown cause. The patient had difficulty  holding still for the examination.     TECHNIQUE: Multiple axial images were obtained through the brain without  contrast infusion. Multiplanar images were reconstructed.     DLP: 1674 mGy-cm. Automated dosage control was utilized.     COMPARISON: No comparison study.     FINDINGS: There is mild motion artifact. There are no hemorrhage, edema  or mass effect. 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 The ventricular system is  nondilated. The visualized paranasal sinuses are clear. The mastoid air  cells are clear. No calvarial fracture is seen.       Impression:      No hemorrhage, edema or mass effect. No acute findings.     The full report of this exam was immediately signed and available to the  emergency room. The patient is currently in the emergency room.     This report was finalized on  10/09/2020 15:07 by Dr. Salinas Morgan MD.    XR Tibia Fibula 2 View Right [[id number]] Collected: 10/09/20 1453     Updated: 10/09/20 1503    Narrative:         Exam:   XR TIBIA FIBULA 2 VW RIGHT-       Date:  10/9/2020      History:  Male, age  65 years;redness, swelling     COMPARISON:  None.     Findings :     There is no acute displaced fracture. No dislocation. No suspicious  lytic or blastic lesion. No radiopaque foreign body.     Vascular calcifications.          Impression:      Impression:     No acute osseous pathology.     This report was finalized on 10/09/2020 14:54 by Dr. Enedina Parmar MD.    XR Tibia Fibula 2 View Left [[id number]] Collected: 10/09/20 1453     Updated: 10/09/20 1503    Narrative:      EXAMINATION:  XR TIBIA FIBULA 2 VW LEFT-  10/9/2020 2:29 PM CDT     HISTORY: Redness and swelling.     COMPARISON: No comparison study.     TECHNIQUE: 2 views were obtained.     FINDINGS:  The left tibia and fibula are intact. Vascular calcification  is noted. There is degenerative osteoarthritis of the left knee. There  is subcutaneous edema throughout the visualized lower leg.       Impression:      1. No acute bony abnormality of the tibia or fibula.  2. Degenerative osteoarthritis left knee.  3. Diffuse subcutaneous edema within the soft tissues. Vascular  calcification.     This report was finalized on 10/09/2020 14:54 by Dr. Salinas Morgan MD.    XR Foot 3+ View Right [[id number]] Collected: 10/09/20 1454     Updated: 10/09/20 1502    Narrative:         Exam:   XR FOOT 3+ VW RIGHT-       Date:  10/9/2020      History:  Male, age  65 years;redness, swelling     COMPARISON:  None.     Findings :  Mild nonspecific soft tissue swelling. 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     The Lisfranc joint spacing is within normal limits. No acute finding at  the base of fifth metatarsal bone. Degenerative changes in the foot  distally.        There is no acute displaced fracture. No dislocation. No  suspicious  lytic or blastic lesion. No radiopaque foreign body.          Impression:      Impression:     Soft tissue swelling and prior radiation of the first digit, without  evidence of acute osteomyelitis..     This report was finalized on 10/09/2020 14:55 by Dr. Enedina Parmar MD.        I have personally reviewed and interpreted the radiology studies and ECG obtained at time of admission.     Assessment / Plan     Assessment:   Active Hospital Problems    Diagnosis   • MONI (acute kidney injury) (CMS/[facility])   • Diabetes mellitus (CMS/[facility])   • Diabetic neuropathy (CMS/[facility])   • Cellulitis of left leg   • Acute deep vein thrombosis (DVT) of femoral vein of left lower extremity (CMS/[facility])   • Left-sided weakness   • Chronic bilateral low back pain without sciatica   • BMI 40.0-44.9, adult (CMS/[facility])     Plan:   Admit to telemetry, vitals every 4 hours  Cardiac/diabetic diet    IVF  cc/hour, follow BMP  Renal US  Hold ACE/ARB and HCTZ. Follow potassium level closely    Continue Zosyn/Vancomycin  ID consult due to MRSA skin infection history and patient request  Medications reconciled    DVT > Lovenox 1 mg/kg daily    Levemir daily, lower dose than home listed  A1C check  Low dose humalog sliding scale    Code Status: Full     I discussed the patient's findings and my recommendations with the patient    Estimated length of stay over 2 midnights    Patient seen and examined by me on 10/09/2020 at 2005.    Electronically signed by Juliocesar Guerra MD, 10/09/20, 21:30 CDT.

## 2020-10-10 NOTE — PLAN OF CARE
Goal Outcome Evaluation:  Plan of Care Reviewed With: patient  Progress: no change  Outcome Summary: No c/o pain today. Pt requested to sit in his wheelchair for most of shift, but pt currently in bed with legs elevated above heart per ID recommendation. BLE constantly draining. BLE open to air currently. Pt very weak, PT/OT consult. IV ABX and IVF cont. x1 large BM this shift. Refusing tele. Safety maintained, cont to monitor.

## 2020-10-10 NOTE — CONSULTS
"INFECTIOUS DISEASES CONSULT NOTE    Patient:  Manuel Clark 65 y.o. male  ROOM # 408/1  YOB: 1955  MRN: [id number]  [facility]:  [id number]  Admit date: 10/9/2020   Admitting Physician: Cornelius Carrasco DO  Primary Care Physician: Kathy Granger APRN  REFERRING PROVIDER: Cornelius Clemons MD      REASON FOR CONSULTATION :left leg cellulits  MRSA skin infection history and patient request      HISTORY OF PRESENT ILLNESS: The patient is a morbidly obese 65-year-old gentleman who presented to the emergency department yesterday with complaints of lower extremity erythema, swelling and drainage.    He reports is been an ongoing problem and he is followed by his nurse practitioner as well as wound care at [facility].    He apparently has an outpatient appointment with Dr. Rao and requested infectious disease to see him while he was in the hospital.    He denies having any fevers or chills.  He has never had his toenails treated for onychomycosis.  He does not tolerate compression.  He is apparently been treated with antibiotics off and on and states that his \"infection\" will not go away      Past Medical History:   Diagnosis Date   • Arthritis    • Diabetes mellitus (CMS/HCC)    • Diabetic neuropathy (CMS/HCC)    • Hypertension    • Nerve damage     L forearm/hand secondary to compound fx     Past Surgical History:   Procedure Laterality Date   • CARDIAC SURGERY      5 stents   • CARPAL TUNNEL RELEASE     • KNEE SURGERY Right    • OTHER SURGICAL HISTORY      compound fracture of the L arm     Family History   Problem Relation Age of Onset   • Stroke Mother    • Cancer Mother         breast cancer x2   • Diabetes Mother    • Heart disease Mother    • Hypertension Mother    • Heart disease Father    • Arthritis Father    • Hypertension Father      Social History     Socioeconomic History   • Marital status:      Spouse name: Not on file   • Number of children: Not on file   • " Years of education: Not on file   • Highest education level: Not on file   Tobacco Use   • Smoking status: Current Every Day Smoker     Packs/day: 0.50   • Smokeless tobacco: Never Used   Substance and Sexual Activity   • Alcohol use: Yes   • Drug use: No   • Sexual activity: Defer           Current Meds:     Current Facility-Administered Medications   Medication Dose Route Frequency Provider Last Rate Last Dose   • acetaminophen (TYLENOL) tablet 650 mg  650 mg Oral Q4H PRN Juliocesar Guerra MD       • apixaban (ELIQUIS) tablet 5 mg  5 mg Oral Q12H Cornelius Carrasco DO       • atorvastatin (LIPITOR) tablet 80 mg  80 mg Oral Daily Juliocesar Guerra MD   80 mg at 10/10/20 0844   • clopidogrel (PLAVIX) tablet 75 mg  75 mg Oral Daily Juliocesar Guerra MD   75 mg at 10/10/20 0844   • dextrose (D50W) 25 g/ 50mL Intravenous Solution 25 g  25 g Intravenous Q15 Min PRN Juliocesar Guerra MD       • dextrose (GLUTOSE) oral gel 15 g  15 g Oral Q15 Min PRN Juliocesar Guerra MD       • glucagon (human recombinant) (GLUCAGEN DIAGNOSTIC) injection 1 mg  1 mg Subcutaneous Q15 Min PRN Juliocesar Guerra MD       • influenza vac split quad (FLUZONE,FLUARIX,AFLURIA,FLULAVAL) injection 0.5 mL  0.5 mL Intramuscular During Hospitalization Juliocesar Guerra MD       • insulin detemir (LEVEMIR) injection 20 Units  20 Units Subcutaneous Daily Juliocesar Guerra MD   20 Units at 10/10/20 0843   • insulin lispro (humaLOG) injection 2-7 Units  2-7 Units Subcutaneous TID  Juliocesar Guerra MD       • metoprolol succinate XL (TOPROL-XL) 24 hr tablet 50 mg  50 mg Oral Daily Juliocesar Guerra MD   50 mg at 10/10/20 0844   • pantoprazole (PROTONIX) EC tablet 40 mg  40 mg Oral QAM AC Juliocesar Guerra MD   40 mg at 10/10/20 0843   • piperacillin-tazobactam (ZOSYN) 3.375 g in iso-osmotic dextrose 50 ml (premix)  3.375 g Intravenous Q8H Danitza Hidalgo MD   3.375 g at  10/10/20 1351   • sodium chloride 0.9 % flush 10 mL  10 mL Intravenous PRN Shoulders, Kremily Santana APRN       • sodium chloride 0.9 % flush 10 mL  10 mL Intravenous Q12H Juliocesar Guerra MD   10 mL at 10/09/20 2316   • sodium chloride 0.9 % flush 10 mL  10 mL Intravenous PRN Juliocesar Guerra MD       • sodium chloride 0.9 % infusion  100 mL/hr Intravenous Continuous Juliocesar Guerra  mL/hr at 10/10/20 1230 100 mL/hr at 10/10/20 1230   • sodium chloride nasal spray 1 spray  1 spray Each Nare PRN Juliocesar Guerra MD   1 spray at 10/10/20 0412   • vancomycin (VANCOCIN) in iso-osmotic dextrose IVPB 1 g (premix) 200 mL  1,000 mg Intravenous Q24H Cornelius Carrasco DO   1,000 mg at 10/10/20 1551       Home Meds:  Prior to Admission medications    Medication Sig Start Date End Date Taking? Authorizing Provider   aspirin (ECOTRIN LOW STRENGTH) 81 MG EC tablet Take 81 mg by mouth.    Kenn Spears MD   atorvastatin (LIPITOR) 80 MG tablet Take 80 mg by mouth.    Kenn Spears MD   cefdinir (OMNICEF) 300 MG capsule  5/7/20   Kenn Spears MD   clopidogrel (PLAVIX) 75 MG tablet Take 75 mg by mouth.    Kenn Spears MD   Empagliflozin (Jardiance) 10 MG tablet 10 mg Daily. 3/18/20   Kenn Spears MD   fenofibric acid (TRILIPIX) 135 MG capsule delayed-release delayed release capsule Take 135 mg by mouth Daily. 11/25/19   Kenn Spears MD   Insulin Glargine (LANTUS SOLOSTAR) 100 UNIT/ML injection pen Inject 50 Units under the skin into the appropriate area as directed 2 (Two) Times a Day.    Kenn Spears MD   metoprolol succinate XL (TOPROL-XL) 50 MG 24 hr tablet Take 50 mg by mouth.    Kenn Spears MD   Multiple Vitamins-Minerals (MULTIVITAMIN WITH MINERALS) tablet tablet Take 1 tablet by mouth Daily.    Kenn Spears MD   omega-3 acid ethyl esters (LOVAZA) 1 g capsule Take 1 g by mouth 4 (Four) Times a Day. 12/16/19    Kenn Spears MD   omeprazole (priLOSEC) 20 MG capsule Take 20 mg by mouth Daily.    Kenn Spears MD   ONE TOUCH ULTRA TEST test strip CHECK BLOOD SUGAR 3 TIMES A DAY *E11.9* 11/25/19   Kenn Spears MD   pentoxifylline (TRENtal) 400 MG CR tablet Take 400 mg by mouth 3 (Three) Times a Day. 4/19/20   Kenn Spears MD   potassium chloride (K-DUR,KLOR-CON) 20 MEQ CR tablet Take 40 mEq by mouth.    Kenn Spears MD   pregabalin (LYRICA) 75 MG capsule Take 75 mg by mouth.    Kenn Spears MD   REGRANEX 0.01 % gel APPLY THIN LAYER TO AFFECTED AREA ONCE DAILY EVERY 12 HOURS ON, 12 HOURS OFF 4/9/20   Kenn Spears MD   SITagliptin-MetFORMIN HCl ER (JANUMET XR)  MG tablet Take 1 tablet by mouth.    Kenn Spears MD   spironolactone (ALDACTONE) 25 MG tablet Take 50 mg by mouth.    Kenn Spears MD   tiZANidine (ZANAFLEX) 4 MG tablet Take 4 mg by mouth 3 (Three) Times a Day As Needed. 12/16/19   Kenn Spears MD   triamterene-hydrochlorothiazide (DYAZIDE) 37.5-25 MG per capsule Take 1 capsule by mouth.    Kenn Spears MD   valsartan (DIOVAN) 80 MG tablet Take 80 mg by mouth.    Kenn Spears MD            Allergies   Allergen Reactions   • Iodine Unknown - High Severity   • Shellfish-Derived Products Anaphylaxis   • Sulfa Antibiotics Hives       Review of Systems   Constitutional: Negative for fever.   HENT: Negative for congestion.    Eyes: Negative for photophobia.   Respiratory: Negative for cough.    Cardiovascular: Negative for chest pain.   Genitourinary: Negative for dysuria.   Skin: Positive for color change and wound.   Allergic/Immunologic: Negative for immunocompromised state.   Neurological: Positive for weakness.   Psychiatric/Behavioral: Negative for confusion.         Vital Signs:  /60 (BP Location: Right arm, Patient Position: Lying)   Pulse 89   Temp 97.9 °F (36.6 °C) (Oral)   Resp 20   Ht 172.7  "cm (67.99\")   Wt 120 kg (264 lb 8.8 oz)   SpO2 93%   BMI 40.23 kg/m²   Temp (24hrs), Av °F (36.7 °C), Min:97.7 °F (36.5 °C), Max:98.4 °F (36.9 °C)      Physical Exam    General: Patient is a morbidly obese gentleman sitting up in his wheelchair with his feet flat on the foot rest appearing to be asleep  HEENT: Sclera anicteric, patient put on his face covering when requested  Respiratory: Effort even.  Unlabored.  Abdomen: Obese  Extremities: Gauze dressings were removed.  They were completely soaked with clear drainage.  There was malodor.  See photos.  Patient does have fairly symmetric erythema below the knees.  He has various areas of erosion but no deep wounds that I can appreciate.  Very thickened toenails consistent with onychomycosis.  Various areas of thickened scaly skin and moisture between toes.  Right pretibial region with plaque-like light-colored scaly lesion.  Neuro: Alert, oriented, speech clear  Psych: Cooperative with request to remove his dressings and to get back to bed and elevate LEs                  Results Review:    I reviewed the patient's new clinical results.    Lab Results:  CBC:   Lab Results   Lab 10/09/20  1422 10/10/20  0556   WBC 9.06 7.97   HEMOGLOBIN 10.0* 10.1*   HEMATOCRIT 31.4* 32.2*   PLATELETS 455* 466*       CMP:   Lab Results   Lab 10/09/20  1609 10/10/20  0556   SODIUM 135* 139   POTASSIUM 5.3* 4.9   CHLORIDE 102 104   CO2 21.0* 22.0   BUN 73* 71*   CREATININE 2.91* 2.14*   CALCIUM 9.6 10.0   BILIRUBIN 0.3  --    ALK PHOS 22*  --    ALT (SGPT) 17  --    AST (SGOT) 21  --    GLUCOSE 104* 131*       Lab Results (last 72 hours)     Procedure Component Value Units Date/Time    Blood Culture With RONALDO - Blood, Arm, Left [[id number]] Collected: 10/09/20 142    Specimen: Blood from Arm, Left Updated: 10/10/20 1445     Blood Culture No growth at 24 hours    POC Glucose Once [[id number]]  (Normal) Collected: 10/10/20 1225    Specimen: Blood Updated: 10/10/20 1236     Glucose " 115 mg/dL     POC Glucose Once [[id number]]  (Abnormal) Collected: 10/10/20 0830    Specimen: Blood Updated: 10/10/20 0841     Glucose 147 mg/dL     aPTT [[id number]]  (Abnormal) Collected: 10/10/20 0556    Specimen: Blood Updated: 10/10/20 0657     PTT 49.3 seconds     Vancomycin, Peak [[id number]]  (Abnormal) Collected: 10/10/20 0556    Specimen: Blood Updated: 10/10/20 0645     Vancomycin Peak 17.10 mcg/mL     Basic Metabolic Panel [[id number]]  (Abnormal) Collected: 10/10/20 0556    Specimen: Blood Updated: 10/10/20 0633     Glucose 131 mg/dL      BUN 71 mg/dL      Creatinine 2.14 mg/dL      Sodium 139 mmol/L      Potassium 4.9 mmol/L      Chloride 104 mmol/L      CO2 22.0 mmol/L      Calcium 10.0 mg/dL      eGFR Non African Amer 31 mL/min/1.73      BUN/Creatinine Ratio 33.2     Anion Gap 13.0 mmol/L     Narrative:      GFR Normal >60  Chronic Kidney Disease <60  Kidney Failure <15      CBC & Differential [[id number]]  (Abnormal) Collected: 10/10/20 0556    Specimen: Blood Updated: 10/10/20 0616    Narrative:      The following orders were created for panel order CBC & Differential.  Procedure                               Abnormality         Status                     ---------                               -----------         ------                     CBC Auto Differential[[id number]]        Abnormal            Final result                 Please view results for these tests on the individual orders.    CBC Auto Differential [[id number]]  (Abnormal) Collected: 10/10/20 0556    Specimen: Blood Updated: 10/10/20 0616     WBC 7.97 10*3/mm3      RBC 3.81 10*6/mm3      Hemoglobin 10.1 g/dL      Hematocrit 32.2 %      MCV 84.5 fL      MCH 26.5 pg      MCHC 31.4 g/dL      RDW 19.2 %      RDW-SD 59.6 fl      MPV 10.2 fL      Platelets 466 10*3/mm3      Neutrophil % 75.2 %      Lymphocyte % 10.2 %      Monocyte % 11.7 %      Eosinophil % 0.8 %      Basophil % 0.5 %      Immature Grans % 1.6 %      Neutrophils, Absolute 6.00  10*3/mm3      Lymphocytes, Absolute 0.81 10*3/mm3      Monocytes, Absolute 0.93 10*3/mm3      Eosinophils, Absolute 0.06 10*3/mm3      Basophils, Absolute 0.04 10*3/mm3      Immature Grans, Absolute 0.13 10*3/mm3      nRBC 0.0 /100 WBC     Wound Culture - Wound, Leg, Left [[id number]] Collected: 10/09/20 1609    Specimen: Wound from Leg, Left Updated: 10/10/20 0156     Gram Stain No WBCs or organisms seen    aPTT [[id number]]  (Abnormal) Collected: 10/09/20 2321    Specimen: Blood Updated: 10/09/20 2355     PTT 38.7 seconds     Hemoglobin A1c [[id number]]  (Abnormal) Collected: 10/09/20 1422    Specimen: Blood Updated: 10/09/20 2223     Hemoglobin A1C 5.80 %     Narrative:      Hemoglobin A1C Ranges:    Increased Risk for Diabetes  5.7% to 6.4%  Diabetes                     >= 6.5%  Diabetic Goal                < 7.0%    POC Glucose Once [[id number]]  (Normal) Collected: 10/09/20 2124    Specimen: Blood Updated: 10/09/20 2136     Glucose 130 mg/dL     BNP [[id number]]  (Normal) Collected: 10/09/20 1609    Specimen: Blood Updated: 10/09/20 1959     proBNP 230.6 pg/mL     Narrative:      Among patients with dyspnea, NT-proBNP is highly sensitive for the detection of acute congestive heart failure. In addition NT-proBNP of <300 pg/ml effectively rules out acute congestive heart failure with 99% negative predictive value.    Results may be falsely decreased if patient taking Biotin.      aPTT [[id number]]  (Abnormal) Collected: 10/09/20 1901    Specimen: Blood Updated: 10/09/20 1917     PTT 35.2 seconds     Comprehensive Metabolic Panel [[id number]]  (Abnormal) Collected: 10/09/20 1609    Specimen: Blood Updated: 10/09/20 1634     Glucose 104 mg/dL      BUN 73 mg/dL      Creatinine 2.91 mg/dL      Sodium 135 mmol/L      Potassium 5.3 mmol/L      Chloride 102 mmol/L      CO2 21.0 mmol/L      Calcium 9.6 mg/dL      Total Protein 7.3 g/dL      Albumin 3.40 g/dL      ALT (SGPT) 17 U/L      AST (SGOT) 21 U/L      Alkaline  Phosphatase 22 U/L      Total Bilirubin 0.3 mg/dL      eGFR Non African Amer 22 mL/min/1.73      Globulin 3.9 gm/dL      A/G Ratio 0.9 g/dL      BUN/Creatinine Ratio 25.1     Anion Gap 12.0 mmol/L     Narrative:      GFR Normal >60  Chronic Kidney Disease <60  Kidney Failure <15      COVID PRE-OP / PRE-PROCEDURE SCREENING ORDER (NO ISOLATION) - Swab, Nasal Cavity [[id number]]  (Normal) Collected: 10/09/20 1424    Specimen: Swab from Nasal Cavity Updated: 10/09/20 1518    Narrative:      The following orders were created for panel order COVID PRE-OP / PRE-PROCEDURE SCREENING ORDER (NO ISOLATION) - Swab, Nasal Cavity.  Procedure                               Abnormality         Status                     ---------                               -----------         ------                     COVID-19, ABBOTT IN-HOUS...[[id number]]  Normal              Final result                 Please view results for these tests on the individual orders.    COVID-19, ABBOTT IN-HOUSE,NP Swab (NO TRANSPORT MEDIA) 2 HR TAT - Swab, Nasal Cavity [[id number]]  (Normal) Collected: 10/09/20 1424    Specimen: Swab from Nasal Cavity Updated: 10/09/20 1518     COVID19 Not Detected    Narrative:      Fact sheet for providers: https://www.fda.gov/media/841541/download     Fact sheet for patients: https://www.fda.gov/media/026807/download    Lactic Acid, Plasma [[id number]]  (Normal) Collected: 10/09/20 1422    Specimen: Blood Updated: 10/09/20 1449     Lactate 1.4 mmol/L     Protime-INR [[id number]]  (Abnormal) Collected: 10/09/20 1422    Specimen: Blood Updated: 10/09/20 1441     Protime 13.9 Seconds      INR 1.11    aPTT [[id number]]  (Normal) Collected: 10/09/20 1422    Specimen: Blood Updated: 10/09/20 1441     PTT 32.2 seconds     CBC & Differential [[id number]]  (Abnormal) Collected: 10/09/20 1422    Specimen: Blood Updated: 10/09/20 1432    Narrative:      The following orders were created for panel order CBC & Differential.  Procedure                                Abnormality         Status                     ---------                               -----------         ------                     CBC Auto Differential[[id number]]        Abnormal            Final result                 Please view results for these tests on the individual orders.    CBC Auto Differential [[id number]]  (Abnormal) Collected: 10/09/20 1422    Specimen: Blood Updated: 10/09/20 1432     WBC 9.06 10*3/mm3      RBC 3.68 10*6/mm3      Hemoglobin 10.0 g/dL      Hematocrit 31.4 %      MCV 85.3 fL      MCH 27.2 pg      MCHC 31.8 g/dL      RDW 19.4 %      RDW-SD 60.7 fl      MPV 10.6 fL      Platelets 455 10*3/mm3      Neutrophil % 76.7 %      Lymphocyte % 10.0 %      Monocyte % 10.0 %      Eosinophil % 1.0 %      Basophil % 0.3 %      Immature Grans % 2.0 %      Neutrophils, Absolute 6.94 10*3/mm3      Lymphocytes, Absolute 0.91 10*3/mm3      Monocytes, Absolute 0.91 10*3/mm3      Eosinophils, Absolute 0.09 10*3/mm3      Basophils, Absolute 0.03 10*3/mm3      Immature Grans, Absolute 0.18 10*3/mm3      nRBC 0.0 /100 WBC           Culture Results:    Blood Culture   Date Value Ref Range Status   10/09/2020 No growth at 24 hours  Preliminary         Radiology:   Imaging Results (Last 72 Hours)     Procedure Component Value Units Date/Time    US Venous Doppler Lower Extremity Bilateral (duplex) [[id number]] Resulted: 10/09/20 1657     Updated: 10/09/20 1702    CT Head Without Contrast [[id number]] Collected: 10/09/20 1505     Updated: 10/09/20 1524    Narrative:      EXAMINATION:  CT HEAD WO CONTRAST-  10/9/2020 2:47 PM CDT     HISTORY: Mental status change, unknown cause. The patient had difficulty  holding still for the examination.     TECHNIQUE: Multiple axial images were obtained through the brain without  contrast infusion. Multiplanar images were reconstructed.     DLP: 1674 mGy-cm. Automated dosage control was utilized.     COMPARISON: No comparison study.     FINDINGS: There is mild  motion artifact. There are no hemorrhage, edema  or mass effect. 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 The ventricular system is  nondilated. The visualized paranasal sinuses are clear. The mastoid air  cells are clear. No calvarial fracture is seen.       Impression:      No hemorrhage, edema or mass effect. No acute findings.     The full report of this exam was immediately signed and available to the  emergency room. The patient is currently in the emergency room.     This report was finalized on 10/09/2020 15:07 by Dr. Salinas Morgan MD.    XR Tibia Fibula 2 View Right [[id number]] Collected: 10/09/20 1453     Updated: 10/09/20 1503    Narrative:         Exam:   XR TIBIA FIBULA 2 VW RIGHT-       Date:  10/9/2020      History:  Male, age  65 years;redness, swelling     COMPARISON:  None.     Findings :     There is no acute displaced fracture. No dislocation. No suspicious  lytic or blastic lesion. No radiopaque foreign body.     Vascular calcifications.          Impression:      Impression:     No acute osseous pathology.     This report was finalized on 10/09/2020 14:54 by Dr. Enedina Parmar MD.    XR Tibia Fibula 2 View Left [[id number]] Collected: 10/09/20 1453     Updated: 10/09/20 1503    Narrative:      EXAMINATION:  XR TIBIA FIBULA 2 VW LEFT-  10/9/2020 2:29 PM CDT     HISTORY: Redness and swelling.     COMPARISON: No comparison study.     TECHNIQUE: 2 views were obtained.     FINDINGS:  The left tibia and fibula are intact. Vascular calcification  is noted. There is degenerative osteoarthritis of the left knee. There  is subcutaneous edema throughout the visualized lower leg.       Impression:      1. No acute bony abnormality of the tibia or fibula.  2. Degenerative osteoarthritis left knee.  3. Diffuse subcutaneous edema within the soft tissues. Vascular  calcification.     This report was finalized on 10/09/2020 14:54 by Dr. Salinas Morgan MD.    XR Foot 3+ View Right [[id number]] Collected: 10/09/20 1454      Updated: 10/09/20 1502    Narrative:         Exam:   XR FOOT 3+ VW RIGHT-       Date:  10/9/2020      History:  Male, age  65 years;redness, swelling     COMPARISON:  None.     Findings :  Mild nonspecific soft tissue swelling. 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     The Lisfranc joint spacing is within normal limits. No acute finding at  the base of fifth metatarsal bone. Degenerative changes in the foot  distally.        There is no acute displaced fracture. No dislocation. No suspicious  lytic or blastic lesion. No radiopaque foreign body.          Impression:      Impression:     Soft tissue swelling and prior radiation of the first digit, without  evidence of acute osteomyelitis..     This report was finalized on 10/09/2020 14:55 by Dr. Enedina Parmar MD.            Assessment/Plan       Cellulitis of left leg    BMI 40.0-44.9, adult (CMS/[facility])    Tobacco abuse    Chronic bilateral low back pain without sciatica    Acute renal failure (ARF) (CMS/[facility])    Type 2 diabetes mellitus with hyperglycemia, with long-term current use of insulin (CMS/[facility])    Diabetic neuropathy (CMS/[facility])    Acute deep vein thrombosis (DVT) of femoral vein of left lower extremity (CMS/[facility])    Essential hypertension    Coronary artery disease with history of PCI and stent placement      IMPRESSION:  Mild extremity edema/erythema/weeping of serous fluid.  Erythema fairly intense and could be consistent with cellulitis, however patient has normal white count, no fever and reports no chills or fever prior to admission.  Suspect this is more consistent with chronic venous stasis and patient who does not tolerate compression wrapping of his lower extremities, does not elevate his extremities at the level of the heart or even above the level heart.    His skin has various areas that are not intact and that coupled with his severe onychomycosis certainly predisposes him to cellulitis.      RECOMMENDATION:   · ELEVATE LE above the  level of heart  · Would not be opposed to stopping vancomycin/zosyn and monitoring patient clinically with elevation of the lower extremities.    Discussed with nursing.  They will going toSee how to maneuver the new bed to get the feet above the level of the heart.  I also asked the patient to always recline and elevate the foot of the recliner if he is out of bed.    Explained to the patient did not think he will have resolution of this problem at all until he gets control of the LE fluid.  I do not think diuresing alone is the answer, however compression elevation will likely make significant difference.    I would also recommend that he be treated for his onychomycosis with oral Lamisil by his PCP and this could be done as an outpatient so labs could be monitored.          Danitza Hidalgo MD  10/10/20  16:19 CDT

## 2020-10-11 LAB
ANION GAP SERPL CALCULATED.3IONS-SCNC: 7 MMOL/L (ref 5–15)
BUN SERPL-MCNC: 45 MG/DL (ref 8–23)
BUN/CREAT SERPL: 35.2 (ref 7–25)
CALCIUM SPEC-SCNC: 9.9 MG/DL (ref 8.6–10.5)
CHLORIDE SERPL-SCNC: 106 MMOL/L (ref 98–107)
CO2 SERPL-SCNC: 25 MMOL/L (ref 22–29)
CREAT SERPL-MCNC: 1.28 MG/DL (ref 0.76–1.27)
DEPRECATED RDW RBC AUTO: 58.8 FL (ref 37–54)
ERYTHROCYTE [DISTWIDTH] IN BLOOD BY AUTOMATED COUNT: 19.1 % (ref 12.3–15.4)
GFR SERPL CREATININE-BSD FRML MDRD: 57 ML/MIN/1.73
GLUCOSE BLDC GLUCOMTR-MCNC: 106 MG/DL (ref 70–130)
GLUCOSE BLDC GLUCOMTR-MCNC: 124 MG/DL (ref 70–130)
GLUCOSE BLDC GLUCOMTR-MCNC: 193 MG/DL (ref 70–130)
GLUCOSE BLDC GLUCOMTR-MCNC: 93 MG/DL (ref 70–130)
GLUCOSE SERPL-MCNC: 93 MG/DL (ref 65–99)
HCT VFR BLD AUTO: 32 % (ref 37.5–51)
HGB BLD-MCNC: 10 G/DL (ref 13–17.7)
MCH RBC QN AUTO: 26.4 PG (ref 26.6–33)
MCHC RBC AUTO-ENTMCNC: 31.3 G/DL (ref 31.5–35.7)
MCV RBC AUTO: 84.4 FL (ref 79–97)
PLATELET # BLD AUTO: 474 10*3/MM3 (ref 140–450)
PMV BLD AUTO: 10.3 FL (ref 6–12)
POTASSIUM SERPL-SCNC: 4.2 MMOL/L (ref 3.5–5.2)
RBC # BLD AUTO: 3.79 10*6/MM3 (ref 4.14–5.8)
SODIUM SERPL-SCNC: 138 MMOL/L (ref 136–145)
VANCOMYCIN SERPL-MCNC: 14.5 MCG/ML (ref 5–40)
WBC # BLD AUTO: 8.25 10*3/MM3 (ref 3.4–10.8)

## 2020-10-11 PROCEDURE — 63710000001 INSULIN DETEMIR PER 5 UNITS: Performed by: FAMILY MEDICINE

## 2020-10-11 PROCEDURE — 80202 ASSAY OF VANCOMYCIN: CPT | Performed by: FAMILY MEDICINE

## 2020-10-11 PROCEDURE — 25010000002 PIPERACILLIN SOD-TAZOBACTAM PER 1 G: Performed by: INTERNAL MEDICINE

## 2020-10-11 PROCEDURE — 85027 COMPLETE CBC AUTOMATED: CPT | Performed by: INTERNAL MEDICINE

## 2020-10-11 PROCEDURE — 97535 SELF CARE MNGMENT TRAINING: CPT | Performed by: OCCUPATIONAL THERAPIST

## 2020-10-11 PROCEDURE — 97166 OT EVAL MOD COMPLEX 45 MIN: CPT | Performed by: OCCUPATIONAL THERAPIST

## 2020-10-11 PROCEDURE — 82962 GLUCOSE BLOOD TEST: CPT

## 2020-10-11 PROCEDURE — 80048 BASIC METABOLIC PNL TOTAL CA: CPT | Performed by: FAMILY MEDICINE

## 2020-10-11 RX ORDER — VANCOMYCIN HYDROCHLORIDE 1 G/200ML
1000 INJECTION, SOLUTION INTRAVENOUS EVERY 12 HOURS
Status: DISCONTINUED | OUTPATIENT
Start: 2020-10-11 | End: 2020-10-11

## 2020-10-11 RX ORDER — NYSTATIN 100000 [USP'U]/G
POWDER TOPICAL EVERY 12 HOURS SCHEDULED
Status: DISCONTINUED | OUTPATIENT
Start: 2020-10-11 | End: 2020-10-14 | Stop reason: HOSPADM

## 2020-10-11 RX ORDER — NICOTINE 21 MG/24HR
1 PATCH, TRANSDERMAL 24 HOURS TRANSDERMAL
Status: DISCONTINUED | OUTPATIENT
Start: 2020-10-11 | End: 2020-10-14 | Stop reason: HOSPADM

## 2020-10-11 RX ADMIN — SODIUM CHLORIDE 100 ML/HR: 9 INJECTION, SOLUTION INTRAVENOUS at 07:08

## 2020-10-11 RX ADMIN — NYSTATIN: 100000 POWDER TOPICAL at 20:43

## 2020-10-11 RX ADMIN — PANTOPRAZOLE SODIUM 40 MG: 40 TABLET, DELAYED RELEASE ORAL at 09:32

## 2020-10-11 RX ADMIN — TAZOBACTAM SODIUM AND PIPERACILLIN SODIUM 3.38 G: 375; 3 INJECTION, SOLUTION INTRAVENOUS at 07:07

## 2020-10-11 RX ADMIN — INSULIN DETEMIR 20 UNITS: 100 INJECTION, SOLUTION SUBCUTANEOUS at 09:32

## 2020-10-11 RX ADMIN — APIXABAN 5 MG: 5 TABLET, FILM COATED ORAL at 09:32

## 2020-10-11 RX ADMIN — SODIUM CHLORIDE, PRESERVATIVE FREE 10 ML: 5 INJECTION INTRAVENOUS at 20:43

## 2020-10-11 RX ADMIN — METOPROLOL SUCCINATE 50 MG: 50 TABLET, EXTENDED RELEASE ORAL at 09:32

## 2020-10-11 RX ADMIN — APIXABAN 5 MG: 5 TABLET, FILM COATED ORAL at 20:43

## 2020-10-11 RX ADMIN — ATORVASTATIN CALCIUM 80 MG: 40 TABLET, FILM COATED ORAL at 09:32

## 2020-10-11 RX ADMIN — CLOPIDOGREL BISULFATE 75 MG: 75 TABLET, FILM COATED ORAL at 09:32

## 2020-10-11 RX ADMIN — NICOTINE 1 PATCH: 21 PATCH, EXTENDED RELEASE TRANSDERMAL at 16:49
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
## 2020-10-11 NOTE — PROGRESS NOTES
" Pharmacy Dosing Service  Pharmacokinetics  Vancomycin Follow-up Evaluation  Assessment/Action/Plan:  Loading dose: 2000 mg IV (started @18:52 10/09/20)  Current Order: 1000 mg IV every 24 hours     Current end date: final dose currently 10/13/20 (5 days ordered)    Levels:   10/10/20 vancomycin \"peak\" random = 17.1 mcg/mL (~11 hours post-dose 2000 mg)  10/11/20 vancomycin random = 14.5 mcg/mL (~11 hours post-dose 1000 mg)  Additional antimicrobial agent(s): Zosyn  Additional considerations: MONI, obesity, PMH of DM     MONI resolving; vancomycin dosage adjusted accordingly based updated pharmacokinetic parameters: 1000 mg IV every 12 hours starting 10/11/20 @15:00.     Pharmacy will continue to follow daily and adjust dose accordingly.      Subjective:  Manuel Clark is a 65 y.o. male on Vancomycin for the treatment of SSTI, day 3 of 5.     AUC Model Data:  Model: adult, obese  Regimen: 1000 mg every 12 hours   Exposure target: AUC24 (range)400-600 mg/L.hr  AUC24,ss: 477 mg/L.hr  PAUC*: 95 %  Ctrough,ss: 14.9 mg/L  Pconc*: 2 %  Tox.: 10 %          Past Medical / Surgical / Social History reviewed.     Objective:  65 y.o. male Ht: 172.7 cm (67.99\"); Wt: 120 kg (264 lb 8.8 oz) Body mass index is 40.23 kg/m².  Estimated Creatinine Clearance: 73.4 mL/min (A) (by C-G formula based on SCr of 1.28 mg/dL (H)).    BUN   Date Value Ref Range Status   10/11/2020 45 (H) 8 - 23 mg/dL Final   10/10/2020 71 (H) 8 - 23 mg/dL Final   10/09/2020 73 (H) 8 - 23 mg/dL Final      Creatinine   Date Value Ref Range Status   10/11/2020 1.28 (H) 0.76 - 1.27 mg/dL Final   10/10/2020 2.14 (H) 0.76 - 1.27 mg/dL Final   10/09/2020 2.91 (H) 0.76 - 1.27 mg/dL Final      Lab Results   Component Value Date    WBC 8.25 10/11/2020    WBC 7.97 10/10/2020    WBC 9.06 10/09/2020        Lab Results   Component Value Date    CHUCKAK 17.10 (L) 10/10/2020    CYNTHIA 14.50 10/11/2020         Culture Results:  Microbiology Results (last 10 days)       " Procedure Component Value - Date/Time    Wound Culture - Wound, Leg, Left [[id number]]  (Abnormal) Collected: 10/09/20 1609    Lab Status: Preliminary result Specimen: Wound from Leg, Left Updated: 10/11/20 0932     Wound Culture Light growth (2+) Staphylococcus aureus, MRSA     Comment: Methicillin resistant Staphylococcus aureus, Patient may be an isolation risk.         Scant growth (1+) Gram Negative Bacilli      Rare Gram Negative Bacilli     Gram Stain No WBCs or organisms seen    COVID PRE-OP / PRE-PROCEDURE SCREENING ORDER (NO ISOLATION) - Swab, Nasal Cavity [[id number]]  (Normal) Collected: 10/09/20 1424    Lab Status: Final result Specimen: Swab from Nasal Cavity Updated: 10/09/20 1518    Narrative:      The following orders were created for panel order COVID PRE-OP / PRE-PROCEDURE SCREENING ORDER (NO ISOLATION) - Swab, Nasal Cavity.  Procedure                               Abnormality         Status                     ---------                               -----------         ------                     COVID-19, [facility] IN-HOUS...[[id number]]  Normal              Final result                 Please view results for these tests on the individual orders.    COVID-19, [facility] IN-HOUSE,NP Swab (NO TRANSPORT MEDIA) 2 HR TAT - Swab, Nasal Cavity [[id number]]  (Normal) Collected: 10/09/20 1424    Lab Status: Final result Specimen: Swab from Nasal Cavity Updated: 10/09/20 1518     COVID19 Not Detected    Narrative:      Fact sheet for providers: https://www.fda.gov/media/079812/download     Fact sheet for patients: https://www.fda.gov/media/375980/download    Blood Culture With RONALDO - Blood, Arm, Left [[id number]] Collected: 10/09/20 1422    Lab Status: Preliminary result Specimen: Blood from Arm, Left Updated: 10/10/20 1445     Blood Culture No growth at 24 hours            Santosh Meredith, PharmD  10/11/20 13:50 CDT
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
## 2020-10-11 NOTE — PROGRESS NOTES
Discharge Planning Assessment  [facility]     Patient Name: Manuel Clark  MRN: [id number]  Today's Date: 10/11/2020    Admit Date: 10/9/2020    Discharge Needs Assessment     Row Name 10/11/20 1458       Living Environment    Lives With  spouse    Current Living Arrangements  home/apartment/condo    Primary Care Provided by  self    Provides Primary Care For  no one    Family Caregiver if Needed  spouse    Quality of Family Relationships  helpful;involved;supportive    Able to Return to Prior Arrangements  yes       Resource/Environmental Concerns    Resource/Environmental Concerns  none    Transportation Concerns  car, none       Transition Planning    Patient/Family Anticipates Transition to  home with family;inpatient rehabilitation facility    Patient/Family Anticipated Services at Transition  none    Transportation Anticipated  family or friend will provide       Discharge Needs Assessment    Readmission Within the Last 30 Days  no previous admission in last 30 days    Equipment Currently Used at Home  walker, rolling    Concerns to be Addressed  no discharge needs identified;denies needs/concerns at this time    Anticipated Changes Related to Illness  none    Equipment Needed After Discharge  none    Discharge Facility/Level of Care Needs  nursing facility, skilled    Provided Post Acute Provider List?  Yes    Post Acute Provider List  Nursing Home    Provided Post Acute Provider Quality & Resource List?  Yes    Post Acute Provider Quality and Resource List  Nursing Home    Delivered To  Patient    Method of Delivery  Telephone    Discharge Coordination/Progress  Pt has RX coverage and a PCP. Pt lived at home with his wife prior to admission. Pt is not able to ambulate well at this time. MELISA spoke with pts wife and pt who have both agreed to SNF placement but requested tonight to make phone calls to determine which facilities referrals can be faxed to. MELISA will speak with family tomorrow and send referrals.         Discharge Plan    No documentation.       Continued Care and Services - Admitted Since 10/9/2020    Coordination has not been started for this encounter.         Demographic Summary    No documentation.       Functional Status    No documentation.       Psychosocial    No documentation.       Abuse/Neglect    No documentation.       Legal    No documentation.       Substance Abuse    No documentation.       Patient Forms    No documentation.           Desirae Kendrick

## 2020-10-11 NOTE — PLAN OF CARE
Problem: Adult Inpatient Plan of Care  Goal: Plan of Care Review  Outcome: Ongoing, Progressing  Flowsheets (Taken 10/11/2020 9150)  Progress: no change  Plan of Care Reviewed With: patient  Outcome Summary: VSS. BLE remains the same, red and edematous with constant drainage. Encouraging pt to sit with legs elevated as much as possible, but pt only tolerating short amount of periods. Pt prefers legs to be dangled. Pt has been educated multiple times about the importance of keeping legs elevated. Pt becoming aggravated with staff at times. Informed pt that the staff is trying to help him get better. Pt very weak. Requires x3 people to assist pt from chair to bed. OT eval today and PT to eval tomorrow. Pt using urinal at times. Purewick in place when pt is lying in bed. No falls during shift. Safety maintained, cont to monitor.

## 2020-10-11 NOTE — PROGRESS NOTES
[facility] Medicine Services  INPATIENT PROGRESS NOTE    Patient Name: Manuel Clark  Date of Admission: 10/9/2020  Today's Date: 10/11/20  Length of Stay: 2  Primary Care Physician: Kathy Granger APRN    Subjective   Chief Complaint: Weakness.   HPI   He told me about his history with Dr. Flowers's office yesterday.  He tells me today that he is getting a second opinion from Dr. Menezes later this month.     He has not really been compliant with elevating his legs per the nursing staff.  He again refuses to consider Unna boots.    ID wants to monitor him off antibiotics.    He wants to go home soon, but cannot get around very well and has not been seen by PT yet.    Review of Systems   All pertinent negatives and positives are as above. All other systems have been reviewed and are negative unless otherwise stated.     Objective    Temp:  [97.2 °F (36.2 °C)-98 °F (36.7 °C)] 98 °F (36.7 °C)  Heart Rate:  [89-97] 91  Resp:  [20] 20  BP: (100-138)/(60-78) 131/75  Physical Exam  Constitutional:       Appearance: He is well-developed.      Comments: Up in on the side of the bed after much difficulty with getting to that position with my assistance.  No family present. Seen and discussed with his nurse, Erica.   HENT:      Head: Normocephalic and atraumatic.   Eyes:      Conjunctiva/sclera: Conjunctivae normal.      Pupils: Pupils are equal, round, and reactive to light.   Neck:      Musculoskeletal: Neck supple.      Vascular: No JVD.   Cardiovascular:      Rate and Rhythm: Normal rate and regular rhythm.      Heart sounds: Normal heart sounds. No murmur. No friction rub. No gallop.    Pulmonary:      Effort: Pulmonary effort is normal. No respiratory distress.      Breath sounds: Normal breath sounds. No wheezing or rales.   Chest:      Chest wall: No tenderness.   Abdominal:      General: Bowel sounds are normal. There is no distension.      Palpations: Abdomen is soft.       Tenderness: There is no abdominal tenderness. There is no guarding or rebound.   Musculoskeletal: Normal range of motion.         General: Swelling (L>R) present. No tenderness or deformity.   Skin:     General: Skin is warm and dry.      Findings: Erythema present. No rash.      Comments: Legs are undressed today.  See photos.   Neurological:      Mental Status: He is alert and oriented to person, place, and time.      Cranial Nerves: No cranial nerve deficit.      Motor: No abnormal muscle tone.      Deep Tendon Reflexes: Reflexes normal.   Psychiatric:      Comments: He is conversant.  He uses foul language frequently.             Results Review:  I have reviewed the labs, radiology results, and diagnostic studies.    Laboratory Data:   Results from last 7 days   Lab Units 10/11/20  0247 10/10/20  0556 10/09/20  1422   WBC 10*3/mm3 8.25 7.97 9.06   HEMOGLOBIN g/dL 10.0* 10.1* 10.0*   HEMATOCRIT % 32.0* 32.2* 31.4*   PLATELETS 10*3/mm3 474* 466* 455*     Results from last 7 days   Lab Units 10/11/20  0247 10/10/20  0556 10/09/20  1609   SODIUM mmol/L 138 139 135*   POTASSIUM mmol/L 4.2 4.9 5.3*   CHLORIDE mmol/L 106 104 102   CO2 mmol/L 25.0 22.0 21.0*   BUN mg/dL 45* 71* 73*   CREATININE mg/dL 1.28* 2.14* 2.91*   CALCIUM mg/dL 9.9 10.0 9.6   BILIRUBIN mg/dL  --   --  0.3   ALK PHOS U/L  --   --  22*   ALT (SGPT) U/L  --   --  17   AST (SGOT) U/L  --   --  21   GLUCOSE mg/dL 93 131* 104*     Culture Data:   Blood Culture   Date Value Ref Range Status   10/09/2020 No growth at 24 hours  Preliminary     Wound Culture   Date Value Ref Range Status   10/09/2020 Light growth (2+) Staphylococcus aureus, MRSA (A)  Preliminary     Comment:     Methicillin resistant Staphylococcus aureus, Patient may be an isolation risk.   10/09/2020 Scant growth (1+) Gram Negative Bacilli (A)  Preliminary   10/09/2020 Rare Gram Negative Bacilli (A)  Preliminary     I have reviewed the patient's current medications.     Assessment/Plan         Active Hospital Problems    Diagnosis   • **Cellulitis of left leg   • Acute deep vein thrombosis (DVT) of femoral vein of left lower extremity (CMS/[facility])   • Acute renal failure (ARF) (CMS/[facility])   • Type 2 diabetes mellitus with hyperglycemia, with long-term current use of insulin (CMS/[facility])   • Diabetic neuropathy (CMS/[facility])   • Essential hypertension   • Coronary artery disease with history of PCI and stent placement   • Chronic bilateral low back pain without sciatica   • BMI 40.0-44.9, adult (CMS/[facility])   • Tobacco abuse     Plan:  The patient was admitted on 10/9 by Dr. Guerra.  He presented to the emergency department primarily with complaints of swelling, discomfort, and redness of the left lower extremity.  He had recent outpatient treatment for cellulitis with clindamycin and ciprofloxacin.  He claims a history of an MRSA infection of the right toe in the past.  He was found to have a left femoral DVT on duplex in the ER.  He was also found to be in acute renal failure.    He was initially anticoagulated with therapeutic Lovenox. Transitioned him to oral Eliquis on 10/10.    He is being treated with vancomycin and Zosyn for his left lower extremity cellulitis.  Some of his skin changes may be related to the DVT, but he has chronic stasis dermatitis and recurrent cellulitis followed by wound care in [place].  The emergency department swabbed his extremities and this is of course polymicrobial.  Infectious disease was consulted by Dr. Guerra as he already had an appointment this coming week with him for this issue.  We will discontinue antibiotic therapy and monitor him clinically.  Dr. Hidalgo made recommendations for leg elevation and compression.  The patient told me yesterday that he will not accept Unna boots any longer.  This issue has been and will continue to be extremely difficult to manage for him.    His serum creatinine was 1.02 in May 2020.  GFR 74 at that time.  His serum creatinine is  improving with holding nephrotoxins and providing fluids.  Agree with holding Dyazide, valsartan, and spironolactone.  His blood pressure had been a bit soft even off of these medications, but he may need some added back soon.    He is chronically on aspirin and Plavix.  He has a history of cardiac stenting.  Given the fact that he is starting full dose anticoagulation, we will discontinue his aspirin for now and continue with Plavix.    Hemoglobin A1c 5.8 at present.  Continue basal insulin.  Janumet to be on hold secondary to his renal failure.  Jardiance not on formulary.      PT/OT to see.    Case management consultation.    DVT prophylaxis achieved by virtue of being on Eliquis.    Discharge Planning: I expect the patient to be discharged to home with home health versus skilled nursing placement in 1-2 days.    Electronically signed by Cornelius Carrasco DO, 10/11/20, 13:54 CDT.

## 2020-10-11 NOTE — PROGRESS NOTES
"INFECTIOUS DISEASES PROGRESS NOTE    Patient:  Manuel Clark  YOB: 1955  MRN: [id number]   Admit date: 10/9/2020   Admitting Physician: Cornelius Carrasco DO  Primary Care Physician: Kathy Granger APRN    Chief Complaint: \"When can I go home\"        Interval History: The patient was lying sideways across the bed while nursing staff was trying to save the patient.  His wife was at bedside.    The patient reported he has been elevating his legs some.  When I discussed what he would need to do to get home and then what he would need to do once he got there, his wife checked in a few times that he stays up in his motorized chair with his feet down front of the computer.    He does not get in a recliner or get in the bed and elevate his lower extremities.    Allergies:   Allergies   Allergen Reactions   • Iodine Unknown - High Severity   • Shellfish-Derived Products Anaphylaxis   • Sulfa Antibiotics Hives       Current Meds:     Current Facility-Administered Medications:   •  acetaminophen (TYLENOL) tablet 650 mg, 650 mg, Oral, Q4H PRN, Juliocesar Guerra MD  •  apixaban (ELIQUIS) tablet 5 mg, 5 mg, Oral, Q12H, Cornelius Carrasco DO, 5 mg at 10/11/20 0932  •  atorvastatin (LIPITOR) tablet 80 mg, 80 mg, Oral, Daily, Juliocesar Guerra MD, 80 mg at 10/11/20 0932  •  clopidogrel (PLAVIX) tablet 75 mg, 75 mg, Oral, Daily, Juliocesar Guerra MD, 75 mg at 10/11/20 0932  •  dextrose (D50W) 25 g/ 50mL Intravenous Solution 25 g, 25 g, Intravenous, Q15 Min PRN, Juliocesar Guerra MD  •  dextrose (GLUTOSE) oral gel 15 g, 15 g, Oral, Q15 Min PRN, Juliocesar Guerra MD  •  glucagon (human recombinant) (GLUCAGEN DIAGNOSTIC) injection 1 mg, 1 mg, Subcutaneous, Q15 Min PRN, Juliocesar Guerra MD  •  influenza vac split quad (FLUZONE,FLUARIX,AFLURIA,FLULAVAL) injection 0.5 mL, 0.5 mL, Intramuscular, During Hospitalization, Juliocesar Guerra MD  •  insulin detemir (LEVEMIR) " "injection 20 Units, 20 Units, Subcutaneous, Daily, Juliocesar Guerra MD, 20 Units at 10/11/20 0932  •  insulin lispro (humaLOG) injection 2-7 Units, 2-7 Units, Subcutaneous, TID AC, Juliocesar Guerra MD, 2 Units at 10/10/20 1644  •  metoprolol succinate XL (TOPROL-XL) 24 hr tablet 50 mg, 50 mg, Oral, Daily, Juliocesar Guerra MD, 50 mg at 10/11/20 0932  •  nicotine (NICODERM CQ) 21 MG/24HR patch 1 patch, 1 patch, Transdermal, Q24H, Cornelius Carrasco DO, 1 patch at 10/11/20 1649  •  nystatin (MYCOSTATIN) powder, , Topical, Q12H, Cornelius Carrasco DO  •  pantoprazole (PROTONIX) EC tablet 40 mg, 40 mg, Oral, QAM Mari LEONARD Mariano Ariel, MD, 40 mg at 10/11/20 0932  •  [COMPLETED] Insert peripheral IV, , , Once **AND** sodium chloride 0.9 % flush 10 mL, 10 mL, Intravenous, PRN, Shoulders, Krshannaee Max, APRN  •  sodium chloride 0.9 % flush 10 mL, 10 mL, Intravenous, Q12H, Juliocesar Guerra MD, 10 mL at 10/10/20 2106  •  sodium chloride 0.9 % flush 10 mL, 10 mL, Intravenous, PRN, Juliocesar Guerra MD  •  sodium chloride 0.9 % infusion, 75 mL/hr, Intravenous, Continuous, Cornelius Carrasco DO, Last Rate: 75 mL/hr at 10/11/20 1520, 75 mL/hr at 10/11/20 1520  •  sodium chloride nasal spray 1 spray, 1 spray, Each Nare, PRN, Juliocesar Guerra MD, 1 spray at 10/10/20 0412      Review of Systems  General: No fever  Cutaneous: Multiple areas of erosion, superficial wounds, erythema    Objective     Vital Signs:  Temp (24hrs), Av.6 °F (36.4 °C), Min:97.2 °F (36.2 °C), Max:98 °F (36.7 °C)      /75 (BP Location: Right arm, Patient Position: Lying)   Pulse 91   Temp 98 °F (36.7 °C) (Oral)   Resp 20   Ht 172.7 cm (67.99\")   Wt 120 kg (264 lb 8.8 oz)   SpO2 92%   BMI 40.23 kg/m²         Physical Exam   General: The patient is a morbidly obese male who is actually lying across the bed sideways while nursing staff are trying to clean out into his pannus.  He did sit upright  Lower " extremities with some improvement in the edema noted of his right foot greater than the left as evidenced by some wrinkling.  There is still superficial erosions along creases mainly around the left lower extremity-see photos  Neuro: Alert, oriented, speech clear  Psych: Pleasant and cooperative                Results Review:    I reviewed the patient's new clinical results.    Lab Results:  CBC:   Lab Results   Lab 10/09/20  1422 10/10/20  0556 10/11/20  0247   WBC 9.06 7.97 8.25   HEMOGLOBIN 10.0* 10.1* 10.0*   HEMATOCRIT 31.4* 32.2* 32.0*   PLATELETS 455* 466* 474*         CMP:   Lab Results   Lab 10/09/20  1609 10/10/20  0556 10/11/20  0247   SODIUM 135* 139 138   POTASSIUM 5.3* 4.9 4.2   CHLORIDE 102 104 106   CO2 21.0* 22.0 25.0   BUN 73* 71* 45*   CREATININE 2.91* 2.14* 1.28*   CALCIUM 9.6 10.0 9.9   BILIRUBIN 0.3  --   --    ALK PHOS 22*  --   --    ALT (SGPT) 17  --   --    AST (SGOT) 21  --   --    GLUCOSE 104* 131* 93         Culture Results:    Blood Culture   Date Value Ref Range Status   10/09/2020 No growth at 2 days  Preliminary     Wound Culture   Date Value Ref Range Status   10/09/2020 Light growth (2+) Staphylococcus aureus, MRSA (A)  Preliminary     Comment:     Methicillin resistant Staphylococcus aureus, Patient may be an isolation risk.   10/09/2020 Scant growth (1+) Gram Negative Bacilli (A)  Preliminary   10/09/2020 Rare Gram Negative Bacilli (A)  Preliminary       Microbiology Results Abnormal     Procedure Component Value - Date/Time    Blood Culture With RONALDO - Blood, Arm, Left [[id number]] Collected: 10/09/20 1422    Lab Status: Preliminary result Specimen: Blood from Arm, Left Updated: 10/11/20 1445     Blood Culture No growth at 2 days    Wound Culture - Wound, Leg, Left [[id number]]  (Abnormal) Collected: 10/09/20 1609    Lab Status: Preliminary result Specimen: Wound from Leg, Left Updated: 10/11/20 0932     Wound Culture Light growth (2+) Staphylococcus aureus, MRSA     Comment:  Methicillin resistant Staphylococcus aureus, Patient may be an isolation risk.         Scant growth (1+) Gram Negative Bacilli      Rare Gram Negative Bacilli     Gram Stain No WBCs or organisms seen    COVID PRE-OP / PRE-PROCEDURE SCREENING ORDER (NO ISOLATION) - Swab, Nasal Cavity [[id number]]  (Normal) Collected: 10/09/20 1424    Lab Status: Final result Specimen: Swab from Nasal Cavity Updated: 10/09/20 1518    Narrative:      The following orders were created for panel order COVID PRE-OP / PRE-PROCEDURE SCREENING ORDER (NO ISOLATION) - Swab, Nasal Cavity.  Procedure                               Abnormality         Status                     ---------                               -----------         ------                     COVID-19, ABBOTT IN-HOUS...[[id number]]  Normal              Final result                 Please view results for these tests on the individual orders.    COVID-19, ABBOTT IN-HOUSE,NP Swab (NO TRANSPORT MEDIA) 2 HR TAT - Swab, Nasal Cavity [[id number]]  (Normal) Collected: 10/09/20 1424    Lab Status: Final result Specimen: Swab from Nasal Cavity Updated: 10/09/20 1518     COVID19 Not Detected    Narrative:      Fact sheet for providers: https://www.fda.gov/media/266477/download     Fact sheet for patients: https://www.fda.gov/media/570125/download                Radiology:   Imaging Results (Last 72 Hours)     Procedure Component Value Units Date/Time    US Venous Doppler Lower Extremity Bilateral (duplex) [[id number]] Collected: 10/11/20 0907     Updated: 10/11/20 0913    Narrative:      History: Swelling       Impression:      Impression: There is evidence of partially occlusive deep venous  thrombosis of the left superficial femoral vein. All other visualized  veins show no evidence of DVT.     Comments: Bilateral lower extremity venous duplex exam was performed  using color Doppler flow, Doppler waveform analysis, and grayscale  imaging, with and without compression. There is evidence of  partially  occlusive DVT in a segment of the left superficial femoral vein.  Otherwise all visualized veins show no evidence of DVT. No thrombus is  identified in the saphenofemoral junctions and greater saphenous veins  bilaterally.         This report was finalized on 10/11/2020 09:10 by Dr. Michael Shaikh MD.    CT Head Without Contrast [[id number]] Collected: 10/09/20 1505     Updated: 10/09/20 1524    Narrative:      EXAMINATION:  CT HEAD WO CONTRAST-  10/9/2020 2:47 PM CDT     HISTORY: Mental status change, unknown cause. The patient had difficulty  holding still for the examination.     TECHNIQUE: Multiple axial images were obtained through the brain without  contrast infusion. Multiplanar images were reconstructed.     DLP: 1674 mGy-cm. Automated dosage control was utilized.     COMPARISON: No comparison study.     FINDINGS: There is mild motion artifact. There are no hemorrhage, edema  or mass effect. 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 The ventricular system is  nondilated. The visualized paranasal sinuses are clear. The mastoid air  cells are clear. No calvarial fracture is seen.       Impression:      No hemorrhage, edema or mass effect. No acute findings.     The full report of this exam was immediately signed and available to the  emergency room. The patient is currently in the emergency room.     This report was finalized on 10/09/2020 15:07 by Dr. Salinas Morgan MD.    XR Tibia Fibula 2 View Right [[id number]] Collected: 10/09/20 1453     Updated: 10/09/20 1503    Narrative:         Exam:   XR TIBIA FIBULA 2 VW RIGHT-       Date:  10/9/2020      History:  Male, age  65 years;redness, swelling     COMPARISON:  None.     Findings :     There is no acute displaced fracture. No dislocation. No suspicious  lytic or blastic lesion. No radiopaque foreign body.     Vascular calcifications.          Impression:      Impression:     No acute osseous pathology.     This report was finalized on 10/09/2020 14:54 by   Enedina Parmar MD.    XR Tibia Fibula 2 View Left [[id number]] Collected: 10/09/20 1453     Updated: 10/09/20 1503    Narrative:      EXAMINATION:  XR TIBIA FIBULA 2 VW LEFT-  10/9/2020 2:29 PM CDT     HISTORY: Redness and swelling.     COMPARISON: No comparison study.     TECHNIQUE: 2 views were obtained.     FINDINGS:  The left tibia and fibula are intact. Vascular calcification  is noted. There is degenerative osteoarthritis of the left knee. There  is subcutaneous edema throughout the visualized lower leg.       Impression:      1. No acute bony abnormality of the tibia or fibula.  2. Degenerative osteoarthritis left knee.  3. Diffuse subcutaneous edema within the soft tissues. Vascular  calcification.     This report was finalized on 10/09/2020 14:54 by Dr. Salinas Morgan MD.    XR Foot 3+ View Right [[id number]] Collected: 10/09/20 1454     Updated: 10/09/20 1502    Narrative:         Exam:   XR FOOT 3+ VW RIGHT-       Date:  10/9/2020      History:  Male, age  65 years;redness, swelling     COMPARISON:  None.     Findings :  Mild nonspecific soft tissue swelling. 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     The Lisfranc joint spacing is within normal limits. No acute finding at  the base of fifth metatarsal bone. Degenerative changes in the foot  distally.        There is no acute displaced fracture. No dislocation. No suspicious  lytic or blastic lesion. No radiopaque foreign body.          Impression:      Impression:     Soft tissue swelling and prior radiation of the first digit, without  evidence of acute osteomyelitis..     This report was finalized on 10/09/2020 14:55 by Dr. Enedina Parmar MD.          Assessment/Plan     Active Hospital Problems    Diagnosis   • **Cellulitis of left leg   • Essential hypertension   • Coronary artery disease with history of PCI and stent placement   • Acute renal failure (ARF) (CMS/[facility])   • Type 2 diabetes mellitus with hyperglycemia, with long-term current use  of insulin (CMS/[facility])   • Diabetic neuropathy (CMS/[facility])   • Acute deep vein thrombosis (DVT) of femoral vein of left lower extremity (CMS/[facility])   • Chronic bilateral low back pain without sciatica   • BMI 40.0-44.9, adult (CMS/[facility])   • Tobacco abuse       IMPRESSION:  Chronic venous stasis changes in a morbidly obese male treated for cellulitis for the past several months.  Patient without chills or fever or leukocytosis so leaning more towards chronic venous stasis changes over acute on chronic cellulitis.  Very hard to evaluate with such edema and patient who will not allow compression.    Acute kidney injury-significantly improved      RECOMMENDATION:   · -Patient with multiple heart.  I discussed this again with patient, nursing and his wife present    · Had noted in consult I would not be opposed to stopping the Vanco and Zosyn and monitoring to see what the patient looks like clinically with his legs elevated.  Vanco Zosyn have been discontinued.  I think will be helpful to evaluate as his lower extremities look quite red when they are dependent and this may be just secondary to positioning      Also would recommend skin care regimen of his lower extremities and treatment of onychomycosis with Lamisil and monitoring per primary provider or wound care.      Danitza Hidalgo MD  10/11/20  18:40 CDT

## 2020-10-12 LAB
ANION GAP SERPL CALCULATED.3IONS-SCNC: 9 MMOL/L (ref 5–15)
BUN SERPL-MCNC: 21 MG/DL (ref 8–23)
BUN/CREAT SERPL: 23.9 (ref 7–25)
CALCIUM SPEC-SCNC: 9.5 MG/DL (ref 8.6–10.5)
CHLORIDE SERPL-SCNC: 106 MMOL/L (ref 98–107)
CO2 SERPL-SCNC: 24 MMOL/L (ref 22–29)
CREAT SERPL-MCNC: 0.88 MG/DL (ref 0.76–1.27)
DEPRECATED RDW RBC AUTO: 57.2 FL (ref 37–54)
ERYTHROCYTE [DISTWIDTH] IN BLOOD BY AUTOMATED COUNT: 18.8 % (ref 12.3–15.4)
GFR SERPL CREATININE-BSD FRML MDRD: 87 ML/MIN/1.73
GLUCOSE BLDC GLUCOMTR-MCNC: 114 MG/DL (ref 70–130)
GLUCOSE BLDC GLUCOMTR-MCNC: 123 MG/DL (ref 70–130)
GLUCOSE BLDC GLUCOMTR-MCNC: 128 MG/DL (ref 70–130)
GLUCOSE BLDC GLUCOMTR-MCNC: 153 MG/DL (ref 70–130)
GLUCOSE SERPL-MCNC: 120 MG/DL (ref 65–99)
HCT VFR BLD AUTO: 29.3 % (ref 37.5–51)
HGB BLD-MCNC: 9.4 G/DL (ref 13–17.7)
MCH RBC QN AUTO: 27.1 PG (ref 26.6–33)
MCHC RBC AUTO-ENTMCNC: 32.1 G/DL (ref 31.5–35.7)
MCV RBC AUTO: 84.4 FL (ref 79–97)
PLATELET # BLD AUTO: 469 10*3/MM3 (ref 140–450)
PMV BLD AUTO: 10.3 FL (ref 6–12)
POTASSIUM SERPL-SCNC: 4.4 MMOL/L (ref 3.5–5.2)
RBC # BLD AUTO: 3.47 10*6/MM3 (ref 4.14–5.8)
SODIUM SERPL-SCNC: 139 MMOL/L (ref 136–145)
WBC # BLD AUTO: 10.42 10*3/MM3 (ref 3.4–10.8)

## 2020-10-12 PROCEDURE — 82962 GLUCOSE BLOOD TEST: CPT

## 2020-10-12 PROCEDURE — 97535 SELF CARE MNGMENT TRAINING: CPT

## 2020-10-12 PROCEDURE — 63710000001 INSULIN DETEMIR PER 5 UNITS: Performed by: FAMILY MEDICINE

## 2020-10-12 PROCEDURE — 63710000001 INSULIN LISPRO (HUMAN) PER 5 UNITS: Performed by: FAMILY MEDICINE

## 2020-10-12 PROCEDURE — 97161 PT EVAL LOW COMPLEX 20 MIN: CPT

## 2020-10-12 PROCEDURE — 80048 BASIC METABOLIC PNL TOTAL CA: CPT | Performed by: FAMILY MEDICINE

## 2020-10-12 PROCEDURE — 85027 COMPLETE CBC AUTOMATED: CPT | Performed by: INTERNAL MEDICINE

## 2020-10-12 RX ORDER — METOPROLOL TARTRATE 50 MG/1
50 TABLET, FILM COATED ORAL 2 TIMES DAILY
COMMUNITY

## 2020-10-12 RX ORDER — FERROUS SULFATE 325(65) MG
325 TABLET ORAL
COMMUNITY

## 2020-10-12 RX ORDER — CIPROFLOXACIN 500 MG/1
500 TABLET, FILM COATED ORAL 2 TIMES DAILY
COMMUNITY
Start: 2020-10-05 | End: 2020-10-14 | Stop reason: HOSPADM

## 2020-10-12 RX ORDER — ERGOCALCIFEROL 1.25 MG/1
50000 CAPSULE ORAL WEEKLY
COMMUNITY
End: 2020-10-14 | Stop reason: HOSPADM

## 2020-10-12 RX ORDER — METOPROLOL TARTRATE 50 MG/1
50 TABLET, FILM COATED ORAL EVERY 12 HOURS SCHEDULED
Status: DISCONTINUED | OUTPATIENT
Start: 2020-10-12 | End: 2020-10-14 | Stop reason: HOSPADM

## 2020-10-12 RX ADMIN — APIXABAN 5 MG: 5 TABLET, FILM COATED ORAL at 09:19

## 2020-10-12 RX ADMIN — NYSTATIN: 100000 POWDER TOPICAL at 09:23

## 2020-10-12 RX ADMIN — CLOPIDOGREL BISULFATE 75 MG: 75 TABLET, FILM COATED ORAL at 09:20

## 2020-10-12 RX ADMIN — PANTOPRAZOLE SODIUM 40 MG: 40 TABLET, DELAYED RELEASE ORAL at 09:19

## 2020-10-12 RX ADMIN — APIXABAN 5 MG: 5 TABLET, FILM COATED ORAL at 21:49

## 2020-10-12 RX ADMIN — SODIUM CHLORIDE 75 ML/HR: 9 INJECTION, SOLUTION INTRAVENOUS at 21:57

## 2020-10-12 RX ADMIN — SODIUM CHLORIDE 75 ML/HR: 9 INJECTION, SOLUTION INTRAVENOUS at 07:52

## 2020-10-12 RX ADMIN — SODIUM CHLORIDE, PRESERVATIVE FREE 10 ML: 5 INJECTION INTRAVENOUS at 21:49

## 2020-10-12 RX ADMIN — INSULIN DETEMIR 20 UNITS: 100 INJECTION, SOLUTION SUBCUTANEOUS at 09:19

## 2020-10-12 RX ADMIN — METOPROLOL TARTRATE 50 MG: 50 TABLET, FILM COATED ORAL at 21:49

## 2020-10-12 RX ADMIN — NICOTINE 1 PATCH: 21 PATCH, EXTENDED RELEASE TRANSDERMAL at 15:58

## 2020-10-12 RX ADMIN — INSULIN LISPRO 2 UNITS: 100 INJECTION, SOLUTION INTRAVENOUS; SUBCUTANEOUS at 17:07

## 2020-10-12 RX ADMIN — METOPROLOL SUCCINATE 50 MG: 50 TABLET, EXTENDED RELEASE ORAL at 09:19

## 2020-10-12 RX ADMIN — ATORVASTATIN CALCIUM 80 MG: 40 TABLET, FILM COATED ORAL at 09:20

## 2020-10-12 RX ADMIN — ACETAMINOPHEN 650 MG: 325 TABLET, FILM COATED ORAL at 23:58

## 2020-10-12 NOTE — PLAN OF CARE
Goal Outcome Evaluation:  Plan of Care Reviewed With: patient  Progress: no change  Outcome Summary: VSS. BLE edema remains. Pt more compliant this shift with keeping legs elevated in bed. Up to BSC with 2-3 people, safety maintained. Wounds remain open to air. Voiding without difficulty, clear yellow urine with good output. Will continue to monitor.

## 2020-10-12 NOTE — THERAPY TREATMENT NOTE
Acute Care - Occupational Therapy Treatment Note  [facility]     Patient Name: Manuel Clark  : 1955  MRN: [id number]  Today's Date: 10/12/2020             Admit Date: 10/9/2020       ICD-10-CM ICD-9-CM   1. MONI (acute kidney injury) (CMS/[facility])  N17.9 584.9   2. Cellulitis of lower extremity, unspecified laterality  L03.119 682.6   3. Acute deep vein thrombosis (DVT) of femoral vein of left lower extremity (CMS/[facility])  I82.412 453.41   4. Impaired mobility and ADLs  Z74.09 V49.89    Z78.9    5. Decreased activities of daily living (ADL)  Z78.9 V49.89   6. Impaired functional mobility, balance, gait, and endurance  Z74.09 V49.89     Patient Active Problem List   Diagnosis   • Neck pain   • Chronic tension-type headache, not intractable   • BMI 40.0-44.9, adult (CMS/[facility])   • Tobacco abuse   • Spinal stenosis in cervical region   • Numbness and tingling of left upper extremity   • Cubital tunnel syndrome on left   • Chronic bilateral low back pain without sciatica   • Acute renal failure (ARF) (CMS/[facility])   • Type 2 diabetes mellitus with hyperglycemia, with long-term current use of insulin (CMS/[facility])   • Diabetic neuropathy (CMS/[facility])   • Cellulitis of left leg   • Acute deep vein thrombosis (DVT) of femoral vein of left lower extremity (CMS/[facility])   • Essential hypertension   • Coronary artery disease with history of PCI and stent placement     Past Medical History:   Diagnosis Date   • Arthritis    • Diabetes mellitus (CMS/[facility])    • Diabetic neuropathy (CMS/[facility])    • Hypertension    • Nerve damage     L forearm/hand secondary to compound fx     Past Surgical History:   Procedure Laterality Date   • CARDIAC SURGERY      5 stents   • CARPAL TUNNEL RELEASE     • KNEE SURGERY Right    • OTHER SURGICAL HISTORY      compound fracture of the L arm          OT ASSESSMENT FLOWSHEET (last 12 hours)      OT Evaluation and Treatment     Row Name 10/12/20 1121                   OT Time and Intention    Subjective Information  no  complaints  -JJ (r) AC (t) JJ (c)        Document Type  therapy note (daily note)  -JJ (r) AC (t) JJ (c)        Mode of Treatment  occupational therapy  -J (r) AC (t) JJ (c)        Patient Effort  good  -J (r) AC (t) JJ (c)           General Information    Patient Profile Reviewed  yes  -J (r) AC (t) JJ (c)        Patient/Family/Caregiver Comments/Observations  no family present   -JJ (r) AC (t) JJ (c)        General Observations of Patient  Pt sitting EOB upon arrival   -J (r) AC (t) JJ (c)        Risks Reviewed  patient:;LOB;nausea/vomiting;dizziness;increased discomfort;change in vital signs;increased drainage;lines disloged  -J (r) AC (t) JJ (c)        Benefits Reviewed  patient:;improve function;increase independence;increase strength;increase balance;decrease pain;decrease risk of DVT;improve skin integrity;increase knowledge  - (r) AC (t) JJ (c)           Cognition    Orientation Status (Cognition)  oriented x 4  -J (r) AC (t) JJ (c)        Follows Commands (Cognition)  WFL  - (r) AC (t) JJ (c)        Cognitive Function (Cognitive)  L  - (r) AC (t) JJ (c)        Cognitive Interventions/Strategies  occupation/activity based interventions  - (r) AC (t) JJ (c)        Personal Safety Interventions  fall prevention program maintained;gait belt;muscle strengthening facilitated;supervised activity;nonskid shoes/slippers when out of bed  - (r) AC (t) JJ (c)           Pain Assessment    Additional Documentation  Pain Scale: Numbers Pre/Post-Treatment (Group)  -JJ (r) AC (t) JJ (c)           Pain Scale: Numbers Pre/Post-Treatment    Pretreatment Pain Rating  0/10 - no pain  -JJ (r) AC (t) JJ (c)        Posttreatment Pain Rating  0/10 - no pain  -J (r) AC (t) JJ (c)        Pain Intervention(s)  Medication (See MAR);Repositioned;Ambulation/increased activity  -JJ (r) AC (t) JJ (c)           Bed Mobility    Bed Mobility  scooting/bridging  -SAADIA (r) AISHA (t) SAADIA (c)        Scooting/Bridging [place] (Bed  Mobility)  verbal cues;minimum assist (75% patient effort);2 person assist  -JJ (r) AC (t) JJ (c)        Assistive Device (Bed Mobility)  bed rails  -JJ (r) AC (t) JJ (c)           Functional Mobility    Functional Mobility- Ind. Level  minimum assist (75% patient effort);2 person assist required;verbal cues required  -JJ (r) AC (t) JJ (c)        Functional Mobility- Device  rollator  -JJ (r) AC (t) JJ (c)        Functional Mobility- Comment  Pt amb from BSC to chair with Min Ax2 with vcs and using rollator.  -JJ (r) AC (t) JJ (c)           Transfer Assessment/Treatment    Transfers  sit-stand transfer;stand-sit transfer;toilet transfer  -JJ (r) AC (t) JJ (c)           Transfers    Sit-Stand [place] (Transfers)  minimum assist (75% patient effort);2 person assist;verbal cues  -JJ (r) AC (t) JJ (c)        Stand-Sit [place] (Transfers)  minimum assist (75% patient effort);2 person assist;verbal cues  -JJ (r) AC (t) JJ (c)        [place] Level (Toilet Transfer)  minimum assist (75% patient effort);2 person assist;verbal cues  -JJ (r) AC (t) JJ (c)        Assistive Device (Toilet Transfer)  commode, 3-in-1;walker, front-wheeled  -JJ (r) AC (t) JJ (c)           Sit-Stand Transfer    Assistive Device (Sit-Stand Transfers)  walker, 4-wheeled  -JJ (r) AC (t) JJ (c)           Stand-Sit Transfer    Assistive Device (Stand-Sit Transfers)  walker, 4-wheeled  -JJ (r) AC (t) JJ (c)           Toilet Transfer    Type (Toilet Transfer)  stand-sit;sit-stand  -JJ (r) AC (t) JJ (c)           Safety Issues, Functional Mobility    Safety Issues Affecting Function (Mobility)  at risk behavior observed;awareness of need for assistance;impulsivity;insight into deficits/self-awareness;judgment;safety precaution awareness;safety precautions follow-through/compliance  -JJ (r) AC (t) JJ (c)           Balance    Balance Assessment  sitting static balance;sitting dynamic balance;standing static balance;standing dynamic balance  -JJ  (r) AC (t) JJ (c)        Static Sitting Balance  WFL;sitting, edge of bed  -JJ (r) AC (t) JJ (c)        Dynamic Sitting Balance  WFL;sitting in chair  -JJ (r) AC (t) JJ (c)        Static Standing Balance  mild impairment;standing  -JJ (r) AC (t) JJ (c)        Dynamic Standing Balance  mild impairment;standing  -JJ (r) AC (t) JJ (c)        Balance Interventions  sitting;standing;static;dynamic;occupation based/functional task  -JJ (r) AC (t) JJ (c)           Activities of Daily Living    BADL Assessment/Intervention  lower body dressing;grooming;toileting  -JJ (r) AC (t) JJ (c)           Lower Body Dressing Assessment/Training    [place] Level (Lower Body Dressing)  moderate assist (50% patient effort);verbal cues;don;pants/bottoms  -JJ (r) AC (t) JJ (c)        Position (Lower Body Dressing)  edge of bed sitting  -JJ (r) AC (t) JJ (c)        Comment (Lower Body Dressing)  Pt required Mod A for donning shorts while sitting EOB.  -JJ (r) AC (t) JJ (c)           Grooming Assessment/Training    [place] Level (Grooming)  wash face, hands;set up;supervision applying deodorant  -JJ (r) AC (t) JJ (c)        Position (Grooming)  supported sitting  -JJ (r) AC (t) JJ (c)        Comment (Grooming)  Pt performed face washing and applying deodorant while sitting up in the chair with set up and supervision  -JJ (r) AC (t) JJ (c)           Toileting Assessment/Training    [place] Level (Toileting)  minimum assist (75% patient effort);verbal cues  -JJ (r) AC (t) JJ (c)        Assistive Devices (Toileting)  commode, 3-in-1  -JJ (r) AC (t) JJ (c)        Position (Toileting)  supported sitting  -JJ (r) AC (t) JJ (c)        Comment (Toileting)  Pt required Min A x2 to complete toileting on [facility]  -JJ (r) AC (t) JJ (c)           BADL Safety/Performance    Skilled BADL Treatment/Intervention  BADL process/adaptation training  -SAADIA (r) AISHA (t) SAADIA (christina)           Wound 10/09/20 7652 Right lower perineum Pressure Injury    Wound -  Properties Group Placement Date: 10/09/20  -OF Placement Time: 2254  -OF Present on Hospital Admission: Y  -OF Side: Right  -OF Orientation: lower  -OF Location: perineum  -OF Primary Wound Type: Pressure inj  -OF    Retired Wound - Properties Group Date first assessed: 10/09/20  -OF Time first assessed: 2254  -OF Present on Hospital Admission: Y  -OF Side: Right  -OF Location: perineum  -OF Primary Wound Type: Pressure inj  -OF       Wound 10/10/20 0237 Left distal leg    Wound - Properties Group Placement Date: 10/10/20  -OF Placement Time: 0237  -OF Present on Hospital Admission: Y  -OF Side: Left  -OF Orientation: distal  -OF Location: leg  -OF    Retired Wound - Properties Group Date first assessed: 10/10/20  -OF Time first assessed: 0237  -OF Present on Hospital Admission: Y  -OF Side: Left  -OF Location: leg  -OF       Wound 10/10/20 0255 Right medial thigh    Wound - Properties Group Placement Date: 10/10/20  -OF Placement Time: 0255  -OF Side: Right  -OF Orientation: medial  -OF Location: thigh  -OF    Retired Wound - Properties Group Date first assessed: 10/10/20  -OF Time first assessed: 0255  -OF Side: Right  -OF Location: thigh  -OF       Plan of Care Review    Plan of Care Reviewed With  patient  -SAADIA (brooklynn) AISHA (t) SAADIA (c)        Progress  improving  -SAADIA (brooklynn) AISHA (uma) SAADIA (c)        Outcome Summary  OT tx completed. Pt was A&Ox4. Pt stated he wanted to get dressed with nursing before beginning therapy. Nsg had to convince pt that therapy could assist in getting dressed at this time and pt agreed. Pt performed donning shorts while sitting EOB with Mod A. Pt educated on energy conservation techniques for LBD. Pt performed sit<>stand with Min Ax2 and amb to  BSC with Min Ax2 using rollator. Pt performed toileting with Min Ax2 and amb back to chair with min Ax2 using rollator. Pt performed face washing and applying deodorant while sitting up in the chair with set up and supervision. Continue OT POC.   -SAADIA (r) AC  (t) JJ (c)           Positioning and Restraints    Pre-Treatment Position  in bed  -JJ (r) AC (t) JJ (c)        Post Treatment Position  chair  -JJ (r) AC (t) JJ (c)        In Chair  sitting;reclined;call light within reach;encouraged to call for assist  -JJ (r) AC (t) JJ (c)           Progress Summary (OT)    Progress Toward Functional Goals (OT)  progress toward functional goals is good  -JJ (r) AC (t) JJ (c)        Daily Progress Summary (OT)  Continue OT POC   -JJ (r) AC (t) JJ (c)        Barriers to Overall Progress (OT)  fall/open wounds on BLE  -JJ (r) AC (t) JJ (c)           Therapy Plan Review/Discharge Plan (OT)    Anticipated Discharge Disposition (OT)  skilled nursing facility  -JJ (r) AC (t) JJ (c)          User Key  (r) = Recorded By, (t) = Taken By, (c) = Cosigned By    Initials Name Effective Dates    OF Rica Almeida RN 06/22/20 -     Francheska Quintana, OTR/L 07/05/20 -     Karyn Do, GERMAN Student 07/16/20 -          Occupational Therapy Education                 Title: PT OT SLP Therapies (In Progress)     Topic: Occupational Therapy (In Progress)     Point: ADL training (In Progress)     Description:   Instruct learner(s) on proper safety adaptation and remediation techniques during self care or transfers.   Instruct in proper use of assistive devices.              Learning Progress Summary           Patient Acceptance, E,D, VU,DU,NR by AC at 10/12/2020 1221    Acceptance, E, NR by MM at 10/11/2020 1609    Comment: OT role, benefits, POC, d/c planning   Family Acceptance, E, NR by MM at 10/11/2020 1609    Comment: OT role, benefits, POC, d/c planning                   Point: Home exercise program (Not Started)     Description:   Instruct learner(s) on appropriate technique for monitoring, assisting and/or progressing therapeutic exercises/activities.              Learner Progress:  Not documented in this visit.          Point: Precautions (In Progress)     Description:   Instruct  learner(s) on prescribed precautions during self-care and functional transfers.              Learning Progress Summary           Patient Acceptance, E, NR by MM at 10/11/2020 1609    Comment: OT role, benefits, POC, d/c planning   Family Acceptance, E, NR by MM at 10/11/2020 1609    Comment: OT role, benefits, POC, d/c planning                   Point: Body mechanics (In Progress)     Description:   Instruct learner(s) on proper positioning and spine alignment during self-care, functional mobility activities and/or exercises.              Learning Progress Summary           Patient Acceptance, E,D, VU,DU,NR by  at 10/12/2020 1221    Acceptance, E, NR by MM at 10/11/2020 1609    Comment: OT role, benefits, POC, d/c planning   Family Acceptance, E, NR by MM at 10/11/2020 1609    Comment: OT role, benefits, POC, d/c planning                               User Key     Initials Effective Dates Name Provider Type Discipline     07/05/20 -  Mainor Og, OTR/L Occupational Therapist OT     07/16/20 -  Karyn Ortega OT Student OT Student OT                  OT Recommendation and Plan     Progress Toward Functional Goals (OT): progress toward functional goals is good  Plan of Care Review  Plan of Care Reviewed With: patient  Progress: improving  Outcome Summary: OT tx completed. Pt was A&Ox4. Pt stated he wanted to get dressed with nursing before beginning therapy. Nsg had to convince pt that therapy could assist in getting dressed at this time and pt agreed. Pt performed donning shorts while sitting EOB with Mod A. Pt educated on energy conservation techniques for LBD. Pt performed sit<>stand with Min Ax2 and amb to  BSC with Min Ax2 using rollator. Pt performed toileting with Min Ax2 and amb back to chair with min Ax2 using rollator. Pt performed face washing and applying deodorant while sitting up in the chair with set up and supervision. Continue OT POC.   Plan of Care Reviewed With: patient  Outcome  Summary: OT tx completed. Pt was A&Ox4. Pt stated he wanted to get dressed with nursing before beginning therapy. Nsg had to convince pt that therapy could assist in getting dressed at this time and pt agreed. Pt performed donning shorts while sitting EOB with Mod A. Pt educated on energy conservation techniques for LBD. Pt performed sit<>stand with Min Ax2 and amb to  BSC with Min Ax2 using rollator. Pt performed toileting with Min Ax2 and amb back to chair with min Ax2 using rollator. Pt performed face washing and applying deodorant while sitting up in the chair with set up and supervision. Continue OT POC.         Time Calculation:   Time Calculation- OT     Row Name 10/12/20 1221             Time Calculation- OT    OT Start Time  1121  -JJ (r) AC (t) JJ (c)      OT Stop Time  1200  -JJ (r) AC (t) JJ (c)      OT Time Calculation (min)  39 min  -JJ (r) AC (t)      Total Timed Code Minutes- OT  39 minute(s)  -JJ (r) AC (t) JJ (c)      OT Received On  10/12/20  -JJ (r) AC (t) JJ (c)        User Key  (r) = Recorded By, (t) = Taken By, (c) = Cosigned By    Initials Name Provider Type    Francheska Quintana OTR/L Occupational Therapist    Karyn Do OT Student OT Student        Therapy Charges for Today     Code Description Service Date Service Provider Modifiers Qty    [id number] HC OT SELF CARE/MGMT/TRAIN EA 15 MIN 10/12/2020 Karyn Ortega OT Student GO 3               GERMAN Rai  10/12/2020

## 2020-10-12 NOTE — PLAN OF CARE
Problem: Adult Inpatient Plan of Care  Goal: Plan of Care Review  Outcome: Ongoing, Progressing  Flowsheets (Taken 10/12/2020 1526)  Outcome Summary: PT eval completed. Pt alert and oriented x4. Pt demonstrated impulsivity throughout session today and required frequent vcs to maintain safety. Pt was sitting EOB upon entry and required Min A to scoot closer to EOB in order to complete lower body dressing. He required Min A x2 to complete sit<>stand transfer using RW to maintain standing balance. Hw ambulated ~6 steps in room to get from BSC to chair, during which he demonstrated a shuffling gait pattern d/t decreased ankle dorsiflexion strength. Pt would benefit from skilled PT in order to address strength deficits and improve functional mobility. Recommend d/c to home with assist upon d/c from facility. (Pended)

## 2020-10-12 NOTE — PROGRESS NOTES
"INFECTIOUS DISEASES PROGRESS NOTE    Patient:  Manuel Clark  YOB: 1955  MRN: [id number]   Admit date: 10/9/2020   Admitting Physician: Salomon Garber DO  Primary Care Physician: Kathy Granger APRN    Chief Complaint: \" I have to go to the bathroom\"        Interval History: The patient was sitting at the edge of the recliner.  He states he called out \"at 1 and I really need to go now\"    I called and spoken with the nurse earlier about evaluating the patient's lower extremities.  We had decided to leave the exposed area yesterday.  She had reported the patient had been up in the chair and had not required her elevated his legs yet today.      Allergies:   Allergies   Allergen Reactions   • Iodine Unknown - High Severity   • Shellfish-Derived Products Anaphylaxis   • Sulfa Antibiotics Hives       Current Meds:     Current Facility-Administered Medications:   •  acetaminophen (TYLENOL) tablet 650 mg, 650 mg, Oral, Q4H PRN, Juliocesar Guerra MD  •  apixaban (ELIQUIS) tablet 5 mg, 5 mg, Oral, Q12H, Cornelius Carrasco DO, 5 mg at 10/12/20 0919  •  atorvastatin (LIPITOR) tablet 80 mg, 80 mg, Oral, Daily, Juliocesar Guerra MD, 80 mg at 10/12/20 0920  •  clopidogrel (PLAVIX) tablet 75 mg, 75 mg, Oral, Daily, Juliocesar Guerra MD, 75 mg at 10/12/20 0920  •  dextrose (D50W) 25 g/ 50mL Intravenous Solution 25 g, 25 g, Intravenous, Q15 Min PRN, Juliocesar Guerra MD  •  dextrose (GLUTOSE) oral gel 15 g, 15 g, Oral, Q15 Min PRN, Juliocesar Guerra MD  •  glucagon (human recombinant) (GLUCAGEN DIAGNOSTIC) injection 1 mg, 1 mg, Subcutaneous, Q15 Min PRN, Juliocesar Guerra MD  •  influenza vac split quad (FLUZONE,FLUARIX,AFLURIA,FLULAVAL) injection 0.5 mL, 0.5 mL, Intramuscular, During Hospitalization, Juliocesar Guerra MD  •  insulin detemir (LEVEMIR) injection 20 Units, 20 Units, Subcutaneous, Daily, Juliocesar Guerra MD, 20 Units at 10/12/20 0919  •  " "insulin lispro (humaLOG) injection 2-7 Units, 2-7 Units, Subcutaneous, TID ACMari Mariano Ariel, MD, 2 Units at 10/10/20 1644  •  metoprolol tartrate (LOPRESSOR) tablet 50 mg, 50 mg, Oral, Q12H, Salomon Garber DO  •  nicotine (NICODERM CQ) 21 MG/24HR patch 1 patch, 1 patch, Transdermal, Q24H, Cornelius Carrasco DO, 1 patch at 10/12/20 1558  •  nystatin (MYCOSTATIN) powder, , Topical, Q12H, Cornelius Carrasco DO  •  pantoprazole (PROTONIX) EC tablet 40 mg, 40 mg, Oral, QAM Mari LEONARD Mariano Ariel, MD, 40 mg at 10/12/20 0919  •  [COMPLETED] Insert peripheral IV, , , Once **AND** sodium chloride 0.9 % flush 10 mL, 10 mL, Intravenous, PRN, Shoulders, Tony Santana, APRN  •  sodium chloride 0.9 % flush 10 mL, 10 mL, Intravenous, Q12H, Juliocesar Guerra MD, 10 mL at 10/11/20 2043  •  sodium chloride 0.9 % infusion, 75 mL/hr, Intravenous, Continuous, Cornelius Carrasco DO, Last Rate: 75 mL/hr at 10/12/20 0752, 75 mL/hr at 10/12/20 0752  •  sodium chloride nasal spray 1 spray, 1 spray, Each Nare, PRN, Juliocesar Guerra MD, 1 spray at 10/10/20 0412      Review of Systems  General: No fever  Cutaneous: Lower extremity wounds    Objective     Vital Signs:  Temp (24hrs), Av.9 °F (36.6 °C), Min:97.3 °F (36.3 °C), Max:98.2 °F (36.8 °C)      /74 (BP Location: Right arm, Patient Position: Sitting)   Pulse 86   Temp 97.7 °F (36.5 °C) (Oral)   Resp 20   Ht 172.7 cm (67.99\")   Wt 116 kg (255 lb 11.7 oz)   SpO2 95%   BMI 38.89 kg/m²         Physical Exam   General: The patient is a morbidly obese male sitting up at the edge of the recliner with feet on the floor.  Abdomen: Morbidly obese  Lower extremities with no significant change in edema now.  Additionally there is still the deeper erythema noted of the left foot and lower leg when compared to the right.  Patient does have some cracked areas on the left lower leg and dried blood. -see photos  Neuro: Alert, oriented, speech clear  Psych: " Pleasant and cooperative                Results Review:    I reviewed the patient's new clinical results.    Lab Results:  CBC:   Lab Results   Lab 10/09/20  1422 10/10/20  0556 10/11/20  0247 10/12/20  0334   WBC 9.06 7.97 8.25 10.42   HEMOGLOBIN 10.0* 10.1* 10.0* 9.4*   HEMATOCRIT 31.4* 32.2* 32.0* 29.3*   PLATELETS 455* 466* 474* 469*         CMP:   Lab Results   Lab 10/09/20  1609 10/10/20  0556 10/11/20  0247 10/12/20  0334   SODIUM 135* 139 138 139   POTASSIUM 5.3* 4.9 4.2 4.4   CHLORIDE 102 104 106 106   CO2 21.0* 22.0 25.0 24.0   BUN 73* 71* 45* 21   CREATININE 2.91* 2.14* 1.28* 0.88   CALCIUM 9.6 10.0 9.9 9.5   BILIRUBIN 0.3  --   --   --    ALK PHOS 22*  --   --   --    ALT (SGPT) 17  --   --   --    AST (SGOT) 21  --   --   --    GLUCOSE 104* 131* 93 120*         Culture Results:    Blood Culture   Date Value Ref Range Status   10/09/2020 No growth at 2 days  Preliminary     Wound Culture   Date Value Ref Range Status   10/09/2020 Light growth (2+) Staphylococcus aureus, MRSA (A)  Preliminary     Comment:     Methicillin resistant Staphylococcus aureus, Patient may be an isolation risk.   10/09/2020 Scant growth (1+) Gram Negative Bacilli (A)  Preliminary   10/09/2020 Rare Gram Negative Bacilli (A)  Preliminary       Microbiology Results Abnormal     Procedure Component Value - Date/Time    Blood Culture With RONALDO - Blood, Arm, Left [[id number]] Collected: 10/09/20 1422    Lab Status: Preliminary result Specimen: Blood from Arm, Left Updated: 10/12/20 1445     Blood Culture No growth at 3 days    Wound Culture - Wound, Leg, Left [[id number]]  (Abnormal)  (Susceptibility) Collected: 10/09/20 1609    Lab Status: Preliminary result Specimen: Wound from Leg, Left Updated: 10/12/20 0912     Wound Culture Light growth (2+) Staphylococcus aureus, MRSA     Comment: Methicillin resistant Staphylococcus aureus, Patient may be an isolation risk.         Scant growth (1+) Achromobacter xylosoxidans      Rare Gram  Negative Bacilli     Gram Stain No WBCs or organisms seen    Susceptibility      Staphylococcus aureus, MRSA     RACHEL     Clindamycin Resistant     Erythromycin Resistant     Inducible Clindamycin Resistance Negative     Oxacillin Resistant     Penicillin G Resistant     Rifampin Susceptible     Tetracycline Resistant     Trimethoprim + Sulfamethoxazole Susceptible     Vancomycin Susceptible                Susceptibility      Achromobacter xylosoxidans     RACHEL     Cefepime Susceptible     Ceftazidime Susceptible     Gentamicin Resistant     Levofloxacin Intermediate     Meropenem Susceptible     Piperacillin + Tazobactam Susceptible     Tetracycline Resistant     Tobramycin Intermediate                    COVID PRE-OP / PRE-PROCEDURE SCREENING ORDER (NO ISOLATION) - Swab, Nasal Cavity [[id number]]  (Normal) Collected: 10/09/20 1424    Lab Status: Final result Specimen: Swab from Nasal Cavity Updated: 10/09/20 1518    Narrative:      The following orders were created for panel order COVID PRE-OP / PRE-PROCEDURE SCREENING ORDER (NO ISOLATION) - Swab, Nasal Cavity.  Procedure                               Abnormality         Status                     ---------                               -----------         ------                     COVID-19, [facility] IN-HOUS...[[id number]]  Normal              Final result                 Please view results for these tests on the individual orders.    COVID-19, [facility] IN-HOUSE,NP Swab (NO TRANSPORT MEDIA) 2 HR TAT - Swab, Nasal Cavity [[id number]]  (Normal) Collected: 10/09/20 1424    Lab Status: Final result Specimen: Swab from Nasal Cavity Updated: 10/09/20 1518     COVID19 Not Detected    Narrative:      Fact sheet for providers: https://www.fda.gov/media/172619/download     Fact sheet for patients: https://www.fda.gov/media/374834/download                Radiology:   Imaging Results (Last 72 Hours)     Procedure Component Value Units Date/Time    US Venous Doppler Lower Extremity  Bilateral (duplex) [[id number]] Collected: 10/11/20 0907     Updated: 10/11/20 0913    Narrative:      History: Swelling       Impression:      Impression: There is evidence of partially occlusive deep venous  thrombosis of the left superficial femoral vein. All other visualized  veins show no evidence of DVT.     Comments: Bilateral lower extremity venous duplex exam was performed  using color Doppler flow, Doppler waveform analysis, and grayscale  imaging, with and without compression. There is evidence of partially  occlusive DVT in a segment of the left superficial femoral vein.  Otherwise all visualized veins show no evidence of DVT. No thrombus is  identified in the saphenofemoral junctions and greater saphenous veins  bilaterally.         This report was finalized on 10/11/2020 09:10 by Dr. Michael Shaikh MD.          Assessment/Plan     Active Hospital Problems    Diagnosis   • **Cellulitis of left leg   • Essential hypertension   • Coronary artery disease with history of PCI and stent placement   • Acute renal failure (ARF) (CMS/[facility])   • Type 2 diabetes mellitus with hyperglycemia, with long-term current use of insulin (CMS/[facility])   • Diabetic neuropathy (CMS/[facility])   • Acute deep vein thrombosis (DVT) of femoral vein of left lower extremity (CMS/[facility])   • Chronic bilateral low back pain without sciatica   • BMI 40.0-44.9, adult (CMS/[facility])   • Tobacco abuse       IMPRESSION:  Chronic venous stasis changes in a morbidly obese male treated for cellulitis for the past several months.  Patient without chills or fever or leukocytosis so leaning more towards chronic venous stasis changes over acute on chronic cellulitis.  Very hard to evaluate with such edema and patient who will not allow compression.    Acute kidney injury-significantly improved      RECOMMENDATION:   -Elevate lower extremities above the level of the heart continues to be my primary recommendation.  I discussed this again with patient,     · Had noted  in consult I would not be opposed to stopping the Vanco and Zosyn and monitoring to see what the patient looks like clinically with his legs elevated.  Vanco Zosyn have been discontinued.  I think will be helpful to evaluate as his lower extremities look quite red when they are dependent and this may be just secondary to positioning      Also would recommend skin care regimen of his lower extremities and treatment of onychomycosis with Lamisil and monitoring per primary provider or wound care.      Danitza Hidalgo MD  10/12/20  16:43 CDT

## 2020-10-12 NOTE — PROGRESS NOTES
[facility] Medicine Services  INPATIENT PROGRESS NOTE    Length of Stay: 3  Date of Admission: 10/9/2020  Primary Care Physician: Kathy Granger APRN    Subjective     Chief Complaint:     Bilateral lower extremity weakness    HPI     The patient and I had a long discussion regarding his venous insufficiency and recurrent cellulitis of BLE.  After that discussion the patient seems willing to try gauze, Kerlix and overwrap's with Ace wraps for compression.  He refuses Unna boots as they become wet with serous drainage and peel away and fall apart.  Elevation of lower extremities was again emphasized.  He has agreed to referral to SNF.  The first facility to which referral was sent is no longer accepting new patients at present.  Referral to [facility] has been made and is pending.  I am doubtful that the patient's bilateral lower extremity rubor represents infection.  I suspect severe and prolonged venous insufficiency and venous incompetence is the origin of the redness.    Review of Systems     All pertinent negatives and positives are as above. All other systems have been reviewed and are negative unless otherwise stated.     Objective    Temp:  [97.3 °F (36.3 °C)-98.2 °F (36.8 °C)] 97.7 °F (36.5 °C)  Heart Rate:  [86-97] 86  Resp:  [20-22] 20  BP: (108-144)/(70-80) 108/74    Lab Results (last 24 hours)     Procedure Component Value Units Date/Time    Blood Culture With RONALDO - Blood, Arm, Left [[id number]] Collected: 10/09/20 1422    Specimen: Blood from Arm, Left Updated: 10/12/20 1445     Blood Culture No growth at 3 days    POC Glucose Once [[id number]]  (Normal) Collected: 10/12/20 1135    Specimen: Blood Updated: 10/12/20 1204     Glucose 128 mg/dL     Wound Culture - Wound, Leg, Left [[id number]]  (Abnormal)  (Susceptibility) Collected: 10/09/20 1609    Specimen: Wound from Leg, Left Updated: 10/12/20 0912     Wound Culture Light growth (2+) Staphylococcus aureus, MRSA      Comment: Methicillin resistant Staphylococcus aureus, Patient may be an isolation risk.         Scant growth (1+) Achromobacter xylosoxidans      Rare Gram Negative Bacilli     Gram Stain No WBCs or organisms seen    Susceptibility      Staphylococcus aureus, MRSA     RACHEL     Clindamycin Resistant     Erythromycin Resistant     Inducible Clindamycin Resistance Negative     Oxacillin Resistant     Penicillin G Resistant     Rifampin Susceptible     Tetracycline Resistant     Trimethoprim + Sulfamethoxazole Susceptible     Vancomycin Susceptible                Susceptibility      Achromobacter xylosoxidans     RACHEL     Cefepime Susceptible     Ceftazidime Susceptible     Gentamicin Resistant     Levofloxacin Intermediate     Meropenem Susceptible     Piperacillin + Tazobactam Susceptible     Tetracycline Resistant     Tobramycin Intermediate                    POC Glucose Once [[id number]]  (Normal) Collected: 10/12/20 0801    Specimen: Blood Updated: 10/12/20 0812     Glucose 114 mg/dL     Basic Metabolic Panel [[id number]]  (Abnormal) Collected: 10/12/20 0334    Specimen: Blood Updated: 10/12/20 0427     Glucose 120 mg/dL      BUN 21 mg/dL      Creatinine 0.88 mg/dL      Sodium 139 mmol/L      Potassium 4.4 mmol/L      Chloride 106 mmol/L      CO2 24.0 mmol/L      Calcium 9.5 mg/dL      eGFR Non African Amer 87 mL/min/1.73      BUN/Creatinine Ratio 23.9     Anion Gap 9.0 mmol/L     Narrative:      GFR Normal >60  Chronic Kidney Disease <60  Kidney Failure <15      CBC (No Diff) [[id number]]  (Abnormal) Collected: 10/12/20 0334    Specimen: Blood Updated: 10/12/20 0410     WBC 10.42 10*3/mm3      RBC 3.47 10*6/mm3      Hemoglobin 9.4 g/dL      Hematocrit 29.3 %      MCV 84.4 fL      MCH 27.1 pg      MCHC 32.1 g/dL      RDW 18.8 %      RDW-SD 57.2 fl      MPV 10.3 fL      Platelets 469 10*3/mm3     POC Glucose Once [[id number]]  (Abnormal) Collected: 10/11/20 2011    Specimen: Blood Updated: 10/11/20 2023     Glucose 193  mg/dL     POC Glucose Once [[id number]]  (Normal) Collected: 10/11/20 1650    Specimen: Blood Updated: 10/11/20 1701     Glucose 124 mg/dL           Imaging Results (Last 24 Hours)     ** No results found for the last 24 hours. **             Intake/Output Summary (Last 24 hours) at 10/12/2020 1552  Last data filed at 10/12/2020 1248  Gross per 24 hour   Intake 1140 ml   Output 450 ml   Net 690 ml       Physical Exam  Constitutional:       Appearance: He is well-developed.      Comments: Sitting up in on the side of the bed with both legs dangling in the floor.  No family present.   HENT:      Head: Normocephalic and atraumatic.   Eyes:      Conjunctiva/sclera: Conjunctivae normal.      Pupils: Pupils are equal, round, and reactive to light.   Neck:      Musculoskeletal: Neck supple.      Vascular: No JVD.   Cardiovascular:      Rate and Rhythm: Normal rate and regular rhythm.      Heart sounds: Normal heart sounds. No murmur. No friction rub. No gallop.    Pulmonary:      Effort: Pulmonary effort is normal. No respiratory distress.      Breath sounds: Normal breath sounds. No wheezing or rales.   Chest:      Chest wall: No tenderness.   Abdominal:      General: Bowel sounds are normal. There is no distension.      Palpations: Abdomen is soft.      Tenderness: There is no abdominal tenderness. There is no guarding or rebound.   Musculoskeletal: Normal range of motion.         General: Swelling (L>R) present. No tenderness or deformity.   Skin:     General: Skin is warm and dry.  There is a small amount of serous fluid drainage noted from the left lower extremity in the areas of skin breakdown anteriorly and posteriorly below the knee.     Findings: Erythema present bilaterally, left greater than right. No rash.      Comments: Legs are undressed.  Neurological:      Mental Status: He is alert and oriented to person, place, and time.      Cranial Nerves: No cranial nerve deficit.      Motor: No abnormal muscle tone.       Deep Tendon Reflexes: Reflexes normal.   Psychiatric:      Comments: He is conversant.       Results Review:  I have reviewed the labs, radiology results, and diagnostic studies since my last progress note and made treatment changes reflective of the results.   I have reviewed the current medications.    Assessment/Plan     Active Hospital Problems    Diagnosis   • **Cellulitis of left leg   • Essential hypertension   • Coronary artery disease with history of PCI and stent placement   • Acute renal failure (ARF) (CMS/[facility])   • Type 2 diabetes mellitus with hyperglycemia, with long-term current use of insulin (CMS/[facility])   • Diabetic neuropathy (CMS/[facility])   • Acute deep vein thrombosis (DVT) of femoral vein of left lower extremity (CMS/[facility])   • Chronic bilateral low back pain without sciatica   • BMI 40.0-44.9, adult (CMS/[facility])   • Tobacco abuse       PLAN:  Kerlix, gauze and Ace overwrap for compression  Agree with holding IV antibiotics as there does not appear to be evidence of active infection  Elevate legs, elevate legs, elevate legs.    Electronically signed by Salomon Garber DO, 10/12/20, 15:52 CDT.

## 2020-10-12 NOTE — DISCHARGE PLACEMENT REQUEST
"From:  Zulema Wang, BSW  [phone]    ClarkManuel (65 y.o. Male)     Date of Birth Social Security Number Address Home Phone MRN    1955  [address] [place] [address] [phone] [id number]    Muslim Marital Status          Other        Admission Date Admission Type Admitting Provider Attending Provider Department, Room/Bed    10/9/20 Emergency Salomon Garber DO Robinson, Maurice S, DO [facility] 4B, 408/1    Discharge Date Discharge Disposition Discharge Destination                       Attending Provider: Salomon Garber DO    Allergies: Iodine, Shellfish-derived Products, Sulfa Antibiotics    Isolation: None   Infection: MRSA (05/05/20)   Code Status: CPR    Ht: 172.7 cm (67.99\")   Wt: 116 kg (255 lb 11.7 oz)    Admission Cmt: None   Principal Problem: Cellulitis of left leg [L03.116]                 Active Insurance as of 10/9/2020     Primary Coverage     Payor Plan Insurance Group Employer/Plan Group    MEDICARE MEDICARE A & B      Payor Plan Address Payor Plan Phone Number Payor Plan Fax Number Effective Dates    [address] [phone]  1/1/2017 - None Entered    [facility] [id number]       Subscriber Name Subscriber Birth Date Member ID       MANUEL CLARK 1955 [id number]           Secondary Coverage     Payor Plan Insurance Group Employer/Plan Group    MISC COMMERCIAL MISC COMMERCIAL      Coverage Address Coverage Phone Number Coverage Fax Number Effective Dates    [address] [phone]  1/1/2018 - None Entered    [facility] [id number]       Subscriber Name Subscriber Birth Date Member ID       MANUEL CLARK 1955 [id number]                 Emergency Contacts      (Rel.) Home Phone Work Phone Mobile Phone    Jayleen Clark (Spouse) [phone] -- [phone]               History & Physical      PolidoJuliocesar mcclendon MD at 10/09/20 1927              [facility]  HISTORY " AND PHYSICAL    Date of Admission: 10/9/2020  Primary Care Physician: Kathy Granger APRN    Subjective     Chief Complaint: left leg redness and pain    History of Present Illness  65 year old male with PMH of DM ID, CAD, HTN, neuropathy,  Left sided weqakness, chronic leg edema that presents with complaints of worsening redness, swelling and pain over left leg. He has had cellulitis for several months, under wound care management and has been prescribed clindamycin and cipro recently, but continues to worsen. He has had MRSA skin infection in right toe in the past.    Doppler studies in ER was preliminarily reported as femoral vein thrombosis.     Review of Systems   Otherwise complete ROS reviewed and negative except as mentioned in the HPI.    Past Medical History:   Past Medical History:   Diagnosis Date   • Arthritis    • Diabetes mellitus (CMS/HCC)    • Diabetic neuropathy (CMS/HCC)    • Hypertension    • Nerve damage     L forearm/hand secondary to compound fx     Past Surgical History:  Past Surgical History:   Procedure Laterality Date   • CARDIAC SURGERY      5 stents   • CARPAL TUNNEL RELEASE     • KNEE SURGERY Right    • OTHER SURGICAL HISTORY      compound fracture of the L arm     Social History:  reports that he has been smoking. He has been smoking about 0.50 packs per day. He has never used smokeless tobacco. He reports current alcohol use. He reports that he does not use drugs.    Family History: family history includes Arthritis in his father; Cancer in his mother; Diabetes in his mother; Heart disease in his father and mother; Hypertension in his father and mother; Stroke in his mother.       Allergies:  Allergies   Allergen Reactions   • Iodine Unknown - High Severity   • Shellfish-Derived Products Anaphylaxis   • Sulfa Antibiotics Hives     Medications:  Prior to Admission medications    Medication Sig Start Date End Date Taking? Authorizing Provider   aspirin (ECOTRIN LOW STRENGTH) 81 MG  EC tablet Take 81 mg by mouth.    Kenn Spears MD   atorvastatin (LIPITOR) 80 MG tablet Take 80 mg by mouth.    eKnn Spears MD   cefdinir (OMNICEF) 300 MG capsule  5/7/20   Kenn Spears MD   clopidogrel (PLAVIX) 75 MG tablet Take 75 mg by mouth.    Kenn Spears MD   Empagliflozin (Jardiance) 10 MG tablet 10 mg Daily. 3/18/20   Kenn Spears MD   fenofibric acid (TRILIPIX) 135 MG capsule delayed-release delayed release capsule Take 135 mg by mouth Daily. 11/25/19   Kenn Spears MD   Insulin Glargine (LANTUS SOLOSTAR) 100 UNIT/ML injection pen Inject 50 Units under the skin into the appropriate area as directed 2 (Two) Times a Day.    Kenn Spears MD   metoprolol succinate XL (TOPROL-XL) 50 MG 24 hr tablet Take 50 mg by mouth.    Kenn Spears MD   Multiple Vitamins-Minerals (MULTIVITAMIN WITH MINERALS) tablet tablet Take 1 tablet by mouth Daily.    Kenn Spears MD   omega-3 acid ethyl esters (LOVAZA) 1 g capsule Take 1 g by mouth 4 (Four) Times a Day. 12/16/19   Kenn Spears MD   omeprazole (priLOSEC) 20 MG capsule Take 20 mg by mouth Daily.    Kenn Spears MD   ONE TOUCH ULTRA TEST test strip CHECK BLOOD SUGAR 3 TIMES A DAY *E11.9* 11/25/19   Kenn Spears MD   pentoxifylline (TRENtal) 400 MG CR tablet Take 400 mg by mouth 3 (Three) Times a Day. 4/19/20   Kenn Spears MD   potassium chloride (K-DUR,KLOR-CON) 20 MEQ CR tablet Take 40 mEq by mouth.    Kenn Spears MD   pregabalin (LYRICA) 75 MG capsule Take 75 mg by mouth.    Kenn Spears MD   REGRANEX 0.01 % gel APPLY THIN LAYER TO AFFECTED AREA ONCE DAILY EVERY 12 HOURS ON, 12 HOURS OFF 4/9/20   Kenn Spears MD   SITagliptin-MetFORMIN HCl ER (JANUMET XR)  MG tablet Take 1 tablet by mouth.    Kenn Spears MD   spironolactone (ALDACTONE) 25 MG tablet Take 50 mg by mouth.    Kenn Spears MD   tiZANidine  "(ZANAFLEX) 4 MG tablet Take 4 mg by mouth 3 (Three) Times a Day As Needed. 12/16/19   Kenn Spears MD   triamterene-hydrochlorothiazide (DYAZIDE) 37.5-25 MG per capsule Take 1 capsule by mouth.    Kenn Spears MD   valsartan (DIOVAN) 80 MG tablet Take 80 mg by mouth.    Kenn Spears MD   clotrimazole-betamethasone (LOTRISONE) 1-0.05 % cream  5/4/20 10/9/20  Kenn Spears MD     Objective     Vital Signs: /64 (BP Location: Left arm, Patient Position: Sitting)   Pulse 86   Temp 97.7 °F (36.5 °C) (Oral)   Resp 16   Ht 172.7 cm (67.99\")   Wt 120 kg (264 lb)   SpO2 94%   BMI 40.15 kg/m²   Physical Exam  Constitutional:       General: He is not in acute distress.     Appearance: He is obese. He is not toxic-appearing or diaphoretic.      Comments: Skin dry, mucosa pasty   HENT:      Head: Normocephalic and atraumatic.      Right Ear: External ear normal.      Left Ear: External ear normal.      Nose: Nose normal.      Mouth/Throat:      Mouth: Mucous membranes are dry.      Pharynx: No oropharyngeal exudate.   Eyes:      General:         Right eye: No discharge.         Left eye: No discharge.      Extraocular Movements: Extraocular movements intact.      Pupils: Pupils are equal, round, and reactive to light.   Neck:      Musculoskeletal: Normal range of motion and neck supple. No neck rigidity or muscular tenderness.   Cardiovascular:      Rate and Rhythm: Normal rate and regular rhythm.      Pulses: Normal pulses.   Pulmonary:      Effort: Pulmonary effort is normal. No respiratory distress.      Breath sounds: Normal breath sounds. No stridor. No wheezing or rhonchi.   Abdominal:      General: Abdomen is flat. There is no distension.      Palpations: Abdomen is soft. There is no mass.      Tenderness: There is no abdominal tenderness.      Hernia: No hernia is present.   Musculoskeletal: Normal range of motion.      Right lower leg: Edema present.      Left lower leg: " Edema present.   Skin:     General: Skin is dry.      Capillary Refill: Capillary refill takes less than 2 seconds.      Coloration: Skin is not jaundiced.      Comments: Left leg with erythema from foot to upper leg, streaking area going up into thigh. Right leg shows also chronic erythema. There are some open areas in leg with minimal oozing.   Neurological:      Mental Status: He is alert. Mental status is at baseline.      Cranial Nerves: No cranial nerve deficit.      Coordination: Coordination normal.   Psychiatric:         Mood and Affect: Mood normal.         Behavior: Behavior normal.     Results Reviewed:  Lab Results (last 24 hours)     Procedure Component Value Units Date/Time    aPTT [[id number]]  (Abnormal) Collected: 10/09/20 1901    Specimen: Blood Updated: 10/09/20 1917     PTT 35.2 seconds     Comprehensive Metabolic Panel [[id number]]  (Abnormal) Collected: 10/09/20 1609    Specimen: Blood Updated: 10/09/20 1634     Glucose 104 mg/dL      BUN 73 mg/dL      Creatinine 2.91 mg/dL      Sodium 135 mmol/L      Potassium 5.3 mmol/L      Chloride 102 mmol/L      CO2 21.0 mmol/L      Calcium 9.6 mg/dL      Total Protein 7.3 g/dL      Albumin 3.40 g/dL      ALT (SGPT) 17 U/L      AST (SGOT) 21 U/L      Alkaline Phosphatase 22 U/L      Total Bilirubin 0.3 mg/dL      eGFR Non African Amer 22 mL/min/1.73      Globulin 3.9 gm/dL      A/G Ratio 0.9 g/dL      BUN/Creatinine Ratio 25.1     Anion Gap 12.0 mmol/L     Narrative:      GFR Normal >60  Chronic Kidney Disease <60  Kidney Failure <15      Wound Culture - Wound, Leg, Left [[id number]] Collected: 10/09/20 1609    Specimen: Wound from Leg, Left Updated: 10/09/20 1614    COVID PRE-OP / PRE-PROCEDURE SCREENING ORDER (NO ISOLATION) - Swab, Nasal Cavity [[id number]]  (Normal) Collected: 10/09/20 1424    Specimen: Swab from Nasal Cavity Updated: 10/09/20 1518    Narrative:      The following orders were created for panel order COVID PRE-OP / PRE-PROCEDURE SCREENING  ORDER (NO ISOLATION) - Swab, Nasal Cavity.  Procedure                               Abnormality         Status                     ---------                               -----------         ------                     COVID-19, ABBOTT IN-HOUS...[[id number]]  Normal              Final result                 Please view results for these tests on the individual orders.    COVID-19, ABBOTT IN-HOUSE,NP Swab (NO TRANSPORT MEDIA) 2 HR TAT - Swab, Nasal Cavity [[id number]]  (Normal) Collected: 10/09/20 1424    Specimen: Swab from Nasal Cavity Updated: 10/09/20 1518     COVID19 Not Detected    Narrative:      Fact sheet for providers: https://www.fda.gov/media/237283/download     Fact sheet for patients: https://www.fda.gov/media/564320/download    Lactic Acid, Plasma [[id number]]  (Normal) Collected: 10/09/20 1422    Specimen: Blood Updated: 10/09/20 1449     Lactate 1.4 mmol/L     Blood Culture With RONALDO - Blood, Arm, Left [[id number]] Collected: 10/09/20 1422    Specimen: Blood from Arm, Left Updated: 10/09/20 1442    Protime-INR [[id number]]  (Abnormal) Collected: 10/09/20 1422    Specimen: Blood Updated: 10/09/20 1441     Protime 13.9 Seconds      INR 1.11    aPTT [[id number]]  (Normal) Collected: 10/09/20 1422    Specimen: Blood Updated: 10/09/20 1441     PTT 32.2 seconds     CBC & Differential [[id number]]  (Abnormal) Collected: 10/09/20 1422    Specimen: Blood Updated: 10/09/20 1432    Narrative:      The following orders were created for panel order CBC & Differential.  Procedure                               Abnormality         Status                     ---------                               -----------         ------                     CBC Auto Differential[[id number]]        Abnormal            Final result                 Please view results for these tests on the individual orders.    CBC Auto Differential [[id number]]  (Abnormal) Collected: 10/09/20 1422    Specimen: Blood Updated: 10/09/20 1432     WBC 9.06  10*3/mm3      RBC 3.68 10*6/mm3      Hemoglobin 10.0 g/dL      Hematocrit 31.4 %      MCV 85.3 fL      MCH 27.2 pg      MCHC 31.8 g/dL      RDW 19.4 %      RDW-SD 60.7 fl      MPV 10.6 fL      Platelets 455 10*3/mm3      Neutrophil % 76.7 %      Lymphocyte % 10.0 %      Monocyte % 10.0 %      Eosinophil % 1.0 %      Basophil % 0.3 %      Immature Grans % 2.0 %      Neutrophils, Absolute 6.94 10*3/mm3      Lymphocytes, Absolute 0.91 10*3/mm3      Monocytes, Absolute 0.91 10*3/mm3      Eosinophils, Absolute 0.09 10*3/mm3      Basophils, Absolute 0.03 10*3/mm3      Immature Grans, Absolute 0.18 10*3/mm3      nRBC 0.0 /100 WBC         Imaging Results (Last 24 Hours)     Procedure Component Value Units Date/Time    US Venous Doppler Lower Extremity Bilateral (duplex) [[id number]] Resulted: 10/09/20 1657     Updated: 10/09/20 1702    CT Head Without Contrast [[id number]] Collected: 10/09/20 1505     Updated: 10/09/20 1524    Narrative:      EXAMINATION:  CT HEAD WO CONTRAST-  10/9/2020 2:47 PM CDT     HISTORY: Mental status change, unknown cause. The patient had difficulty  holding still for the examination.     TECHNIQUE: Multiple axial images were obtained through the brain without  contrast infusion. Multiplanar images were reconstructed.     DLP: 1674 mGy-cm. Automated dosage control was utilized.     COMPARISON: No comparison study.     FINDINGS: There is mild motion artifact. There are no hemorrhage, edema  or mass effect. 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 The ventricular system is  nondilated. The visualized paranasal sinuses are clear. The mastoid air  cells are clear. No calvarial fracture is seen.       Impression:      No hemorrhage, edema or mass effect. No acute findings.     The full report of this exam was immediately signed and available to the  emergency room. The patient is currently in the emergency room.     This report was finalized on 10/09/2020 15:07 by Dr. Salinas Morgan MD.    XR Tibia Fibula 2 View  Right [[id number]] Collected: 10/09/20 1453     Updated: 10/09/20 1503    Narrative:         Exam:   XR TIBIA FIBULA 2 VW RIGHT-       Date:  10/9/2020      History:  Male, age  65 years;redness, swelling     COMPARISON:  None.     Findings :     There is no acute displaced fracture. No dislocation. No suspicious  lytic or blastic lesion. No radiopaque foreign body.     Vascular calcifications.          Impression:      Impression:     No acute osseous pathology.     This report was finalized on 10/09/2020 14:54 by Dr. Enedina Parmar MD.    XR Tibia Fibula 2 View Left [[id number]] Collected: 10/09/20 1453     Updated: 10/09/20 1503    Narrative:      EXAMINATION:  XR TIBIA FIBULA 2 VW LEFT-  10/9/2020 2:29 PM CDT     HISTORY: Redness and swelling.     COMPARISON: No comparison study.     TECHNIQUE: 2 views were obtained.     FINDINGS:  The left tibia and fibula are intact. Vascular calcification  is noted. There is degenerative osteoarthritis of the left knee. There  is subcutaneous edema throughout the visualized lower leg.       Impression:      1. No acute bony abnormality of the tibia or fibula.  2. Degenerative osteoarthritis left knee.  3. Diffuse subcutaneous edema within the soft tissues. Vascular  calcification.     This report was finalized on 10/09/2020 14:54 by Dr. Salinas Morgan MD.    XR Foot 3+ View Right [[id number]] Collected: 10/09/20 1454     Updated: 10/09/20 1502    Narrative:         Exam:   XR FOOT 3+ VW RIGHT-       Date:  10/9/2020      History:  Male, age  65 years;redness, swelling     COMPARISON:  None.     Findings :  Mild nonspecific soft tissue swelling. 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     The Lisfranc joint spacing is within normal limits. No acute finding at  the base of fifth metatarsal bone. Degenerative changes in the foot  distally.        There is no acute displaced fracture. No dislocation. No suspicious  lytic or blastic lesion. No radiopaque foreign body.           Impression:      Impression:     Soft tissue swelling and prior radiation of the first digit, without  evidence of acute osteomyelitis..     This report was finalized on 10/09/2020 14:55 by Dr. Enedina Parmar MD.        I have personally reviewed and interpreted the radiology studies and ECG obtained at time of admission.     Assessment / Plan     Assessment:   Active Hospital Problems    Diagnosis   • MONI (acute kidney injury) (CMS/[facility])   • Diabetes mellitus (CMS/[facility])   • Diabetic neuropathy (CMS/[facility])   • Cellulitis of left leg   • Acute deep vein thrombosis (DVT) of femoral vein of left lower extremity (CMS/[facility])   • Left-sided weakness   • Chronic bilateral low back pain without sciatica   • BMI 40.0-44.9, adult (CMS/[facility])     Plan:   Admit to telemetry, vitals every 4 hours  Cardiac/diabetic diet    IVF  cc/hour, follow BMP  Renal US  Hold ACE/ARB and HCTZ. Follow potassium level closely    Continue Zosyn/Vancomycin  ID consult due to MRSA skin infection history and patient request  Medications reconciled    DVT > Lovenox 1 mg/kg daily    Levemir daily, lower dose than home listed  A1C check  Low dose humalog sliding scale    Code Status: Full     I discussed the patient's findings and my recommendations with the patient    Estimated length of stay over 2 midnights    Patient seen and examined by me on 10/09/2020 at 2005.    Electronically signed by Juliocesar Guerra MD, 10/09/20, 21:30 CDT.              Electronically signed by Juliocesar Guerra MD at 10/09/20 2131       Prior to Admission Medications     Prescriptions Last Dose Informant Patient Reported? Taking?    aspirin (ECOTRIN LOW STRENGTH) 81 MG EC tablet   Yes No    Take 81 mg by mouth.    atorvastatin (LIPITOR) 80 MG tablet   Yes No    Take 80 mg by mouth.    cefdinir (OMNICEF) 300 MG capsule   Yes No    clopidogrel (PLAVIX) 75 MG tablet   Yes No    Take 75 mg by mouth.    Empagliflozin (Jardiance) 10 MG tablet   Yes No    10 mg  Daily.    fenofibric acid (TRILIPIX) 135 MG capsule delayed-release delayed release capsule   Yes No    Take 135 mg by mouth Daily.    Insulin Glargine (LANTUS SOLOSTAR) 100 UNIT/ML injection pen   Yes No    Inject 50 Units under the skin into the appropriate area as directed 2 (Two) Times a Day.    metoprolol succinate XL (TOPROL-XL) 50 MG 24 hr tablet   Yes No    Take 50 mg by mouth.    Multiple Vitamins-Minerals (MULTIVITAMIN WITH MINERALS) tablet tablet   Yes No    Take 1 tablet by mouth Daily.    omega-3 acid ethyl esters (LOVAZA) 1 g capsule   Yes No    Take 1 g by mouth 4 (Four) Times a Day.    omeprazole (priLOSEC) 20 MG capsule   Yes No    Take 20 mg by mouth Daily.    ONE TOUCH ULTRA TEST test strip   Yes No    CHECK BLOOD SUGAR 3 TIMES A DAY *E11.9*    pentoxifylline (TRENtal) 400 MG CR tablet   Yes No    Take 400 mg by mouth 3 (Three) Times a Day.    potassium chloride (K-DUR,KLOR-CON) 20 MEQ CR tablet   Yes No    Take 40 mEq by mouth.    pregabalin (LYRICA) 75 MG capsule   Yes No    Take 75 mg by mouth.    REGRANEX 0.01 % gel   Yes No    APPLY THIN LAYER TO AFFECTED AREA ONCE DAILY EVERY 12 HOURS ON, 12 HOURS OFF    SITagliptin-MetFORMIN HCl ER (JANUMET XR)  MG tablet   Yes No    Take 1 tablet by mouth.    spironolactone (ALDACTONE) 25 MG tablet   Yes No    Take 50 mg by mouth.    tiZANidine (ZANAFLEX) 4 MG tablet   Yes No    Take 4 mg by mouth 3 (Three) Times a Day As Needed.    triamterene-hydrochlorothiazide (DYAZIDE) 37.5-25 MG per capsule   Yes No    Take 1 capsule by mouth.    valsartan (DIOVAN) 80 MG tablet   Yes No    Take 80 mg by mouth.          Current Facility-Administered Medications   Medication Dose Route Frequency Provider Last Rate Last Dose   • acetaminophen (TYLENOL) tablet 650 mg  650 mg Oral Q4H PRN Juliocesar Guerra MD       • apixaban (ELIQUIS) tablet 5 mg  5 mg Oral Q12H Cornelius Carrasco DO   5 mg at 10/11/20 2043   • atorvastatin (LIPITOR) tablet 80 mg  80 mg Oral  Daily Juliocesar Guerra MD   80 mg at 10/11/20 0932   • clopidogrel (PLAVIX) tablet 75 mg  75 mg Oral Daily Juliocesar Guerra MD   75 mg at 10/11/20 0932   • dextrose (D50W) 25 g/ 50mL Intravenous Solution 25 g  25 g Intravenous Q15 Min PRN Juliocesar Guerra MD       • dextrose (GLUTOSE) oral gel 15 g  15 g Oral Q15 Min PRN Juliocesar Guerra MD       • glucagon (human recombinant) (GLUCAGEN DIAGNOSTIC) injection 1 mg  1 mg Subcutaneous Q15 Min PRN Juliocesar Guerra MD       • influenza vac split quad (FLUZONE,FLUARIX,AFLURIA,FLULAVAL) injection 0.5 mL  0.5 mL Intramuscular During Hospitalization Juliocesar Guerra MD       • insulin detemir (LEVEMIR) injection 20 Units  20 Units Subcutaneous Daily Juliocesar Guerra MD   20 Units at 10/11/20 0932   • insulin lispro (humaLOG) injection 2-7 Units  2-7 Units Subcutaneous TID AC Juliocesar Guerra MD   2 Units at 10/10/20 1644   • metoprolol succinate XL (TOPROL-XL) 24 hr tablet 50 mg  50 mg Oral Daily Juliocesar Guerra MD   50 mg at 10/11/20 0932   • nicotine (NICODERM CQ) 21 MG/24HR patch 1 patch  1 patch Transdermal Q24H Cornelius Carrasco DO   1 patch at 10/11/20 1649   • nystatin (MYCOSTATIN) powder   Topical Q12H Cornelius Carrasco DO       • pantoprazole (PROTONIX) EC tablet 40 mg  40 mg Oral QAM AC Juliocesar Guerra MD   40 mg at 10/11/20 0932   • sodium chloride 0.9 % flush 10 mL  10 mL Intravenous PRN Tony Hameed APRN       • sodium chloride 0.9 % flush 10 mL  10 mL Intravenous Q12H Juliocesar Guerra MD   10 mL at 10/11/20 2043   • sodium chloride 0.9 % flush 10 mL  10 mL Intravenous PRN Juliocesar Guerra MD       • sodium chloride 0.9 % infusion  75 mL/hr Intravenous Continuous Cornelius Carrasco DO 75 mL/hr at 10/12/20 0752 75 mL/hr at 10/12/20 0752   • sodium chloride nasal spray 1 spray  1 spray Each Nare Juliocesar Cruz MD   1 spray at 10/10/20 0413      "    Operative/Procedure Notes (most recent note)    No notes of this type exist for this encounter.            Physician Progress Notes (most recent note)      Danitza Hidalgo MD at 10/11/20 1840          INFECTIOUS DISEASES PROGRESS NOTE    Patient:  Manuel Clark  YOB: 1955  MRN: [id number]   Admit date: 10/9/2020   Admitting Physician: Cornelius Carrasco DO  Primary Care Physician: Kathy Granger APRN    Chief Complaint: \"When can I go home\"        Interval History: The patient was lying sideways across the bed while nursing staff was trying to save the patient.  His wife was at bedside.    The patient reported he has been elevating his legs some.  When I discussed what he would need to do to get home and then what he would need to do once he got there, his wife checked in a few times that he stays up in his motorized chair with his feet down front of the computer.    He does not get in a recliner or get in the bed and elevate his lower extremities.    Allergies:   Allergies   Allergen Reactions   • Iodine Unknown - High Severity   • Shellfish-Derived Products Anaphylaxis   • Sulfa Antibiotics Hives       Current Meds:     Current Facility-Administered Medications:   •  acetaminophen (TYLENOL) tablet 650 mg, 650 mg, Oral, Q4H PRN, Juliocesar Guerra MD  •  apixaban (ELIQUIS) tablet 5 mg, 5 mg, Oral, Q12H, Cornelius Carrasco DO, 5 mg at 10/11/20 0932  •  atorvastatin (LIPITOR) tablet 80 mg, 80 mg, Oral, Daily, Juliocesar Guerra MD, 80 mg at 10/11/20 0932  •  clopidogrel (PLAVIX) tablet 75 mg, 75 mg, Oral, Daily, Juliocesar Guerra MD, 75 mg at 10/11/20 0932  •  dextrose (D50W) 25 g/ 50mL Intravenous Solution 25 g, 25 g, Intravenous, Q15 Min PRN, Juliocesar Guerra MD  •  dextrose (GLUTOSE) oral gel 15 g, 15 g, Oral, Q15 Min PRN, Juliocesar Guerra MD  •  glucagon (human recombinant) (GLUCAGEN DIAGNOSTIC) injection 1 mg, 1 mg, Subcutaneous, Q15 Min PRN, Mari, " "Juliocesar Vallejo MD  •  influenza vac split quad (FLUZONE,FLUARIX,AFLURIA,FLULAVAL) injection 0.5 mL, 0.5 mL, Intramuscular, During Hospitalization, Juliocesar Guerra MD  •  insulin detemir (LEVEMIR) injection 20 Units, 20 Units, Subcutaneous, Daily, Juliocesar Guerra MD, 20 Units at 10/11/20 0932  •  insulin lispro (humaLOG) injection 2-7 Units, 2-7 Units, Subcutaneous, TID AC, Juliocesar Guerra MD, 2 Units at 10/10/20 1644  •  metoprolol succinate XL (TOPROL-XL) 24 hr tablet 50 mg, 50 mg, Oral, Daily, Juliocesar Guerra MD, 50 mg at 10/11/20 0932  •  nicotine (NICODERM CQ) 21 MG/24HR patch 1 patch, 1 patch, Transdermal, Q24H, Cornelius Carrasco DO, 1 patch at 10/11/20 1649  •  nystatin (MYCOSTATIN) powder, , Topical, Q12H, Cornelius Carrasco DO  •  pantoprazole (PROTONIX) EC tablet 40 mg, 40 mg, Oral, QAM , Juliocesar Guerra MD, 40 mg at 10/11/20 0932  •  [COMPLETED] Insert peripheral IV, , , Once **AND** sodium chloride 0.9 % flush 10 mL, 10 mL, Intravenous, PRN, Shoulders, Kristee Croft, APRN  •  sodium chloride 0.9 % flush 10 mL, 10 mL, Intravenous, Q12H, Juliocesar Guerra MD, 10 mL at 10/10/20 2106  •  sodium chloride 0.9 % flush 10 mL, 10 mL, Intravenous, PRN, Juliocesar Guerra MD  •  sodium chloride 0.9 % infusion, 75 mL/hr, Intravenous, Continuous, Cornelius Carrasco DO, Last Rate: 75 mL/hr at 10/11/20 1520, 75 mL/hr at 10/11/20 1520  •  sodium chloride nasal spray 1 spray, 1 spray, Each Nare, PRN, Juliocesar Guerra MD, 1 spray at 10/10/20 0412      Review of Systems  General: No fever  Cutaneous: Multiple areas of erosion, superficial wounds, erythema    Objective     Vital Signs:  Temp (24hrs), Av.6 °F (36.4 °C), Min:97.2 °F (36.2 °C), Max:98 °F (36.7 °C)      /75 (BP Location: Right arm, Patient Position: Lying)   Pulse 91   Temp 98 °F (36.7 °C) (Oral)   Resp 20   Ht 172.7 cm (67.99\")   Wt 120 kg (264 lb 8.8 oz)   SpO2 92%   BMI 40.23 " kg/m²         Physical Exam   General: The patient is a morbidly obese male who is actually lying across the bed sideways while nursing staff are trying to clean out into his pannus.  He did sit upright  Lower extremities with some improvement in the edema noted of his right foot greater than the left as evidenced by some wrinkling.  There is still superficial erosions along creases mainly around the left lower extremity-see photos  Neuro: Alert, oriented, speech clear  Psych: Pleasant and cooperative                Results Review:    I reviewed the patient's new clinical results.    Lab Results:  CBC:   Lab Results   Lab 10/09/20  1422 10/10/20  0556 10/11/20  0247   WBC 9.06 7.97 8.25   HEMOGLOBIN 10.0* 10.1* 10.0*   HEMATOCRIT 31.4* 32.2* 32.0*   PLATELETS 455* 466* 474*         CMP:   Lab Results   Lab 10/09/20  1609 10/10/20  0556 10/11/20  0247   SODIUM 135* 139 138   POTASSIUM 5.3* 4.9 4.2   CHLORIDE 102 104 106   CO2 21.0* 22.0 25.0   BUN 73* 71* 45*   CREATININE 2.91* 2.14* 1.28*   CALCIUM 9.6 10.0 9.9   BILIRUBIN 0.3  --   --    ALK PHOS 22*  --   --    ALT (SGPT) 17  --   --    AST (SGOT) 21  --   --    GLUCOSE 104* 131* 93         Culture Results:    Blood Culture   Date Value Ref Range Status   10/09/2020 No growth at 2 days  Preliminary     Wound Culture   Date Value Ref Range Status   10/09/2020 Light growth (2+) Staphylococcus aureus, MRSA (A)  Preliminary     Comment:     Methicillin resistant Staphylococcus aureus, Patient may be an isolation risk.   10/09/2020 Scant growth (1+) Gram Negative Bacilli (A)  Preliminary   10/09/2020 Rare Gram Negative Bacilli (A)  Preliminary       Microbiology Results Abnormal     Procedure Component Value - Date/Time    Blood Culture With RONALDO - Blood, Arm, Left [[id number]] Collected: 10/09/20 1422    Lab Status: Preliminary result Specimen: Blood from Arm, Left Updated: 10/11/20 1449     Blood Culture No growth at 2 days    Wound Culture - Wound, Leg, Left  [[id number]]  (Abnormal) Collected: 10/09/20 1609    Lab Status: Preliminary result Specimen: Wound from Leg, Left Updated: 10/11/20 0932     Wound Culture Light growth (2+) Staphylococcus aureus, MRSA     Comment: Methicillin resistant Staphylococcus aureus, Patient may be an isolation risk.         Scant growth (1+) Gram Negative Bacilli      Rare Gram Negative Bacilli     Gram Stain No WBCs or organisms seen    COVID PRE-OP / PRE-PROCEDURE SCREENING ORDER (NO ISOLATION) - Swab, Nasal Cavity [[id number]]  (Normal) Collected: 10/09/20 1424    Lab Status: Final result Specimen: Swab from Nasal Cavity Updated: 10/09/20 1518    Narrative:      The following orders were created for panel order COVID PRE-OP / PRE-PROCEDURE SCREENING ORDER (NO ISOLATION) - Swab, Nasal Cavity.  Procedure                               Abnormality         Status                     ---------                               -----------         ------                     COVID-19, [facility] IN-HOUS...[[id number]]  Normal              Final result                 Please view results for these tests on the individual orders.    COVID-19, [facility]OTT IN-HOUSE,NP Swab (NO TRANSPORT MEDIA) 2 HR TAT - Swab, Nasal Cavity [[id number]]  (Normal) Collected: 10/09/20 1424    Lab Status: Final result Specimen: Swab from Nasal Cavity Updated: 10/09/20 1518     COVID19 Not Detected    Narrative:      Fact sheet for providers: https://www.fda.gov/media/287638/download     Fact sheet for patients: https://www.fda.gov/media/643461/download                Radiology:   Imaging Results (Last 72 Hours)     Procedure Component Value Units Date/Time    US Venous Doppler Lower Extremity Bilateral (duplex) [[id number]] Collected: 10/11/20 0907     Updated: 10/11/20 0913    Narrative:      History: Swelling       Impression:      Impression: There is evidence of partially occlusive deep venous  thrombosis of the left superficial femoral vein. All other visualized  veins show no  evidence of DVT.     Comments: Bilateral lower extremity venous duplex exam was performed  using color Doppler flow, Doppler waveform analysis, and grayscale  imaging, with and without compression. There is evidence of partially  occlusive DVT in a segment of the left superficial femoral vein.  Otherwise all visualized veins show no evidence of DVT. No thrombus is  identified in the saphenofemoral junctions and greater saphenous veins  bilaterally.         This report was finalized on 10/11/2020 09:10 by Dr. Michael Shaikh MD.    CT Head Without Contrast [[id number]] Collected: 10/09/20 1505     Updated: 10/09/20 1524    Narrative:      EXAMINATION:  CT HEAD WO CONTRAST-  10/9/2020 2:47 PM CDT     HISTORY: Mental status change, unknown cause. The patient had difficulty  holding still for the examination.     TECHNIQUE: Multiple axial images were obtained through the brain without  contrast infusion. Multiplanar images were reconstructed.     DLP: 1674 mGy-cm. Automated dosage control was utilized.     COMPARISON: No comparison study.     FINDINGS: There is mild motion artifact. There are no hemorrhage, edema  or mass effect. 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 The ventricular system is  nondilated. The visualized paranasal sinuses are clear. The mastoid air  cells are clear. No calvarial fracture is seen.       Impression:      No hemorrhage, edema or mass effect. No acute findings.     The full report of this exam was immediately signed and available to the  emergency room. The patient is currently in the emergency room.     This report was finalized on 10/09/2020 15:07 by Dr. Salinas Morgan MD.    XR Tibia Fibula 2 View Right [[id number]] Collected: 10/09/20 1453     Updated: 10/09/20 1503    Narrative:         Exam:   XR TIBIA FIBULA 2 VW RIGHT-       Date:  10/9/2020      History:  Male, age  65 years;redness, swelling     COMPARISON:  None.     Findings :     There is no acute displaced fracture. No dislocation. No  suspicious  lytic or blastic lesion. No radiopaque foreign body.     Vascular calcifications.          Impression:      Impression:     No acute osseous pathology.     This report was finalized on 10/09/2020 14:54 by Dr. Enedina Parmar MD.    XR Tibia Fibula 2 View Left [[id number]] Collected: 10/09/20 1453     Updated: 10/09/20 1503    Narrative:      EXAMINATION:  XR TIBIA FIBULA 2 VW LEFT-  10/9/2020 2:29 PM CDT     HISTORY: Redness and swelling.     COMPARISON: No comparison study.     TECHNIQUE: 2 views were obtained.     FINDINGS:  The left tibia and fibula are intact. Vascular calcification  is noted. There is degenerative osteoarthritis of the left knee. There  is subcutaneous edema throughout the visualized lower leg.       Impression:      1. No acute bony abnormality of the tibia or fibula.  2. Degenerative osteoarthritis left knee.  3. Diffuse subcutaneous edema within the soft tissues. Vascular  calcification.     This report was finalized on 10/09/2020 14:54 by Dr. Salinas Morgan MD.    XR Foot 3+ View Right [[id number]] Collected: 10/09/20 1454     Updated: 10/09/20 1502    Narrative:         Exam:   XR FOOT 3+ VW RIGHT-       Date:  10/9/2020      History:  Male, age  65 years;redness, swelling     COMPARISON:  None.     Findings :  Mild nonspecific soft tissue swelling. 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     The Lisfranc joint spacing is within normal limits. No acute finding at  the base of fifth metatarsal bone. Degenerative changes in the foot  distally.        There is no acute displaced fracture. No dislocation. No suspicious  lytic or blastic lesion. No radiopaque foreign body.          Impression:      Impression:     Soft tissue swelling and prior radiation of the first digit, without  evidence of acute osteomyelitis..     This report was finalized on 10/09/2020 14:55 by Dr. Enedina Parmar MD.          Assessment/Plan     Active Hospital Problems    Diagnosis   • **Cellulitis  of left leg   • Essential hypertension   • Coronary artery disease with history of PCI and stent placement   • Acute renal failure (ARF) (CMS/[facility])   • Type 2 diabetes mellitus with hyperglycemia, with long-term current use of insulin (CMS/[facility])   • Diabetic neuropathy (CMS/[facility])   • Acute deep vein thrombosis (DVT) of femoral vein of left lower extremity (CMS/[facility])   • Chronic bilateral low back pain without sciatica   • BMI 40.0-44.9, adult (CMS/[facility])   • Tobacco abuse       IMPRESSION:  Chronic venous stasis changes in a morbidly obese male treated for cellulitis for the past several months.  Patient without chills or fever or leukocytosis so leaning more towards chronic venous stasis changes over acute on chronic cellulitis.  Very hard to evaluate with such edema and patient who will not allow compression.    Acute kidney injury-significantly improved      RECOMMENDATION:   · -Patient with multiple heart.  I discussed this again with patient, nursing and his wife present    · Had noted in consult I would not be opposed to stopping the Vanco and Zosyn and monitoring to see what the patient looks like clinically with his legs elevated.  Vanco Zosyn have been discontinued.  I think will be helpful to evaluate as his lower extremities look quite red when they are dependent and this may be just secondary to positioning      Also would recommend skin care regimen of his lower extremities and treatment of onychomycosis with Lamisil and monitoring per primary provider or wound care.      Danitza Hidalgo MD  10/11/20  18:40 CDT        Electronically signed by Danitza Hidalgo MD at 10/11/20 1852          Consult Notes (most recent note)      Danitza Hidalgo MD at 10/10/20 1618      Consult Orders    1. Inpatient Infectious Diseases Consult [[id number]] ordered by Juliocesar Guerra MD at 10/09/20 8962               INFECTIOUS DISEASES CONSULT NOTE    Patient:  Manuel Clark 65 y.o. male  ROOM #  "408/[id number]  YOB: 1955  MRN: [id number]  [facility]:  [id number]  Admit date: 10/9/2020   Admitting Physician: Cornelius Carrasco DO  Primary Care Physician: Kahty Granger APRN  REFERRING PROVIDER: Cornelius Clemons MD      REASON FOR CONSULTATION :left leg cellulits  MRSA skin infection history and patient request      HISTORY OF PRESENT ILLNESS: The patient is a morbidly obese 65-year-old gentleman who presented to the emergency department yesterday with complaints of lower extremity erythema, swelling and drainage.    He reports is been an ongoing problem and he is followed by his nurse practitioner as well as wound care at [facility].    He apparently has an outpatient appointment with Dr. Rao and requested infectious disease to see him while he was in the hospital.    He denies having any fevers or chills.  He has never had his toenails treated for onychomycosis.  He does not tolerate compression.  He is apparently been treated with antibiotics off and on and states that his \"infection\" will not go away      Past Medical History:   Diagnosis Date   • Arthritis    • Diabetes mellitus (CMS/HCC)    • Diabetic neuropathy (CMS/HCC)    • Hypertension    • Nerve damage     L forearm/hand secondary to compound fx     Past Surgical History:   Procedure Laterality Date   • CARDIAC SURGERY      5 stents   • CARPAL TUNNEL RELEASE     • KNEE SURGERY Right    • OTHER SURGICAL HISTORY      compound fracture of the L arm     Family History   Problem Relation Age of Onset   • Stroke Mother    • Cancer Mother         breast cancer x2   • Diabetes Mother    • Heart disease Mother    • Hypertension Mother    • Heart disease Father    • Arthritis Father    • Hypertension Father      Social History     Socioeconomic History   • Marital status:      Spouse name: Not on file   • Number of children: Not on file   • Years of education: Not on file   • Highest education level: Not on file   Tobacco " Use   • Smoking status: Current Every Day Smoker     Packs/day: 0.50   • Smokeless tobacco: Never Used   Substance and Sexual Activity   • Alcohol use: Yes   • Drug use: No   • Sexual activity: Defer           Current Meds:     Current Facility-Administered Medications   Medication Dose Route Frequency Provider Last Rate Last Dose   • acetaminophen (TYLENOL) tablet 650 mg  650 mg Oral Q4H PRN Juliocesar Guerra MD       • apixaban (ELIQUIS) tablet 5 mg  5 mg Oral Q12H Cornelius Carrasco DO       • atorvastatin (LIPITOR) tablet 80 mg  80 mg Oral Daily Jluiocesar Guerra MD   80 mg at 10/10/20 0844   • clopidogrel (PLAVIX) tablet 75 mg  75 mg Oral Daily Juliocesar Guerra MD   75 mg at 10/10/20 0844   • dextrose (D50W) 25 g/ 50mL Intravenous Solution 25 g  25 g Intravenous Q15 Min PRN Juliocesar Guerra MD       • dextrose (GLUTOSE) oral gel 15 g  15 g Oral Q15 Min PRN Juliocesar Guerra MD       • glucagon (human recombinant) (GLUCAGEN DIAGNOSTIC) injection 1 mg  1 mg Subcutaneous Q15 Min PRN Juliocesar Guerra MD       • influenza vac split quad (FLUZONE,FLUARIX,AFLURIA,FLULAVAL) injection 0.5 mL  0.5 mL Intramuscular During Hospitalization Juliocesar Guerra MD       • insulin detemir (LEVEMIR) injection 20 Units  20 Units Subcutaneous Daily Juliocesar Guerra MD   20 Units at 10/10/20 0843   • insulin lispro (humaLOG) injection 2-7 Units  2-7 Units Subcutaneous TID  Juliocesar Guerra MD       • metoprolol succinate XL (TOPROL-XL) 24 hr tablet 50 mg  50 mg Oral Daily Juliocesar Guerra MD   50 mg at 10/10/20 0844   • pantoprazole (PROTONIX) EC tablet 40 mg  40 mg Oral QAM  Juliocesar Guerra MD   40 mg at 10/10/20 0843   • piperacillin-tazobactam (ZOSYN) 3.375 g in iso-osmotic dextrose 50 ml (premix)  3.375 g Intravenous Q8H Danitza Hidalgo MD   3.375 g at 10/10/20 1351   • sodium chloride 0.9 % flush 10 mL  10 mL Intravenous PRN Shoulders,  JASSON Castro       • sodium chloride 0.9 % flush 10 mL  10 mL Intravenous Q12H Juliocesar Guerra MD   10 mL at 10/09/20 2316   • sodium chloride 0.9 % flush 10 mL  10 mL Intravenous PRN Juliocesar Guerra MD       • sodium chloride 0.9 % infusion  100 mL/hr Intravenous Continuous Juliocesar Guerra  mL/hr at 10/10/20 1230 100 mL/hr at 10/10/20 1230   • sodium chloride nasal spray 1 spray  1 spray Each Nare PRN Juliocesar Guerra MD   1 spray at 10/10/20 0412   • vancomycin (VANCOCIN) in iso-osmotic dextrose IVPB 1 g (premix) 200 mL  1,000 mg Intravenous Q24H Cornelius Carrasco DO   1,000 mg at 10/10/20 1551       Home Meds:  Prior to Admission medications    Medication Sig Start Date End Date Taking? Authorizing Provider   aspirin (ECOTRIN LOW STRENGTH) 81 MG EC tablet Take 81 mg by mouth.    Kenn Spears MD   atorvastatin (LIPITOR) 80 MG tablet Take 80 mg by mouth.    Kenn Spears MD   cefdinir (OMNICEF) 300 MG capsule  5/7/20   Kenn Spears MD   clopidogrel (PLAVIX) 75 MG tablet Take 75 mg by mouth.    Kenn Spears MD   Empagliflozin (Jardiance) 10 MG tablet 10 mg Daily. 3/18/20   Kenn Spears MD   fenofibric acid (TRILIPIX) 135 MG capsule delayed-release delayed release capsule Take 135 mg by mouth Daily. 11/25/19   Kenn Spears MD   Insulin Glargine (LANTUS SOLOSTAR) 100 UNIT/ML injection pen Inject 50 Units under the skin into the appropriate area as directed 2 (Two) Times a Day.    Kenn Spears MD   metoprolol succinate XL (TOPROL-XL) 50 MG 24 hr tablet Take 50 mg by mouth.    Kenn Spears MD   Multiple Vitamins-Minerals (MULTIVITAMIN WITH MINERALS) tablet tablet Take 1 tablet by mouth Daily.    Kenn Spears MD   omega-3 acid ethyl esters (LOVAZA) 1 g capsule Take 1 g by mouth 4 (Four) Times a Day. 12/16/19   Kenn Spears MD   omeprazole (priLOSEC) 20 MG capsule Take 20 mg by mouth  "Daily.    Kenn Spears MD   ONE TOUCH ULTRA TEST test strip CHECK BLOOD SUGAR 3 TIMES A DAY *E11.9* 11/25/19   Kenn Spears MD   pentoxifylline (TRENtal) 400 MG CR tablet Take 400 mg by mouth 3 (Three) Times a Day. 4/19/20   Kenn Spears MD   potassium chloride (K-DUR,KLOR-CON) 20 MEQ CR tablet Take 40 mEq by mouth.    Kenn Spears MD   pregabalin (LYRICA) 75 MG capsule Take 75 mg by mouth.    Kenn Spears MD   REGRANEX 0.01 % gel APPLY THIN LAYER TO AFFECTED AREA ONCE DAILY EVERY 12 HOURS ON, 12 HOURS OFF 4/9/20   Kenn Spears MD   SITagliptin-MetFORMIN HCl ER (JANUMET XR)  MG tablet Take 1 tablet by mouth.    Kenn Spears MD   spironolactone (ALDACTONE) 25 MG tablet Take 50 mg by mouth.    Kenn Spears MD   tiZANidine (ZANAFLEX) 4 MG tablet Take 4 mg by mouth 3 (Three) Times a Day As Needed. 12/16/19   Kenn Spears MD   triamterene-hydrochlorothiazide (DYAZIDE) 37.5-25 MG per capsule Take 1 capsule by mouth.    Kenn Spears MD   valsartan (DIOVAN) 80 MG tablet Take 80 mg by mouth.    Kenn Spears MD            Allergies   Allergen Reactions   • Iodine Unknown - High Severity   • Shellfish-Derived Products Anaphylaxis   • Sulfa Antibiotics Hives       Review of Systems   Constitutional: Negative for fever.   HENT: Negative for congestion.    Eyes: Negative for photophobia.   Respiratory: Negative for cough.    Cardiovascular: Negative for chest pain.   Genitourinary: Negative for dysuria.   Skin: Positive for color change and wound.   Allergic/Immunologic: Negative for immunocompromised state.   Neurological: Positive for weakness.   Psychiatric/Behavioral: Negative for confusion.         Vital Signs:  /60 (BP Location: Right arm, Patient Position: Lying)   Pulse 89   Temp 97.9 °F (36.6 °C) (Oral)   Resp 20   Ht 172.7 cm (67.99\")   Wt 120 kg (264 lb 8.8 oz)   SpO2 93%   BMI 40.23 kg/m²   Temp " (24hrs), Av °F (36.7 °C), Min:97.7 °F (36.5 °C), Max:98.4 °F (36.9 °C)      Physical Exam    General: Patient is a morbidly obese gentleman sitting up in his wheelchair with his feet flat on the foot rest appearing to be asleep  HEENT: Sclera anicteric, patient put on his face covering when requested  Respiratory: Effort even.  Unlabored.  Abdomen: Obese  Extremities: Gauze dressings were removed.  They were completely soaked with clear drainage.  There was malodor.  See photos.  Patient does have fairly symmetric erythema below the knees.  He has various areas of erosion but no deep wounds that I can appreciate.  Very thickened toenails consistent with onychomycosis.  Various areas of thickened scaly skin and moisture between toes.  Right pretibial region with plaque-like light-colored scaly lesion.  Neuro: Alert, oriented, speech clear  Psych: Cooperative with request to remove his dressings and to get back to bed and elevate LEs                  Results Review:    I reviewed the patient's new clinical results.    Lab Results:  CBC:   Lab Results   Lab 10/09/20  1422 10/10/20  0556   WBC 9.06 7.97   HEMOGLOBIN 10.0* 10.1*   HEMATOCRIT 31.4* 32.2*   PLATELETS 455* 466*       CMP:   Lab Results   Lab 10/09/20  1609 10/10/20  0556   SODIUM 135* 139   POTASSIUM 5.3* 4.9   CHLORIDE 102 104   CO2 21.0* 22.0   BUN 73* 71*   CREATININE 2.91* 2.14*   CALCIUM 9.6 10.0   BILIRUBIN 0.3  --    ALK PHOS 22*  --    ALT (SGPT) 17  --    AST (SGOT) 21  --    GLUCOSE 104* 131*       Lab Results (last 72 hours)     Procedure Component Value Units Date/Time    Blood Culture With RONALDO - Blood, Arm, Left [[id number]] Collected: 10/09/20 142    Specimen: Blood from Arm, Left Updated: 10/10/20 1445     Blood Culture No growth at 24 hours    POC Glucose Once [[id number]]  (Normal) Collected: 10/10/20 1225    Specimen: Blood Updated: 10/10/20 1236     Glucose 115 mg/dL     POC Glucose Once [[id number]]  (Abnormal) Collected: 10/10/20 0830     Specimen: Blood Updated: 10/10/20 0841     Glucose 147 mg/dL     aPTT [[id number]]  (Abnormal) Collected: 10/10/20 0556    Specimen: Blood Updated: 10/10/20 0657     PTT 49.3 seconds     Vancomycin, Peak [[id number]]  (Abnormal) Collected: 10/10/20 0556    Specimen: Blood Updated: 10/10/20 0645     Vancomycin Peak 17.10 mcg/mL     Basic Metabolic Panel [[id number]]  (Abnormal) Collected: 10/10/20 0556    Specimen: Blood Updated: 10/10/20 0633     Glucose 131 mg/dL      BUN 71 mg/dL      Creatinine 2.14 mg/dL      Sodium 139 mmol/L      Potassium 4.9 mmol/L      Chloride 104 mmol/L      CO2 22.0 mmol/L      Calcium 10.0 mg/dL      eGFR Non African Amer 31 mL/min/1.73      BUN/Creatinine Ratio 33.2     Anion Gap 13.0 mmol/L     Narrative:      GFR Normal >60  Chronic Kidney Disease <60  Kidney Failure <15      CBC & Differential [[id number]]  (Abnormal) Collected: 10/10/20 0556    Specimen: Blood Updated: 10/10/20 0616    Narrative:      The following orders were created for panel order CBC & Differential.  Procedure                               Abnormality         Status                     ---------                               -----------         ------                     CBC Auto Differential[[id number]]        Abnormal            Final result                 Please view results for these tests on the individual orders.    CBC Auto Differential [[id number]]  (Abnormal) Collected: 10/10/20 0556    Specimen: Blood Updated: 10/10/20 0616     WBC 7.97 10*3/mm3      RBC 3.81 10*6/mm3      Hemoglobin 10.1 g/dL      Hematocrit 32.2 %      MCV 84.5 fL      MCH 26.5 pg      MCHC 31.4 g/dL      RDW 19.2 %      RDW-SD 59.6 fl      MPV 10.2 fL      Platelets 466 10*3/mm3      Neutrophil % 75.2 %      Lymphocyte % 10.2 %      Monocyte % 11.7 %      Eosinophil % 0.8 %      Basophil % 0.5 %      Immature Grans % 1.6 %      Neutrophils, Absolute 6.00 10*3/mm3      Lymphocytes, Absolute 0.81 10*3/mm3      Monocytes, Absolute 0.93  10*3/mm3      Eosinophils, Absolute 0.06 10*3/mm3      Basophils, Absolute 0.04 10*3/mm3      Immature Grans, Absolute 0.13 10*3/mm3      nRBC 0.0 /100 WBC     Wound Culture - Wound, Leg, Left [[id number]] Collected: 10/09/20 1609    Specimen: Wound from Leg, Left Updated: 10/10/20 0156     Gram Stain No WBCs or organisms seen    aPTT [[id number]]  (Abnormal) Collected: 10/09/20 2321    Specimen: Blood Updated: 10/09/20 2355     PTT 38.7 seconds     Hemoglobin A1c [[id number]]  (Abnormal) Collected: 10/09/20 1422    Specimen: Blood Updated: 10/09/20 2223     Hemoglobin A1C 5.80 %     Narrative:      Hemoglobin A1C Ranges:    Increased Risk for Diabetes  5.7% to 6.4%  Diabetes                     >= 6.5%  Diabetic Goal                < 7.0%    POC Glucose Once [[id number]]  (Normal) Collected: 10/09/20 2124    Specimen: Blood Updated: 10/09/20 2136     Glucose 130 mg/dL     BNP [[id number]]  (Normal) Collected: 10/09/20 1609    Specimen: Blood Updated: 10/09/20 1959     proBNP 230.6 pg/mL     Narrative:      Among patients with dyspnea, NT-proBNP is highly sensitive for the detection of acute congestive heart failure. In addition NT-proBNP of <300 pg/ml effectively rules out acute congestive heart failure with 99% negative predictive value.    Results may be falsely decreased if patient taking Biotin.      aPTT [[id number]]  (Abnormal) Collected: 10/09/20 1901    Specimen: Blood Updated: 10/09/20 1917     PTT 35.2 seconds     Comprehensive Metabolic Panel [[id number]]  (Abnormal) Collected: 10/09/20 1609    Specimen: Blood Updated: 10/09/20 1634     Glucose 104 mg/dL      BUN 73 mg/dL      Creatinine 2.91 mg/dL      Sodium 135 mmol/L      Potassium 5.3 mmol/L      Chloride 102 mmol/L      CO2 21.0 mmol/L      Calcium 9.6 mg/dL      Total Protein 7.3 g/dL      Albumin 3.40 g/dL      ALT (SGPT) 17 U/L      AST (SGOT) 21 U/L      Alkaline Phosphatase 22 U/L      Total Bilirubin 0.3 mg/dL      eGFR Non  Amer 22  mL/min/1.73      Globulin 3.9 gm/dL      A/G Ratio 0.9 g/dL      BUN/Creatinine Ratio 25.1     Anion Gap 12.0 mmol/L     Narrative:      GFR Normal >60  Chronic Kidney Disease <60  Kidney Failure <15      COVID PRE-OP /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

## 2020-10-12 NOTE — NURSING NOTE
WOCN Note      Patient: Manuel Clark  MRN: [id number] : 1955         Problem List:   Patient Active Problem List    Diagnosis   • *Cellulitis of left leg [L03.116]   • Essential hypertension [I10]   • Coronary artery disease with history of PCI and stent placement [I25.10]   • Acute renal failure (ARF) (CMS/HCC) [N17.9]   • Type 2 diabetes mellitus with hyperglycemia, with long-term current use of insulin (CMS/HCC) [E11.65, Z79.4]   • Diabetic neuropathy (CMS/HCC) [E11.40]   • Acute deep vein thrombosis (DVT) of femoral vein of left lower extremity (CMS/HCC) [I82.412]   • Cubital tunnel syndrome on left [G56.22]   • Chronic bilateral low back pain without sciatica [M54.5, G89.29]   • Numbness and tingling of left upper extremity [R20.0, R20.2]   • Spinal stenosis in cervical region [M48.02]   • Neck pain [M54.2]   • Chronic tension-type headache, not intractable [G44.229]   • BMI 40.0-44.9, adult (CMS/[facility]) [Z68.41]   • Tobacco abuse [Z72.0]         Reason for Visit: Patient is an 65 y.o. male, being seen by [facility] for wounds of bilateral lower extremities and perineal area.    Patient presents with chronic venous stasis dermatitis, lymphedema and a history of wound infections of bilateral lower legs.  He sees Dr. Howell and Dr. Parks in [place] for wound care.  He does not agree with the treatment Dr. Howell is suggesting.  He states he has had Unna boots in the past and he will never do that again.  He states he has had an infection for a long time and has had antibiotics and it will not go away.  He uses an electric wheelchair due to having lower extremity weakness and low back issues per the patient.     On assessment his bilateral lower extremities on red and denuded with the left leg being worse than the right.  The left lower extremity has open ulcerations that are covered with slough.  He has a wound on his left lower leg that is likely related to socks being too tight and the increased edema and  drainage causing breakdown.      He also has breakdown of his perineal area that is related to pressure and moisture.  The base is covered with slough and the edges are irregular.  The periwound area is pink and blanchable.      Discussed outpatient wound care with patient and he states he plans to start going to wound care in [place] and is not interested in treatment here due to the drive.                                              Sensory Perception: 4-->no impairment (10/12/20 0753 : Denise Spence, RN)  Moisture: 3-->occasionally moist (10/12/20 0753 : Denise Spence, RN)  Activity: 2-->chairfast (10/12/20 0753 : Denise Spence, RN)  Mobility: 3-->slightly limited (10/12/20 0753 : Denise Spence, RN)  Nutrition: 3-->adequate (10/12/20 0753 : Denise Spence, RENATA)  Friction and Shear: 2-->potential problem (10/12/20 0753 : Denise Spence, RENATA)  Jose Maria Score: 17 (10/12/20 0753 : Denise Spence, RENATA)       Recommendations:    Nursing Interventions:   - Elevate Heels Above Level of Heart   - Turn Patient Q2H  - Use Repositioning Sheet and Wedges to Position Patient  - Use Seat Cushion When Up In Chair  - Apply Moisture Barrier After Any Incontinence   -Zguard  - Left lower extremity: Clean with soap and water.  Pat dry.  Apply Therahoney sheets to lower legs and wrap with Kerlix.  Wrap with ace wrap for slight compression.  Change BID and PRN for drainage.  Must keep legs elevated above heart  as much as possible.   - Right lower extremity: Clean with soap and water.  Pat dry.  Apply Hydraguard Barrier Cream.  Leave open to air.  Elevate above heart level.         )Sandie Matthews RN 10/12/2020

## 2020-10-12 NOTE — THERAPY EVALUATION
Patient Name: Manuel Clark  : 1955    MRN: [id number]                              Today's Date: 10/12/2020       Admit Date: 10/9/2020    Visit Dx:     ICD-10-CM ICD-9-CM   1. MONI (acute kidney injury) (CMS/[facility])  N17.9 584.9   2. Cellulitis of lower extremity, unspecified laterality  L03.119 682.6   3. Acute deep vein thrombosis (DVT) of femoral vein of left lower extremity (CMS/[facility])  I82.412 453.41   4. Impaired mobility and ADLs  Z74.09 V49.89    Z78.9    5. Decreased activities of daily living (ADL)  Z78.9 V49.89   6. Impaired functional mobility, balance, gait, and endurance  Z74.09 V49.89     Patient Active Problem List   Diagnosis   • Neck pain   • Chronic tension-type headache, not intractable   • BMI 40.0-44.9, adult (CMS/[facility])   • Tobacco abuse   • Spinal stenosis in cervical region   • Numbness and tingling of left upper extremity   • Cubital tunnel syndrome on left   • Chronic bilateral low back pain without sciatica   • Acute renal failure (ARF) (CMS/[facility])   • Type 2 diabetes mellitus with hyperglycemia, with long-term current use of insulin (CMS/[facility])   • Diabetic neuropathy (CMS/[facility])   • Cellulitis of left leg   • Acute deep vein thrombosis (DVT) of femoral vein of left lower extremity (CMS/[facility])   • Essential hypertension   • Coronary artery disease with history of PCI and stent placement     Past Medical History:   Diagnosis Date   • Arthritis    • Diabetes mellitus (CMS/[facility])    • Diabetic neuropathy (CMS/[facility])    • Hypertension    • Nerve damage     L forearm/hand secondary to compound fx     Past Surgical History:   Procedure Laterality Date   • CARDIAC SURGERY      5 stents   • CARPAL TUNNEL RELEASE     • KNEE SURGERY Right    • OTHER SURGICAL HISTORY      compound fracture of the L arm     General Information     Row Name 10/12/20 1120          Physical Therapy Time and Intention    Document Type  evaluation Pt presents to facility with c/o increased redness, swelling, and drainge to LEANDRA LAWRENCE. He  has a significant PMH including DM, AD< HTN, peripheral neuropathy, and chronic LE edema.  -MS (r) CC (t) MS (c)     Mode of Treatment  physical therapy  -MS (r) CC (t) MS (c)     Row Name 10/12/20 1120          General Information    Patient Profile Reviewed  yes  -MS (r) CC (t) MS (c)     Prior Level of Function  independent:;all household mobility;gait;transfer;w/c or scooter  -MS (r) CC (t) MS (c)     Existing Precautions/Restrictions  fall  -MS (r) CC (t) MS (c)     Barriers to Rehab  medically complex;previous functional deficit;physical barrier  -MS (r) CC (t) MS (c)     Row Name 10/12/20 1120          Living Environment    Lives With  spouse  -MS (r) CC (t) MS (c)     Row Name 10/12/20 1120          Home Main Entrance    Number of Stairs, Main Entrance  other (see comments) ramp  -MS (r) CC (t) MS (c)     Stair Railings, Main Entrance  railings on both sides of stairs  -MS (r) CC (t) MS (c)     Row Name 10/12/20 1120          Stairs Within Home, Primary    Number of Stairs, Within Home, Primary  none  -MS (r) CC (t) MS (c)     Stair Railings, Within Home, Primary  none  -MS (r) CC (t) MS (c)     Row Name 10/12/20 1120          Cognition    Orientation Status (Cognition)  oriented x 4  -MS (r) CC (t) MS (c)     Row Name 10/12/20 1120          Safety Issues, Functional Mobility    Safety Issues Affecting Function (Mobility)  at risk behavior observed;awareness of need for assistance;impulsivity;insight into deficits/self-awareness;judgment;safety precaution awareness;safety precautions follow-through/compliance;problem-solving  -MS (r) CC (t) MS (c)     Impairments Affecting Function (Mobility)  balance;coordination;endurance/activity tolerance;cognition;grasp;motor control;pain;postural/trunk control;range of motion (ROM);sensation/sensory awareness;shortness of breath;strength  -MS (r) CC (t) MS (c)     Cognitive Impairments, Mobility Safety/Performance  attention;awareness, need for  assistance;impulsivity;insight into deficits/self-awareness;judgment;problem-solving/reasoning;safety precaution awareness;safety precaution follow-through;sequencing abilities  -MS (r) CC (t) MS (c)       User Key  (r) = Recorded By, (t) = Taken By, (c) = Cosigned By    Initials Name Provider Type    Chuyita Westbrook R, PT, DPT, NCS Physical Therapist    CC Cristiana Helton, PT Student PT Student        Mobility     Row Name 10/12/20 1120          Bed Mobility    Scooting/[place]ging [place] (Bed Mobility)  verbal cues;minimum assist (75% patient effort)  -MS (r) CC (t) MS (c)     Comment (Bed Mobility)  Pt required Min A x2 for scooting at EOB  -MS (r) CC (t) MS (c)     Row Name 10/12/20 1120          Bed-Chair Transfer    Bed-Chair [place]ike (Transfers)  minimum assist (75% patient effort);verbal cues  -MS (r) CC (t) MS (c)     Assistive Device (Bed-Chair Transfers)  walker, 4-wheeled  -MS (r) CC (t) MS (c)     Row Name 10/12/20 1120          Sit-Stand Transfer    Sit-Stand [place] (Transfers)  minimum assist (75% patient effort);2 person assist;verbal cues  -MS (r) CC (t) MS (c)     Assistive Device (Sit-Stand Transfers)  walker, 4-wheeled  -MS (r) CC (t) MS (c)     Row Name 10/12/20 1120          Gait/Stairs (Locomotion)    [place] Level (Gait)  minimum assist (75% patient effort);2 person assist  -MS (r) CC (t) MS (c)     Assistive Device (Gait)  walker, 4-wheeled  -MS (r) CC (t) MS (c)     Distance in Feet (Gait)  6 steps from BSC to chair  -MS (r) CC (t) MS (c)       User Key  (r) = Recorded By, (t) = Taken By, (c) = Cosigned By    Initials Name Provider Type    Chuyita Westbrook R, PT, DPT, NCS Physical Therapist    CC Cirstiana Helton, PT Student PT Student        Obj/Interventions     Row Name 10/12/20 1120          Range of Motion Comprehensive    General Range of Motion  bilateral upper extremity ROM WFL;lower extremity range of motion deficits identified  -MS (r) CC (t) MS (c)      Comment, General Range of Motion  B ankle AROM limited ~75%; L knee AROM limited ~50%  -MS (r) CC (t) MS (c)     Row Name 10/12/20 1120          Strength Comprehensive (MMT)    General Manual Muscle Testing (MMT) Assessment  upper extremity strength deficits identified;lower extremity strength deficits identified  -MS (r) CC (t) MS (c)     Comment, General Manual Muscle Testing (MMT) Assessment  L  3+/5, All other BUE strength grossly 4-/5; B ankle and L knee strength 3-/5; B hip strength and R knee strength 3/5  -MS (r) CC (t) MS (c)     Row Name 10/12/20 1120          Balance    Balance Assessment  sitting static balance;sitting dynamic balance;standing static balance;standing dynamic balance  -MS (r) CC (t) MS (c)     Static Sitting Balance  WFL  -MS (r) CC (t) MS (c)     Dynamic Sitting Balance  WFL  -MS (r) CC (t) MS (c)     Static Standing Balance  mild impairment  -MS (r) CC (t) MS (c)     Dynamic Standing Balance  mild impairment;moderate impairment  -MS (r) CC (t) MS (c)     Balance Interventions  sitting;standing;sit to stand  -MS (r) CC (t) MS (c)     Comment, Balance  Pt requried CGA to maintain standing balance and CGA to Min A to  maintain standing balance with weight shifting and turning.  -MS (r) CC (t) MS (c)     Row Name 10/12/20 1120          Sensory Assessment (Somatosensory)    Sensory Assessment (Somatosensory)  UE sensation intact Peripheral neuropathy present in B feet  -MS (r) CC (t) MS (c)       User Key  (r) = Recorded By, (t) = Taken By, (c) = Cosigned By    Initials Name Provider Type    MS Chuyita Garcia R, PT, DPT, NCS Physical Therapist    CC Cristiana Helton, PT Student PT Student        Goals/Plan     Row Name 10/12/20 1220          Bed Mobility Goal 1 (PT)    Activity/Assistive Device (Bed Mobility Goal 1, PT)  bed mobility activities, all  -MS (r) CC (t) MS (c)     [place] Level/Cues Needed (Bed Mobility Goal 1, PT)  independent  -MS (r) CC (t) MS (c)     Time Frame (Bed  Mobility Goal 1, PT)  long term goal (LTG);by discharge  -MS (r) CC (t) MS (c)     Progress/Outcomes (Bed Mobility Goal 1, PT)  goal ongoing  -MS (r) CC (t) MS (c)     Row Name 10/12/20 1220          Transfer Goal 1 (PT)    Activity/Assistive Device (Transfer Goal 1, PT)  sit-to-stand/stand-to-sit;bed-to-chair/chair-to-bed;walker, rolling  -MS (r) CC (t) MS (c)     [place] Level/Cues Needed (Transfer Goal 1, PT)  standby assist  -MS (r) CC (t) MS (c)     Time Frame (Transfer Goal 1, PT)  long term goal (LTG);by discharge  -MS (r) CC (t) MS (c)     Progress/Outcome (Transfer Goal 1, PT)  goal ongoing  -MS (r) CC (t) MS (c)     Row Name 10/12/20 1220          Gait Training Goal 1 (PT)    Activity/Assistive Device (Gait Training Goal 1, PT)  gait (walking locomotion);walker, rolling  -MS (r) CC (t) MS (c)     [place] Level (Gait Training Goal 1, PT)  contact guard assist  -MS (r) CC (t) MS (c)     Distance (Gait Training Goal 1, PT)  25'  -MS (r) CC (t) MS (c)     Time Frame (Gait Training Goal 1, PT)  long term goal (LTG);by discharge  -MS (r) CC (t) MS (c)     Progress/Outcome (Gait Training Goal 1, PT)  goal ongoing  -MS (r) CC (t) MS (c)       User Key  (r) = Recorded By, (t) = Taken By, (c) = Cosigned By    Initials Name Provider Type    Chuyita Westbrook R, PT, DPT, NCS Physical Therapist    CC Cristiana Helton, PT Student PT Student        Clinical Impression     Row Name 10/12/20 1120          Pain    Additional Documentation  Pain Scale: Numbers Pre/Post-Treatment (Group)  -MS (r) CC (t) MS (c)     Row Name 10/12/20 1120          Pain Scale: Numbers Pre/Post-Treatment    Pretreatment Pain Rating  0/10 - no pain  -MS (r) CC (t) MS (c)     Posttreatment Pain Rating  0/10 - no pain  -MS (r) CC (t) MS (c)     Row Name 10/12/20 1120          Plan of Care Review    Plan of Care Reviewed With  patient  -MS (r) CC (t) MS (c)     Progress  improving  -MS (r) CC (t) MS (c)     Outcome Summary  PT eval  completed. Pt alert and oriented x4. Pt demonstrated impulsivity throughout session today and required frequent vcs to maintain safety. Pt was sitting EOB upon entry and required Min A to scoot closer to EOB in order to complete lower body dressing. He required Min A x2 to complete sit<>stand transfer using RW to maintain standing balance. Hw ambulated ~6 steps in room to get from BSC to chair, during which he demonstrated a shuffling gait pattern d/t decreased ankle dorsiflexion strength. Pt would benefit from skilled PT in order to address strength deficits and improve functional mobility. Recommend d/c to home with assist upon d/c from facility.  -MS (r) CC (t) MS (c)     Row Name 10/12/20 1120          Therapy Assessment/Plan (PT)    Patient/Family Therapy Goals Statement (PT)  Pt wishes to go home.  -MS (r) CC (t) MS (c)     Rehab Potential (PT)  fair, will monitor progress closely  -MS (r) CC (t) MS (c)     Criteria for Skilled Interventions Met (PT)  yes  -MS (r) CC (t) MS (c)     Predicted Duration of Therapy Intervention (PT)  until d/c  -MS (r) CC (t) MS (c)     Row Name 10/12/20 1120          Positioning and Restraints    Pre-Treatment Position  in bed sitting EOB  -MS (r) CC (t) MS (c)     Post Treatment Position  chair  -MS (r) CC (t) MS (c)     In Chair  sitting;call light within reach;encouraged to call for assist  -MS (r) CC (t) MS (c)       User Key  (r) = Recorded By, (t) = Taken By, (c) = Cosigned By    Initials Name Provider Type    Chuyita Westbrook, PT, DPT, NCS Physical Therapist    CC Cristiana Helton, PT Student PT Student        Outcome Measures     Row Name 10/12/20 1221          How much help from another person do you currently need...    Turning from your back to your side while in flat bed without using bedrails?  3  -MS (r) CC (t) MS (c)     Moving from lying on back to sitting on the side of a flat bed without bedrails?  3  -MS (r) CC (t) MS (c)     Moving to and from a bed to a  chair (including a wheelchair)?  2  -MS (r) CC (t) MS (c)     Standing up from a chair using your arms (e.g., wheelchair, bedside chair)?  2  -MS (r) CC (t) MS (c)     Climbing 3-5 steps with a railing?  1  -MS (r) CC (t) MS (c)     To walk in hospital room?  2  -MS (r) CC (t) MS (c)     AM-PAC 6 Clicks Score (PT)  13  -MS (r) CC (t)     Row Name 10/12/20 1221          Functional Assessment    Outcome Measure Options  AM-PAC 6 Clicks Basic Mobility (PT)  -MS (r) CC (t) MS (c)       User Key  (r) = Recorded By, (t) = Taken By, (c) = Cosigned By    Initials Name Provider Type    Chuyita Westbrook R, PT, DPT, NCS Physical Therapist    CC Cristiana Helton, PT Student PT Student        Physical Therapy Education                 Title: PT OT SLP Therapies (In Progress)     Topic: Physical Therapy (In Progress)     Point: Mobility training (Done)     Learning Progress Summary           Patient Acceptance, E, VU by  at 10/12/2020 1304    Comment: Pt educated on PT's role in POC                   Point: Home exercise program (Not Started)     Learner Progress:  Not documented in this visit.          Point: Body mechanics (Not Started)     Learner Progress:  Not documented in this visit.          Point: Precautions (Not Started)     Learner Progress:  Not documented in this visit.                      User Key     Initials Effective Dates Name Provider Type Discipline     09/02/20 -  Cristiana Helton, PT Student PT Student PT              PT Recommendation and Plan  Planned Therapy Interventions (PT): balance training, gait training, patient/family education, home exercise program, ROM (range of motion), strengthening, transfer training  Plan of Care Reviewed With: patient  Progress: improving  Outcome Summary: PT eval completed. Pt alert and oriented x4. Pt demonstrated impulsivity throughout session today and required frequent vcs to maintain safety. Pt was sitting EOB upon entry and required Min A to scoot closer to  EOB in order to complete lower body dressing. He required Min A x2 to complete sit<>stand transfer using RW to maintain standing balance. Hw ambulated ~6 steps in room to get from BSC to chair, during which he demonstrated a shuffling gait pattern d/t decreased ankle dorsiflexion strength. Pt would benefit from skilled PT in order to address strength deficits and improve functional mobility. Recommend d/c to home with assist upon d/c from facility.     Time Calculation:   PT Charges     Row Name 10/12/20 1222             Time Calculation    Start Time  1120 with 5 minute chart review  -MS (r) CC (t) MS (c)      Stop Time  1200  -MS (r) CC (t) MS (c)      Time Calculation (min)  40 min  -MS (r) CC (t)      PT Received On  10/12/20  -MS (r) CC (t) MS (c)      PT Goal Re-Cert Due Date  10/22/20  -MS (r) CC (t) MS (c)        User Key  (r) = Recorded By, (t) = Taken By, (c) = Cosigned By    Initials Name Provider Type    MS Jose Chuyita R, PT, DPT, NCS Physical Therapist    CC Cristiana Helton, PT Student PT Student        Therapy Charges for Today     Code Description Service Date Service Provider Modifiers Qty    59356491424 HC PT EVAL LOW COMPLEXITY 3 10/12/2020 Cristiana Helton, PT Student GP 1          PT G-Codes  Outcome Measure Options: AM-PAC 6 Clicks Basic Mobility (PT)  AM-PAC 6 Clicks Score (PT): 13  AM-PAC 6 Clicks Score (OT): 18    Cristiana Helton PT Student  10/12/2020

## 2020-10-12 NOTE — PLAN OF CARE
Goal Outcome Evaluation:  Plan of Care Reviewed With: patient  Progress: no change  Outcome Summary: Patient has sat on the edge of the bed for most of the day. Lots of encouragement to elevate legs, but patient does not cooperate. PT eval done. Referral sent to [facility] for d/c planning. VSS. No c/o pain. BLE wrapped with kerlix and ace wrap. Continue to monitor.

## 2020-10-12 NOTE — PLAN OF CARE
Problem: Adult Inpatient Plan of Care  Goal: Plan of Care Review  Recent Flowsheet Documentation  Taken 10/12/2020 1121 by Karyn Ortega OT Student  Progress: improving  Plan of Care Reviewed With: patient  Outcome Summary: OT tx completed. Pt was A&Ox4. Pt stated he wanted to get dressed with nursing before beginning therapy. Nsg had to convince pt that therapy could assist in getting dressed at this time and pt agreed. Pt performed donning shorts while sitting EOB with Mod A. Pt educated on energy conservation techniques for LBD. Pt performed sit<>stand with Min Ax2 and amb to  BSC with Min Ax2 using rollator. Pt performed toileting with Min Ax2 and amb back to chair with min Ax2 using rollator. Pt performed face washing and applying deodorant while sitting up in the chair with set up and supervision. Continue OT POC.

## 2020-10-12 NOTE — PROGRESS NOTES
Continued Stay Note  [facility]     Patient Name: Manuel Clark  MRN: [id number]  Today's Date: 10/12/2020    Admit Date: 10/9/2020    Discharge Plan     Row Name 10/12/20 0932       Plan    Plan  SNF    Patient/Family in Agreement with Plan  yes    Plan Comments  [facility] is not accepting pt's at this time per Gosia at facility.  Pt states he wants referral made to [facility].   SW has faxed referral and informed [facility] of referral.  BESSY Pratt.        Discharge Codes    No documentation.             BESSY Lowe

## 2020-10-13 LAB
ANION GAP SERPL CALCULATED.3IONS-SCNC: 9 MMOL/L (ref 5–15)
BUN SERPL-MCNC: 17 MG/DL (ref 8–23)
BUN/CREAT SERPL: 23.9 (ref 7–25)
CALCIUM SPEC-SCNC: 9.6 MG/DL (ref 8.6–10.5)
CHLORIDE SERPL-SCNC: 107 MMOL/L (ref 98–107)
CO2 SERPL-SCNC: 25 MMOL/L (ref 22–29)
CREAT SERPL-MCNC: 0.71 MG/DL (ref 0.76–1.27)
GFR SERPL CREATININE-BSD FRML MDRD: 112 ML/MIN/1.73
GLUCOSE BLDC GLUCOMTR-MCNC: 102 MG/DL (ref 70–130)
GLUCOSE BLDC GLUCOMTR-MCNC: 144 MG/DL (ref 70–130)
GLUCOSE BLDC GLUCOMTR-MCNC: 177 MG/DL (ref 70–130)
GLUCOSE BLDC GLUCOMTR-MCNC: 209 MG/DL (ref 70–130)
GLUCOSE SERPL-MCNC: 110 MG/DL (ref 65–99)
POTASSIUM SERPL-SCNC: 3.8 MMOL/L (ref 3.5–5.2)
SODIUM SERPL-SCNC: 141 MMOL/L (ref 136–145)

## 2020-10-13 PROCEDURE — 63710000001 INSULIN LISPRO (HUMAN) PER 5 UNITS: Performed by: FAMILY MEDICINE

## 2020-10-13 PROCEDURE — 97116 GAIT TRAINING THERAPY: CPT

## 2020-10-13 PROCEDURE — 97110 THERAPEUTIC EXERCISES: CPT

## 2020-10-13 PROCEDURE — 97535 SELF CARE MNGMENT TRAINING: CPT | Performed by: OCCUPATIONAL THERAPIST

## 2020-10-13 PROCEDURE — 63710000001 INSULIN DETEMIR PER 5 UNITS: Performed by: FAMILY MEDICINE

## 2020-10-13 PROCEDURE — 80048 BASIC METABOLIC PNL TOTAL CA: CPT | Performed by: FAMILY MEDICINE

## 2020-10-13 PROCEDURE — 82962 GLUCOSE BLOOD TEST: CPT

## 2020-10-13 RX ADMIN — ATORVASTATIN CALCIUM 80 MG: 40 TABLET, FILM COATED ORAL at 09:50

## 2020-10-13 RX ADMIN — NICOTINE 1 PATCH: 21 PATCH, EXTENDED RELEASE TRANSDERMAL at 15:43

## 2020-10-13 RX ADMIN — INSULIN DETEMIR 20 UNITS: 100 INJECTION, SOLUTION SUBCUTANEOUS at 09:51

## 2020-10-13 RX ADMIN — PANTOPRAZOLE SODIUM 40 MG: 40 TABLET, DELAYED RELEASE ORAL at 06:30

## 2020-10-13 RX ADMIN — NYSTATIN: 100000 POWDER TOPICAL at 11:16

## 2020-10-13 RX ADMIN — APIXABAN 5 MG: 5 TABLET, FILM COATED ORAL at 20:00

## 2020-10-13 RX ADMIN — SODIUM CHLORIDE, PRESERVATIVE FREE 10 ML: 5 INJECTION INTRAVENOUS at 20:00

## 2020-10-13 RX ADMIN — ACETAMINOPHEN 650 MG: 325 TABLET, FILM COATED ORAL at 22:17

## 2020-10-13 RX ADMIN — CLOPIDOGREL BISULFATE 75 MG: 75 TABLET, FILM COATED ORAL at 09:50

## 2020-10-13 RX ADMIN — ACETAMINOPHEN 650 MG: 325 TABLET, FILM COATED ORAL at 13:19

## 2020-10-13 RX ADMIN — APIXABAN 5 MG: 5 TABLET, FILM COATED ORAL at 09:50

## 2020-10-13 RX ADMIN — ACETAMINOPHEN 650 MG: 325 TABLET, FILM COATED ORAL at 17:26

## 2020-10-13 RX ADMIN — METOPROLOL TARTRATE 50 MG: 50 TABLET, FILM COATED ORAL at 20:00

## 2020-10-13 RX ADMIN — INSULIN LISPRO 3 UNITS: 100 INJECTION, SOLUTION INTRAVENOUS; SUBCUTANEOUS at 17:26

## 2020-10-13 RX ADMIN — METOPROLOL TARTRATE 50 MG: 50 TABLET, FILM COATED ORAL at 09:50

## 2020-10-13 NOTE — PLAN OF CARE
OT tx completed.  Pt. Ambulated with rollator to shower chair & bathed self using AD/DME to improve his safety & indep.  Mr. Clark then ambulated back to bed for LE dressing changes.  Mr. Clark was able to mobilize CGA/Min A x 1 & bathe self at S while seated. He required rest breaks, cues, & adaptive tech to max his indep & safety but has made progress since SOC.  Cont OT tx.

## 2020-10-13 NOTE — PROGRESS NOTES
[facility]  INPATIENT PROGRESS NOTE    Length of Stay: 4  Date of Admission: 10/9/2020  Primary Care Physician: Kathy Granger APRN    Subjective     Chief Complaint:     Bilateral lower extremity erythema and weakness    HPI     The patient has no new complaints today.  He agreed to bandaging and Ace wrap application of his left lower extremity today.  His left foot appears to be significantly improved with much less rubor.  Right lower extremity also appears to be significantly improved with less rubor.  He has been more judicious about elevating his legs however at the time of my evaluation he was sitting in a chair at bedside eating supper with both legs dangling in the floor.  He has been accepted to SNF tomorrow in transfer.    Review of Systems     All pertinent negatives and positives are as above. All other systems have been reviewed and are negative unless otherwise stated.     Objective    Temp:  [97.6 °F (36.4 °C)-99.8 °F (37.7 °C)] 97.6 °F (36.4 °C)  Heart Rate:  [65-81] 80  Resp:  [16] 16  BP: (124-168)/(69-97) 168/97    Lab Results (last 24 hours)     Procedure Component Value Units Date/Time    POC Glucose Once [[id number]]  (Abnormal) Collected: 10/13/20 1545    Specimen: Blood Updated: 10/13/20 1601     Glucose 209 mg/dL     Blood Culture With RONALDO - Blood, Arm, Left [[id number]] Collected: 10/09/20 1422    Specimen: Blood from Arm, Left Updated: 10/13/20 1445     Blood Culture No growth at 4 days    POC Glucose Once [[id number]]  (Abnormal) Collected: 10/13/20 1139    Specimen: Blood Updated: 10/13/20 1151     Glucose 144 mg/dL     Wound Culture - Wound, Leg, Left [[id number]]  (Abnormal)  (Susceptibility) Collected: 10/09/20 1609    Specimen: Wound from Leg, Left Updated: 10/13/20 0820     Wound Culture Light growth (2+) Staphylococcus aureus, MRSA     Comment: Methicillin resistant Staphylococcus aureus, Patient may be an isolation risk.            Scant growth (1+) Achromobacter xylosoxidans      Rare Stenotrophomonas maltophilia     Gram Stain No WBCs or organisms seen    Narrative:      Ceftazidime to follow     Susceptibility      Staphylococcus aureus, MRSA     RACHEL     Clindamycin Resistant     Erythromycin Resistant     Inducible Clindamycin Resistance Negative     Oxacillin Resistant     Penicillin G Resistant     Rifampin Susceptible     Tetracycline Resistant     Trimethoprim + Sulfamethoxazole Susceptible     Vancomycin Susceptible                Susceptibility      Achromobacter xylosoxidans     RACHEL     Cefepime Susceptible     Ceftazidime Susceptible     Gentamicin Resistant     Levofloxacin Intermediate     Meropenem Susceptible     Piperacillin + Tazobactam Susceptible     Tetracycline Resistant     Tobramycin Intermediate                Susceptibility      Stenotrophomonas maltophilia     RACHEL     Levofloxacin Resistant     Trimethoprim + Sulfamethoxazole Resistant                    POC Glucose Once [[id number]]  (Normal) Collected: 10/13/20 0751    Specimen: Blood Updated: 10/13/20 0817     Glucose 102 mg/dL     Basic Metabolic Panel [[id number]]  (Abnormal) Collected: 10/13/20 0429    Specimen: Blood Updated: 10/13/20 0534     Glucose 110 mg/dL      BUN 17 mg/dL      Creatinine 0.71 mg/dL      Sodium 141 mmol/L      Potassium 3.8 mmol/L      Chloride 107 mmol/L      CO2 25.0 mmol/L      Calcium 9.6 mg/dL      eGFR Non African Amer 112 mL/min/1.73      BUN/Creatinine Ratio 23.9     Anion Gap 9.0 mmol/L     Narrative:      GFR Normal >60  Chronic Kidney Disease <60  Kidney Failure <15      POC Glucose Once [[id number]]  (Normal) Collected: 10/12/20 2014    Specimen: Blood Updated: 10/12/20 2025     Glucose 123 mg/dL           Imaging Results (Last 24 Hours)     ** No results found for the last 24 hours. **             Intake/Output Summary (Last 24 hours) at 10/13/2020 9197  Last data filed at 10/13/2020 1255  Gross per 24 hour   Intake 240 ml    Output 1175 ml   Net -935 ml       Physical Exam  Constitutional:       Appearance: He is well-developed.      Comments: Sitting up in a chair at bedside eating supper with both legs dangling in the floor.  No family present.   HENT:      Head: Normocephalic and atraumatic.   Eyes:      Conjunctiva/sclera: Conjunctivae normal.      Pupils: Pupils are equal, round, and reactive to light.   Neck:      Musculoskeletal: Neck supple.      Vascular: No JVD.   Cardiovascular:      Rate and Rhythm: Normal rate and regular rhythm.      Heart sounds: Normal heart sounds. No murmur. No friction rub. No gallop.    Pulmonary:      Effort: Pulmonary effort is normal. No respiratory distress.      Breath sounds: Normal breath sounds. No wheezing or rales.   Chest:      Chest wall: No tenderness.   Abdominal:      General: Bowel sounds are normal. There is no distension.      Palpations: Abdomen is soft.      Tenderness: There is no abdominal tenderness. There is no guarding or rebound.   Musculoskeletal: Normal range of motion.         General: Swelling (L>R) present. No tenderness or deformity.   Skin:     General: Skin is warm and dry.  There is a small amount of serous fluid drainage noted from the left lower extremity in the areas of skin breakdown on the anterior portion of the left foot..     Findings: Erythema present bilaterally.  No rash.      Comments: Left lower extremity is dressed with Kerlix and with an Ace wrap overlay.  Neurological:      Mental Status: He is alert and oriented to person, place, and time.      Cranial Nerves: No cranial nerve deficit.      Motor: No abnormal muscle tone.      Deep Tendon Reflexes: Reflexes normal.   Psychiatric:      Comments: He is conversant.     Results Review:  I have reviewed the labs, radiology results, and diagnostic studies since my last progress note and made treatment changes reflective of the results.   I have reviewed the current medications.    Assessment/Plan      Active Hospital Problems    Diagnosis   • **Cellulitis of left leg   • Essential hypertension   • Coronary artery disease with history of PCI and stent placement   • Acute renal failure (ARF) (CMS/[facility])   • Type 2 diabetes mellitus with hyperglycemia, with long-term current use of insulin (CMS/[facility])   • Diabetic neuropathy (CMS/[facility])   • Acute deep vein thrombosis (DVT) of femoral vein of left lower extremity (CMS/[facility])   • Chronic bilateral low back pain without sciatica   • BMI 40.0-44.9, adult (CMS/[facility])   • Tobacco abuse       PLAN:  Kerlix, gauze and Ace overwrap for compression  Elevate legs, elevate legs, elevate legs.  Discharged to SNF tomorrow    Electronically signed by Salomon Garber DO, 10/13/20, 17:37 CDT.

## 2020-10-13 NOTE — THERAPY TREATMENT NOTE
Acute Care - Occupational Therapy Treatment Note  [facility]     Patient Name: Manuel Clark  : 1955  MRN: [id number]  Today's Date: 10/13/2020             Admit Date: 10/9/2020       ICD-10-CM ICD-9-CM   1. MONI (acute kidney injury) (CMS/[facility])  N17.9 584.9   2. Cellulitis of lower extremity, unspecified laterality  L03.119 682.6   3. Acute deep vein thrombosis (DVT) of femoral vein of left lower extremity (CMS/[facility])  I82.412 453.41   4. Impaired mobility and ADLs  Z74.09 V49.89    Z78.9    5. Decreased activities of daily living (ADL)  Z78.9 V49.89   6. Impaired functional mobility, balance, gait, and endurance  Z74.09 V49.89     Patient Active Problem List   Diagnosis   • Neck pain   • Chronic tension-type headache, not intractable   • BMI 40.0-44.9, adult (CMS/[facility])   • Tobacco abuse   • Spinal stenosis in cervical region   • Numbness and tingling of left upper extremity   • Cubital tunnel syndrome on left   • Chronic bilateral low back pain without sciatica   • Acute renal failure (ARF) (CMS/[facility])   • Type 2 diabetes mellitus with hyperglycemia, with long-term current use of insulin (CMS/[facility])   • Diabetic neuropathy (CMS/[facility])   • Cellulitis of left leg   • Acute deep vein thrombosis (DVT) of femoral vein of left lower extremity (CMS/[facility])   • Essential hypertension   • Coronary artery disease with history of PCI and stent placement     Past Medical History:   Diagnosis Date   • Arthritis    • Diabetes mellitus (CMS/[facility])    • Diabetic neuropathy (CMS/[facility])    • Hypertension    • Nerve damage     L forearm/hand secondary to compound fx     Past Surgical History:   Procedure Laterality Date   • CARDIAC SURGERY      5 stents   • CARPAL TUNNEL RELEASE     • KNEE SURGERY Right    • OTHER SURGICAL HISTORY      compound fracture of the L arm          OT ASSESSMENT FLOWSHEET (last 12 hours)      OT Evaluation and Treatment     Row Name 10/13/20 1033                   OT Time and Intention    Subjective Information   complains of;weakness;fatigue;pain  -        Document Type  therapy note (daily note)  -        Mode of Treatment  occupational therapy  -           Bed Mobility    Sit-Supine Waldo (Bed Mobility)  moderate assist (50% patient effort);verbal cues  -        Bed Mobility, Safety Issues  decreased use of legs for bridging/pushing  -        Assistive Device (Bed Mobility)  bed rails  -           Functional Mobility    Functional Mobility- Ind. Level  contact guard assist;minimum assist (75% patient effort)  -        Functional Mobility- Device  rollator  -        Functional Mobility- Safety Issues  step length decreased;weight-shifting ability decreased;balance decreased during turns  -        Functional Mobility- Comment  in room from chair to bathroom & back to bed  -           Transfer Assessment/Treatment    Transfers  sit-stand transfer;stand-sit transfer;shower transfer  -           Transfers    Sit-Stand Waldo (Transfers)  contact guard;minimum assist (75% patient effort);verbal cues  -        Stand-Sit Waldo (Transfers)  contact guard;minimum assist (75% patient effort)  -        Waldo Level (Shower Transfer)  contact guard;minimum assist (75% patient effort);verbal cues  -        Assistive Device (Shower Transfer)  walker, 4-wheeled  -           Sit-Stand Transfer    Assistive Device (Sit-Stand Transfers)  walker, 4-wheeled  -           Stand-Sit Transfer    Assistive Device (Stand-Sit Transfers)  walker, 4-wheeled  -           Shower Transfer    Type (Shower Transfer)  stand pivot/stand step roll in shower  -           Safety Issues, Functional Mobility    Safety Issues Affecting Function (Mobility)  positioning of assistive device;friction/shear risk  -        Impairments Affecting Function (Mobility)  balance;endurance/activity tolerance;cognition;pain;sensation/sensory awareness;shortness of breath;strength  -           Activities of Daily  Living    BADL Assessment/Intervention  bathing;lower body dressing;upper body dressing;grooming  -           Bathing Assessment/Intervention    Thompson Level (Bathing)  set up;supervision;bathing skills  -        Assistive Devices (Bathing)  hand-held shower spray hose;shower chair  -        Position (Bathing)  supported sitting  -        Comment (Bathing)  Pt. showered self while seate on chair with LH shower head to increase ROM & use of grab bar for balance  -           Upper Body Dressing Assessment/Training    Thompson Level (Upper Body Dressing)  minimum assist (75% patient effort);don;doff hospital gown  -        Position (Upper Body Dressing)  supported sitting  -           Lower Body Dressing Assessment/Training    Thompson Level (Lower Body Dressing)  maximum assist (25% patient effort);don;doff;socks  -        Position (Lower Body Dressing)  supported sitting  -           Grooming Assessment/Training    Thompson Level (Grooming)  set up;hair care, combing/brushing;wash face, hands deodorant  -        Position (Grooming)  supported sitting  -           BADL Safety/Performance    Impairments, BADL Safety/Performance  balance;endurance/activity tolerance;pain;range of motion;strength;shortness of breath  [facility]        Skilled BADL Treatment/Intervention  adaptive equipment training;BADL process/adaptation training;compensatory training;energy conservation  -           Wound 10/09/20 2254 Right lower perineum Pressure Injury    Wound - Properties Group Placement Date: 10/09/20  -OF Placement Time: 2254 -OF Present on Hospital Admission: Y  -OF Side: Right  -OF Orientation: lower  -OF Location: perineum  -OF Primary Wound Type: Pressure inj  -OF    Retired Wound - Properties Group Date first assessed: 10/09/20  -OF Time first assessed: 2254 -OF Present on Hospital Admission: Y  -OF Side: Right  -OF Location: perineum  -OF Primary Wound Type: Pressure inj  -OF        Wound 10/10/20 0237 Left distal leg    Wound - Properties Group Placement Date: 10/10/20  -OF Placement Time: 0237  -OF Present on Hospital Admission: Y  -OF Side: Left  -OF Orientation: distal  -OF Location: leg  -OF    Retired Wound - Properties Group Date first assessed: 10/10/20  -OF Time first assessed: 0237  -OF Present on Hospital Admission: Y  -OF Side: Left  -OF Location: leg  -OF       Wound 10/10/20 0255 Right medial thigh    Wound - Properties Group Placement Date: 10/10/20  -OF Placement Time: 0255  -OF Side: Right  -OF Orientation: medial  -OF Location: thigh  -OF    Retired Wound - Properties Group Date first assessed: 10/10/20  -OF Time first assessed: 0255  -OF Side: Right  -OF Location: thigh  -OF       Plan of Care Review    Plan of Care Reviewed With  patient;spouse  -        Progress  improving  -           Positioning and Restraints    Pre-Treatment Position  sitting in chair/recliner  -        Post Treatment Position  bed  -        In Bed  side lying right;call light within reach;encouraged to call for assist;with family/caregiver;with nsg;side rails up x2;pillow between legs  -           Progress Summary (OT)    Progress Toward Functional Goals (OT)  progress toward functional goals is good  -        Barriers to Overall Progress (OT)  pain, decreased ac mery,   -CH          User Key  (r) = Recorded By, (t) = Taken By, (c) = Cosigned By    Initials Name Effective Dates     Minnie Mitchell OTR/JERAMY 07/05/20 -     OF Rica Almeida RN 06/22/20 -          Occupational Therapy Education                 Title: PT OT SLP Therapies (In Progress)     Topic: Occupational Therapy (In Progress)     Point: ADL training (In Progress)     Description:   Instruct learner(s) on proper safety adaptation and remediation techniques during self care or transfers.   Instruct in proper use of assistive devices.              Learning Progress Summary           Patient Acceptance, E,D, VU,NR by  at  10/13/2020 1130    Acceptance, E,D, VU,DU,NR by  at 10/12/2020 1221    Acceptance, E, NR by MM at 10/11/2020 1609    Comment: OT role, benefits, POC, d/c planning   Family Acceptance, E, NR by MM at 10/11/2020 1609    Comment: OT role, benefits, POC, d/c planning                   Point: Home exercise program (Not Started)     Description:   Instruct learner(s) on appropriate technique for monitoring, assisting and/or progressing therapeutic exercises/activities.              Learner Progress:  Not documented in this visit.          Point: Precautions (In Progress)     Description:   Instruct learner(s) on prescribed precautions during self-care and functional transfers.              Learning Progress Summary           Patient Acceptance, E, NR by MM at 10/11/2020 1609    Comment: OT role, benefits, POC, d/c planning   Family Acceptance, E, NR by MM at 10/11/2020 1609    Comment: OT role, benefits, POC, d/c planning                   Point: Body mechanics (In Progress)     Description:   Instruct learner(s) on proper positioning and spine alignment during self-care, functional mobility activities and/or exercises.              Learning Progress Summary           Patient Acceptance, E,D, VU,DU,NR by  at 10/12/2020 1221    Acceptance, E, NR by MM at 10/11/2020 1609    Comment: OT role, benefits, POC, d/c planning   Family Acceptance, E, NR by MM at 10/11/2020 1609    Comment: OT role, benefits, POC, d/c planning                               User Key     Initials Effective Dates Name Provider Type Discipline     07/05/20 -  Minnie Mitchell, OTR/L Occupational Therapist OT     07/05/20 -  Mainor Og, OTR/L Occupational Therapist OT     07/16/20 -  Karyn Ortega OT Student OT Student OT                  OT Recommendation and Plan     Progress Toward Functional Goals (OT): progress toward functional goals is good  Plan of Care Review  Plan of Care Reviewed With: patient, spouse  Progress:  improving  Plan of Care Reviewed With: patient, spouse        Time Calculation:   Time Calculation- OT     Row Name 10/13/20 1033             Time Calculation- OT    OT Start Time  1033  -      OT Stop Time  1120  -      OT Time Calculation (min)  47 min  -      Total Timed Code Minutes- OT  47 minute(s)  -      OT Received On  10/13/20  -      OT Goal Re-Cert Due Date  10/21/20  -        User Key  (r) = Recorded By, (t) = Taken By, (c) = Cosigned By    Initials Name Provider Type     Minnie Mitchell OTR/L Occupational Therapist        Therapy Charges for Today     Code Description Service Date Service Provider Modifiers Qty    [id number] HC OT SELF CARE/MGMT/TRAIN EA 15 MIN 10/13/2020 Minnie Mitchell OTR/L GO 3               MARIA L Barry/JERAMY  10/13/2020

## 2020-10-13 NOTE — PLAN OF CARE
Problem: Adult Inpatient Plan of Care  Goal: Plan of Care Review  Outcome: Ongoing, Progressing  Flowsheets (Taken 10/13/2020 8550)  Progress: improving  Plan of Care Reviewed With: patient  Outcome Summary: Pt c/o left ankle/wound pain, given prn tylenol with relief. Wound care to bilateral lower extremities per orders. Encouraged pt to keep legs elevated, however he does not keep them up for long. Refusing tele. VSS. Room air. Safety maintained. Continue to monitor.

## 2020-10-13 NOTE — PROGRESS NOTES
"INFECTIOUS DISEASES PROGRESS NOTE    Patient:  Manuel Clark  YOB: 1955  MRN: [id number]   Admit date: 10/9/2020   Admitting Physician: Salomon Garber DO  Primary Care Physician: Kathy Granger APRN    Chief Complaint: \" I am ready to get back in bed\"        Interval History: The patient's nurse states patient has been better about elevating legs today     Allergies:   Allergies   Allergen Reactions   • Iodine Unknown - High Severity   • Shellfish-Derived Products Anaphylaxis   • Sulfa Antibiotics Hives       Current Meds:     Current Facility-Administered Medications:   •  acetaminophen (TYLENOL) tablet 650 mg, 650 mg, Oral, Q4H PRN, Juliocesar Guerra MD, 650 mg at 10/13/20 1726  •  apixaban (ELIQUIS) tablet 5 mg, 5 mg, Oral, Q12H, Cornelius Carrasco DO, 5 mg at 10/13/20 0950  •  atorvastatin (LIPITOR) tablet 80 mg, 80 mg, Oral, Daily, Juliocesar Guerra MD, 80 mg at 10/13/20 0950  •  clopidogrel (PLAVIX) tablet 75 mg, 75 mg, Oral, Daily, Juliocesar Guerra MD, 75 mg at 10/13/20 0950  •  dextrose (D50W) 25 g/ 50mL Intravenous Solution 25 g, 25 g, Intravenous, Q15 Min PRN, Juliocesar Guerra MD  •  dextrose (GLUTOSE) oral gel 15 g, 15 g, Oral, Q15 Min PRN, Juliocesar Guerra MD  •  glucagon (human recombinant) (GLUCAGEN DIAGNOSTIC) injection 1 mg, 1 mg, Subcutaneous, Q15 Min PRN, Juliocesar Guerra MD  •  influenza vac split quad (FLUZONE,FLUARIX,AFLURIA,FLULAVAL) injection 0.5 mL, 0.5 mL, Intramuscular, During Hospitalization, Juliocesar Guerra MD  •  insulin detemir (LEVEMIR) injection 20 Units, 20 Units, Subcutaneous, Daily, Julioceasr Guerra MD, 20 Units at 10/13/20 0951  •  insulin lispro (humaLOG) injection 2-7 Units, 2-7 Units, Subcutaneous, TID AC, Mari Juliocesar Yoni, MD, 3 Units at 10/13/20 1726  •  metoprolol tartrate (LOPRESSOR) tablet 50 mg, 50 mg, Oral, Q12H, Salomon Garber DO, 50 mg at 10/13/20 0950  •  nicotine " "(NICODERM CQ) 21 MG/24HR patch 1 patch, 1 patch, Transdermal, Q24H, Cornelius Carrasco DO, 1 patch at 10/13/20 1543  •  nystatin (MYCOSTATIN) powder, , Topical, Q12H, Cornelius Carrasco DO  •  pantoprazole (PROTONIX) EC tablet 40 mg, 40 mg, Oral, QAM AC, Juliocesar Guerra MD, 40 mg at 10/13/20 0630  •  [COMPLETED] Insert peripheral IV, , , Once **AND** sodium chloride 0.9 % flush 10 mL, 10 mL, Intravenous, PRN, Shoulders, Kristee Croft, APRN  •  sodium chloride 0.9 % flush 10 mL, 10 mL, Intravenous, Q12H, Juliocesar Guerra MD, 10 mL at 10/12/20 2149  •  sodium chloride 0.9 % infusion, 75 mL/hr, Intravenous, Continuous, Cornelius Carrasco DO, Last Rate: 75 mL/hr at 10/12/20 2157, 75 mL/hr at 10/12/20 2157  •  sodium chloride nasal spray 1 spray, 1 spray, Each Nare, PRN, Juliocesar Guerra MD, 1 spray at 10/10/20 0412      Review of Systems  General: No fever  Cutaneous: Lower extremity wounds    Objective     Vital Signs:  Temp (24hrs), Av.2 °F (36.8 °C), Min:97.6 °F (36.4 °C), Max:99.8 °F (37.7 °C)      /97 (BP Location: Right arm, Patient Position: Sitting)   Pulse 80   Temp 97.6 °F (36.4 °C) (Oral)   Resp 16   Ht 172.7 cm (67.99\")   Wt 116 kg (256 lb 12.8 oz)   SpO2 96%   BMI 39.06 kg/m²         Physical Exam   General: The patient is a morbidly obese male sitting up in recliner with feet on the floor.  Abdomen: Morbidly obese  Lower extremities with no significant change in edema now.  Additionally there is still the deeper erythema noted of the left foot and lower leg when compared to the right.    Neuro: Alert, oriented, speech clear  Psych: Pleasant and cooperative                Results Review:    I reviewed the patient's new clinical results.    Lab Results:  CBC:   Lab Results   Lab 10/09/20  1422 10/10/20  0556 10/11/20  0247 10/12/20  0334   WBC 9.06 7.97 8.25 10.42   HEMOGLOBIN 10.0* 10.1* 10.0* 9.4*   HEMATOCRIT 31.4* 32.2* 32.0* 29.3*   PLATELETS 455* 466* 474* 469*       "         CMP:   Lab Results   Lab 10/09/20  1609  10/11/20  0247 10/12/20  0334 10/13/20  0429   SODIUM 135*   < > 138 139 141   POTASSIUM 5.3*   < > 4.2 4.4 3.8   CHLORIDE 102   < > 106 106 107   CO2 21.0*   < > 25.0 24.0 25.0   BUN 73*   < > 45* 21 17   CREATININE 2.91*   < > 1.28* 0.88 0.71*   CALCIUM 9.6   < > 9.9 9.5 9.6   BILIRUBIN 0.3  --   --   --   --    ALK PHOS 22*  --   --   --   --    ALT (SGPT) 17  --   --   --   --    AST (SGOT) 21  --   --   --   --    GLUCOSE 104*   < > 93 120* 110*    < > = values in this interval not displayed.         Culture Results:    Blood Culture   Date Value Ref Range Status   10/09/2020 No growth at 2 days  Preliminary     Wound Culture   Date Value Ref Range Status   10/09/2020 Light growth (2+) Staphylococcus aureus, MRSA (A)  Preliminary     Comment:     Methicillin resistant Staphylococcus aureus, Patient may be an isolation risk.   10/09/2020 Scant growth (1+) Gram Negative Bacilli (A)  Preliminary   10/09/2020 Rare Gram Negative Bacilli (A)  Preliminary       Microbiology Results Abnormal     Procedure Component Value - Date/Time    Blood Culture With RONALDO - Blood, Arm, Left [[id number]] Collected: 10/09/20 1422    Lab Status: Preliminary result Specimen: Blood from Arm, Left Updated: 10/13/20 1445     Blood Culture No growth at 4 days    Wound Culture - Wound, Leg, Left [[id number]]  (Abnormal)  (Susceptibility) Collected: 10/09/20 1609    Lab Status: Preliminary result Specimen: Wound from Leg, Left Updated: 10/13/20 0820     Wound Culture Light growth (2+) Staphylococcus aureus, MRSA     Comment: Methicillin resistant Staphylococcus aureus, Patient may be an isolation risk.         Scant growth (1+) Achromobacter xylosoxidans      Rare Stenotrophomonas maltophilia     Gram Stain No WBCs or organisms seen    Narrative:      Ceftazidime to follow     Susceptibility      Staphylococcus aureus, MRSA     RACHEL     Clindamycin Resistant     Erythromycin Resistant      Inducible Clindamycin Resistance Negative     Oxacillin Resistant     Penicillin G Resistant     Rifampin Susceptible     Tetracycline Resistant     Trimethoprim + Sulfamethoxazole Susceptible     Vancomycin Susceptible                Susceptibility      Achromobacter xylosoxidans     RACHEL     Cefepime Susceptible     Ceftazidime Susceptible     Gentamicin Resistant     Levofloxacin Intermediate     Meropenem Susceptible     Piperacillin + Tazobactam Susceptible     Tetracycline Resistant     Tobramycin Intermediate                Susceptibility      Stenotrophomonas maltophilia     RACHEL     Levofloxacin Resistant     Trimethoprim + Sulfamethoxazole Resistant                    COVID PRE-OP / PRE-PROCEDURE SCREENING ORDER (NO ISOLATION) - Swab, Nasal Cavity [[id number]]  (Normal) Collected: 10/09/20 1424    Lab Status: Final result Specimen: Swab from Nasal Cavity Updated: 10/09/20 1518    Narrative:      The following orders were created for panel order COVID PRE-OP / PRE-PROCEDURE SCREENING ORDER (NO ISOLATION) - Swab, Nasal Cavity.  Procedure                               Abnormality         Status                     ---------                               -----------         ------                     COVID-19, [facility] IN-HOUS...[[id number]]  Normal              Final result                 Please view results for these tests on the individual orders.    COVID-19, [facility]BBOTT IN-HOUSE,NP Swab (NO TRANSPORT MEDIA) 2 HR TAT - Swab, Nasal Cavity [[id number]]  (Normal) Collected: 10/09/20 1424    Lab Status: Final result Specimen: Swab from Nasal Cavity Updated: 10/09/20 1518     COVID19 Not Detected    Narrative:      Fact sheet for providers: https://www.fda.gov/media/063999/download     Fact sheet for patients: https://www.fda.gov/media/423466/download                Radiology:   Imaging Results (Last 72 Hours)     Procedure Component Value Units Date/Time    US Venous Doppler Lower Extremity Bilateral (duplex) [[id number]]  Collected: 10/11/20 0907     Updated: 10/11/20 0913    Narrative:      History: Swelling       Impression:      Impression: There is evidence of partially occlusive deep venous  thrombosis of the left superficial femoral vein. All other visualized  veins show no evidence of DVT.     Comments: Bilateral lower extremity venous duplex exam was performed  using color Doppler flow, Doppler waveform analysis, and grayscale  imaging, with and without compression. There is evidence of partially  occlusive DVT in a segment of the left superficial femoral vein.  Otherwise all visualized veins show no evidence of DVT. No thrombus is  identified in the saphenofemoral junctions and greater saphenous veins  bilaterally.         This report was finalized on 10/11/2020 09:10 by Dr. Michael Shaikh MD.          Assessment/Plan     Active Hospital Problems    Diagnosis   • **Cellulitis of left leg   • Essential hypertension   • Coronary artery disease with history of PCI and stent placement   • Acute renal failure (ARF) (CMS/[facility])   • Type 2 diabetes mellitus with hyperglycemia, with long-term current use of insulin (CMS/[facility])   • Diabetic neuropathy (CMS/[facility])   • Acute deep vein thrombosis (DVT) of femoral vein of left lower extremity (CMS/[facility])   • Chronic bilateral low back pain without sciatica   • BMI 40.0-44.9, adult (CMS/[facility])   • Tobacco abuse       IMPRESSION:  Chronic venous stasis changes in a morbidly obese male treated for cellulitis for the past several months.  Patient without chills or fever or leukocytosis so leaning more towards chronic venous stasis changes over acute on chronic cellulitis.  Very hard to evaluate with such edema and patient who will not allow compression.    Acute kidney injury-significantly improved      RECOMMENDATION:   -Elevate lower extremities above the level of the heart continues to be my primary recommendation.  I discussed this again with patient and nurse - plan to elevate foot of mattress  even further    Would recommend skin care regimen of his lower extremities and treatment of onychomycosis with Lamisil and monitoring per primary provider or wound care.      Danitza Hidalgo MD  10/13/20  18:40 CDT

## 2020-10-13 NOTE — PROGRESS NOTES
Continued Stay Note   [place]     Patient Name: Manuel Clark  MRN: [id number]  Today's Date: 10/13/2020    Admit Date: 10/9/2020    Discharge Plan     Row Name 10/13/20 0820       Plan    Plan  SNF    Patient/Family in Agreement with Plan  yes    Plan Comments  SW has left voicemail for admissions at [facility] [phone] to check on referral status.  BESSY Pratt.        Discharge Codes    No documentation.             BESSY Lowe

## 2020-10-13 NOTE — PLAN OF CARE
Goal Outcome Evaluation:  Plan of Care Reviewed With: patient  Progress: no change   Pt. A&O. Pleasant and following instruction w/ no argument for this shift. LE dressings changed by Duane Bustamante RN this shift before getting here. Pt has elevated LE more. VSS. Safety maintained.

## 2020-10-13 NOTE — THERAPY TREATMENT NOTE
Acute Care - Physical Therapy Treatment Note  [facility]     Patient Name: Manuel Clark  : 1955  MRN: [id number]  Today's Date: 10/13/2020           PT Assessment (last 12 hours)      PT Evaluation and Treatment     Row Name 10/13/20 1413 10/13/20 0815       Physical Therapy Time and Intention    Subjective Information  complains of;pain  -TB  no complaints  -TB    Document Type  therapy note (daily note)  -TB  therapy note (daily note)  -TB    Mode of Treatment  physical therapy  -TB  physical therapy  -TB    Patient Effort  good  -TB  good  -TB    Row Name 10/13/20 1413          Pain Scale: Numbers Pre/Post-Treatment    Pretreatment Pain Rating  5/10  -TB     Posttreatment Pain Rating  5/10  -TB     Pain Location  foot  -TB     Pre/Posttreatment Pain Comment  Pain and spasms in R foot/ankle  -TB     Row Name 10/13/20 1413 10/13/20 0815       Bed Mobility    Comment (Bed Mobility)  Up in chair  -TB  Up in chair  -TB    Row Name 10/13/20 1413 10/13/20 0815       Transfers    Transfers  sit-stand transfer;stand-sit transfer  -TB  sit-stand transfer;stand-sit transfer  -TB    Comment (Transfers)  --  x2  -TB    Sit-Stand [place] (Transfers)  contact guard;minimum assist (75% patient effort);verbal cues  -TB  minimum assist (75% patient effort);verbal cues  -TB    Stand-Sit [place] (Transfers)  contact guard;minimum assist (75% patient effort);verbal cues  -TB  minimum assist (75% patient effort);verbal cues  -TB    Row Name 10/13/20 1413 10/13/20 0815       Sit-Stand Transfer    Assistive Device (Sit-Stand Transfers)  walker, 4-wheeled  -TB  walker, 4-wheeled  -TB    Row Name 10/13/20 1413 10/13/20 0815       Stand-Sit Transfer    Assistive Device (Stand-Sit Transfers)  walker, 4-wheeled  -TB  walker, 4-wheeled  -TB    Row Name 10/13/20 1413 10/13/20 0815       Gait/Stairs (Locomotion)    Gait/Stairs Locomotion  gait/ambulation assistive device  -TB  gait/ambulation assistive device  -TB     [place] Level (Gait)  contact guard;verbal cues  -TB  contact guard;verbal cues  -TB    Assistive Device (Gait)  walker, 4-wheeled  -TB  walker, 4-wheeled  -TB    Distance in Feet (Gait)  25' x3  -TB  20'x2, 25'x2  -TB    Pattern (Gait)  step-to  -TB  step-to  -TB    Deviations/Abnormal Patterns (Gait)  festinating/shuffling;gait speed decreased;stride length decreased  -TB  base of support, wide;festinating/shuffling;gait speed decreased;stride length decreased  -TB    Bilateral Gait Deviations  heel strike decreased  -TB  heel strike decreased  -TB    Comment (Gait/Stairs)  Standing rest breaks as needed  -TB  --    Row Name 10/13/20 0815          Balance    Balance Assessment  sitting static balance;standing static balance;standing dynamic balance  -TB     Static Sitting Balance  WFL;sitting in chair  -TB     Static Standing Balance  WFL;supported;standing;other (see comments) W/ rollator  -TB     Dynamic Standing Balance  WFL;supported;standing;other (see comments) W/ rollator  -TB     Comment, Balance  Requires CGA when first standing to gain balance  -TB     Row Name 10/13/20 0815          Motor Skills    Therapeutic Exercise  other (see comments) BLE exercises 1x15, AROM RLE and AAROM LLE  -TB     Row Name             Wound 10/09/20 2254 Right lower perineum Pressure Injury    Wound - Properties Group Placement Date: 10/09/20  -OF Placement Time: 2254  -OF Present on Hospital Admission: Y  -OF Side: Right  -OF Orientation: lower  -OF Location: perineum  -OF Primary Wound Type: Pressure inj  -OF    Retired Wound - Properties Group Date first assessed: 10/09/20  -OF Time first assessed: 2254  -OF Present on Hospital Admission: Y  -OF Side: Right  -OF Location: perineum  -OF Primary Wound Type: Pressure inj  -OF    Row Name             Wound 10/10/20 0237 Left distal leg    Wound - Properties Group Placement Date: 10/10/20  -OF Placement Time: 0237  -OF Present on Hospital Admission: Y  -OF Side: Left  -OF  Orientation: distal  -OF Location: leg  -OF    Retired Wound - Properties Group Date first assessed: 10/10/20  -OF Time first assessed: 0237  -OF Present on Hospital Admission: Y  -OF Side: Left  -OF Location: leg  -OF    Row Name             Wound 10/10/20 0255 Right medial thigh    Wound - Properties Group Placement Date: 10/10/20  -OF Placement Time: 0255  -OF Side: Right  -OF Orientation: medial  -OF Location: thigh  -OF    Retired Wound - Properties Group Date first assessed: 10/10/20  -OF Time first assessed: 0255  -OF Side: Right  -OF Location: thigh  -OF    Row Name 10/13/20 0815          Plan of Care Review    Plan of Care Reviewed With  patient  -TB     Progress  improving  -TB     Outcome Summary  Pt agreeable to therapy w/ no complaints. Pt needed Max A donning socks. Pt c/o of pain when therapist touched the back of L ankle. Pt stated he has an open sore there. Sit<>stand Min A. Ambulated 20'x2 w/ CGA and rollator. Pt c/o of the pain w/ WB in L ankle. Pt ambulated another 25'x2 after sitting rest break. Cont. current POC.  -TB     Row Name 10/13/20 1413 10/13/20 0815       Positioning and Restraints    Pre-Treatment Position  sitting in chair/recliner  -TB  sitting in chair/recliner  -TB    Post Treatment Position  chair  -TB  chair  -TB    In Chair  sitting;call light within reach;encouraged to call for assist;legs elevated  -TB  sitting;call light within reach;encouraged to call for assist;legs elevated  -TB      User Key  (r) = Recorded By, (t) = Taken By, (c) = Cosigned By    Initials Name Provider Type    TB Brad Salmon, PTA Physical Therapy Assistant    OF Rica Almeida RN Registered Nurse        Physical Therapy Education                 Title: PT OT SLP Therapies (In Progress)     Topic: Physical Therapy (In Progress)     Point: Mobility training (Done)     Learning Progress Summary           Patient Acceptance, E VU by CC at 10/12/2020 1304    Comment: Pt educated on PT's role in  POC                   Point: Home exercise program (Not Started)     Learner Progress:  Not documented in this visit.          Point: Body mechanics (Not Started)     Learner Progress:  Not documented in this visit.          Point: Precautions (Not Started)     Learner Progress:  Not documented in this visit.                      User Key     Initials Effective Dates Name Provider Type Discipline    CC 09/02/20 -  Cristiana Helton, PT Student PT Student PT              PT Recommendation and Plan     Plan of Care Reviewed With: patient  Progress: improving  Outcome Summary: Pt agreeable to therapy w/ no complaints. Pt needed Max A donning socks. Pt c/o of pain when therapist touched the back of L ankle. Pt stated he has an open sore there. Sit<>stand Min A. Ambulated 20'x2 w/ CGA and rollator. Pt c/o of the pain w/ WB in L ankle. Pt ambulated another 25'x2 after sitting rest break. Cont. current POC.       Time Calculation:   PT Charges     Row Name 10/13/20 1438 10/13/20 0905          Time Calculation    Start Time  1413  -TB  0815  -TB     Stop Time  1436  -TB  0855  -TB     Time Calculation (min)  23 min  -TB  40 min  -TB     PT Received On  10/13/20  -TB  10/13/20  -TB        Time Calculation- PT    Total Timed Code Minutes- PT  23 minute(s)  -TB  40 minute(s)  -TB       User Key  (r) = Recorded By, (t) = Taken By, (c) = Cosigned By    Initials Name Provider Type    TB Brad Salmon, PTA Physical Therapy Assistant        Therapy Charges for Today     Code Description Service Date Service Provider Modifiers Qty    01646035012 HC GAIT TRAINING EA 15 MIN 10/13/2020 Brad Salmon, PTA GP 2    00187293181 HC PT THER PROC EA 15 MIN 10/13/2020 Brad Salmon, PTA GP 1    07888080517 HC GAIT TRAINING EA 15 MIN 10/13/2020 Brad Salmon, PTA GP 2          PT G-Codes  Outcome Measure Options: AM-PAC 6 Clicks Basic Mobility (PT)  AM-PAC 6 Clicks Score (PT): 13  AM-PAC 6 Clicks Score (OT):  18    Brad Salmon, PTA  10/13/2020

## 2020-10-13 NOTE — THERAPY TREATMENT NOTE
Acute Care - Physical Therapy Treatment Note  [facility]     Patient Name: Manuel Clark  : 1955  MRN: [id number]  Today's Date: 10/13/2020           PT Assessment (last 12 hours)      PT Evaluation and Treatment     Row Name 10/13/20 0815          Physical Therapy Time and Intention    Subjective Information  no complaints  -TB     Document Type  therapy note (daily note)  -TB     Mode of Treatment  physical therapy  -TB     Patient Effort  good  -TB     Row Name 10/13/20 0815          Bed Mobility    Comment (Bed Mobility)  Up in chair  -TB     Row Name 10/13/20 0815          Transfers    Transfers  sit-stand transfer;stand-sit transfer  -TB     Comment (Transfers)  x2  -TB     Sit-Stand [place] (Transfers)  minimum assist (75% patient effort);verbal cues  -TB     Stand-Sit [place] (Transfers)  minimum assist (75% patient effort);verbal cues  -TB     Row Name 10/13/20 0815          Sit-Stand Transfer    Assistive Device (Sit-Stand Transfers)  walker, 4-wheeled  -TB     Row Name 10/13/20 0815          Stand-Sit Transfer    Assistive Device (Stand-Sit Transfers)  walker, 4-wheeled  -TB     Row Name 10/13/20 0815          Gait/Stairs (Locomotion)    Gait/Stairs Locomotion  gait/ambulation assistive device  -     [place] Level (Gait)  contact guard;verbal cues  -TB     Assistive Device (Gait)  walker, 4-wheeled  -TB     Distance in Feet (Gait)  20'x2, 25'x2  -TB     Pattern (Gait)  step-to  -TB     Deviations/Abnormal Patterns (Gait)  base of support, wide;festinating/shuffling;gait speed decreased;stride length decreased  -TB     Bilateral Gait Deviations  heel strike decreased  -     Row Name 10/13/20 0815          Balance    Balance Assessment  sitting static balance;standing static balance;standing dynamic balance  -TB     Static Sitting Balance  WFL;sitting in chair  -TB     Static Standing Balance  WFL;supported;standing;other (see comments) W/ rollator  -TB     Dynamic Standing Balance   WFL;supported;standing;other (see comments) W/ rollator  -TB     Comment, Balance  Requires CGA when first standing to gain balance  -TB     Row Name 10/13/20 0815          Motor Skills    Therapeutic Exercise  other (see comments) BLE exercises 1x15, AROM RLE and AAROM LLE  -TB     Row Name             Wound 10/09/20 2254 Right lower perineum Pressure Injury    Wound - Properties Group Placement Date: 10/09/20  -OF Placement Time: 2254  -OF Present on Hospital Admission: Y  -OF Side: Right  -OF Orientation: lower  -OF Location: perineum  -OF Primary Wound Type: Pressure inj  -OF    Retired Wound - Properties Group Date first assessed: 10/09/20  -OF Time first assessed: 2254  -OF Present on Hospital Admission: Y  -OF Side: Right  -OF Location: perineum  -OF Primary Wound Type: Pressure inj  -OF    Row Name             Wound 10/10/20 0237 Left distal leg    Wound - Properties Group Placement Date: 10/10/20  -OF Placement Time: 0237  -OF Present on Hospital Admission: Y  -OF Side: Left  -OF Orientation: distal  -OF Location: leg  -OF    Retired Wound - Properties Group Date first assessed: 10/10/20  -OF Time first assessed: 0237  -OF Present on Hospital Admission: Y  -OF Side: Left  -OF Location: leg  -OF    Row Name             Wound 10/10/20 0255 Right medial thigh    Wound - Properties Group Placement Date: 10/10/20  -OF Placement Time: 0255  -OF Side: Right  -OF Orientation: medial  -OF Location: thigh  -OF    Retired Wound - Properties Group Date first assessed: 10/10/20  -OF Time first assessed: 0255  -OF Side: Right  -OF Location: thigh  -OF    Row Name 10/13/20 0815          Plan of Care Review    Plan of Care Reviewed With  patient  -TB     Progress  improving  -TB     Outcome Summary  Pt agreeable to therapy w/ no complaints. Pt needed Max A donning socks. Pt c/o of pain when therapist touched the back of L ankle. Pt stated he has an open sore there. Sit<>stand Min A. Ambulated 20'x2 w/ CGA and rollator. Pt  c/o of the pain w/ WB in L ankle. Pt ambulated another 25'x2 after sitting rest break. Cont. current POC.  -TB     Row Name 10/13/20 0815          Positioning and Restraints    Pre-Treatment Position  sitting in chair/recliner  -TB     Post Treatment Position  chair  -TB     In Chair  sitting;call light within reach;encouraged to call for assist;legs elevated  -TB       User Key  (r) = Recorded By, (t) = Taken By, (c) = Cosigned By    Initials Name Provider Type    TB Brad Salmon, PTA Physical Therapy Assistant    OF Rica Almeida RN Registered Nurse        Physical Therapy Education                 Title: PT OT SLP Therapies (In Progress)     Topic: Physical Therapy (In Progress)     Point: Mobility training (Done)     Learning Progress Summary           Patient Acceptance, E, VU by  at 10/12/2020 1304    Comment: Pt educated on PT's role in POC                   Point: Home exercise program (Not Started)     Learner Progress:  Not documented in this visit.          Point: Body mechanics (Not Started)     Learner Progress:  Not documented in this visit.          Point: Precautions (Not Started)     Learner Progress:  Not documented in this visit.                      User Key     Initials Effective Dates Name Provider Type Discipline     09/02/20 -  Cristiana Helton, PT Student PT Student PT              PT Recommendation and Plan     Plan of Care Reviewed With: patient  Progress: improving  Outcome Summary: Pt agreeable to therapy w/ no complaints. Pt needed Max A donning socks. Pt c/o of pain when therapist touched the back of L ankle. Pt stated he has an open sore there. Sit<>stand Min A. Ambulated 20'x2 w/ CGA and rollator. Pt c/o of the pain w/ WB in L ankle. Pt ambulated another 25'x2 after sitting rest break. Cont. current POC.       Time Calculation:   PT Charges     Row Name 10/13/20 0905             Time Calculation    Start Time  0815  -TB      Stop Time  0855  -TB      Time Calculation  (min)  40 min  -TB      PT Received On  10/13/20  -TB         Time Calculation- PT    Total Timed Code Minutes- PT  40 minute(s)  -TB        User Key  (r) = Recorded By, (t) = Taken By, (c) = Cosigned By    Initials Name Provider Type    TB Brad Salmon, TERESA Physical Therapy Assistant        Therapy Charges for Today     Code Description Service Date Service Provider Modifiers Qty    17769095924 HC GAIT TRAINING EA 15 MIN 10/13/2020 Brad Salmon PTA GP 2    46977544913 HC PT THER PROC EA 15 MIN 10/13/2020 Brad Salmon, TERESA GP 1          PT G-Codes  Outcome Measure Options: AM-PAC 6 Clicks Basic Mobility (PT)  AM-PAC 6 Clicks Score (PT): 13  AM-PAC 6 Clicks Score (OT): 18    Brad Salmon PTA  10/13/2020

## 2020-10-13 NOTE — PROGRESS NOTES
Continued Stay Note  [facility]     Patient Name: Manuel Clark  MRN: [id number]  Today's Date: 10/13/2020    Admit Date: 10/9/2020    Discharge Plan     Row Name 10/13/20 0910       Plan    Plan  [facility]    Patient/Family in Agreement with Plan  yes    Plan Comments  Genevieve from [facility] has offered a bed on pt.  Pt will require an isolation bed due to MRSA.  They do not have an isolation bed open today, but will tomorrow.  MELISA will follow to arrange dc.  Phone:  [phone], fax: [phone].  BESSY Pratt.    Row Name 10/13/20 0820       Plan    Plan  SNF    Patient/Family in Agreement with Plan  yes    Plan Comments  MELISA has left voicemail for admissions at [facility] [phone] to check on referral status.  BESSY Pratt.        Discharge Codes    No documentation.             BESSY Lowe

## 2020-10-13 NOTE — PLAN OF CARE
Problem: Adult Inpatient Plan of Care  Goal: Plan of Care Review  Recent Flowsheet Documentation  Taken 10/13/2020 0815 by Brad Salmon PTA  Progress: improving  Plan of Care Reviewed With: patient  Outcome Summary: Pt agreeable to therapy. Pt needed Max A donning socks. Pt c/o of pain when therapist touched the back of L ankle. Pt stated he has an open sore under the bandage. Sit<>stand Min A. Ambulated 20'x2 w/ CGA and rollator. Pt c/o of increased pain w/ WB in L ankle. Pt ambulated another 25'x2 after sitting rest break. Cont. current POC.

## 2020-10-14 VITALS
DIASTOLIC BLOOD PRESSURE: 91 MMHG | OXYGEN SATURATION: 95 % | HEIGHT: 68 IN | TEMPERATURE: 98.2 F | SYSTOLIC BLOOD PRESSURE: 154 MMHG | RESPIRATION RATE: 18 BRPM | WEIGHT: 256.8 LBS | HEART RATE: 85 BPM | BODY MASS INDEX: 38.92 KG/M2

## 2020-10-14 LAB
ANION GAP SERPL CALCULATED.3IONS-SCNC: 8 MMOL/L (ref 5–15)
BACTERIA SPEC AEROBE CULT: ABNORMAL
BACTERIA SPEC AEROBE CULT: NORMAL
BUN SERPL-MCNC: 13 MG/DL (ref 8–23)
BUN/CREAT SERPL: 20.3 (ref 7–25)
CALCIUM SPEC-SCNC: 9.4 MG/DL (ref 8.6–10.5)
CHLORIDE SERPL-SCNC: 103 MMOL/L (ref 98–107)
CO2 SERPL-SCNC: 26 MMOL/L (ref 22–29)
CREAT SERPL-MCNC: 0.64 MG/DL (ref 0.76–1.27)
GFR SERPL CREATININE-BSD FRML MDRD: 126 ML/MIN/1.73
GLUCOSE BLDC GLUCOMTR-MCNC: 149 MG/DL (ref 70–130)
GLUCOSE BLDC GLUCOMTR-MCNC: 179 MG/DL (ref 70–130)
GLUCOSE SERPL-MCNC: 117 MG/DL (ref 65–99)
GRAM STN SPEC: ABNORMAL
POTASSIUM SERPL-SCNC: 3.7 MMOL/L (ref 3.5–5.2)
SARS-COV-2 RDRP RESP QL NAA+PROBE: NOT DETECTED
SODIUM SERPL-SCNC: 137 MMOL/L (ref 136–145)

## 2020-10-14 PROCEDURE — 87635 SARS-COV-2 COVID-19 AMP PRB: CPT | Performed by: FAMILY MEDICINE

## 2020-10-14 PROCEDURE — 82962 GLUCOSE BLOOD TEST: CPT

## 2020-10-14 PROCEDURE — 63710000001 INSULIN DETEMIR PER 5 UNITS: Performed by: FAMILY MEDICINE

## 2020-10-14 PROCEDURE — 80048 BASIC METABOLIC PNL TOTAL CA: CPT | Performed by: FAMILY MEDICINE

## 2020-10-14 PROCEDURE — 63710000001 INSULIN LISPRO (HUMAN) PER 5 UNITS: Performed by: FAMILY MEDICINE

## 2020-10-14 RX ORDER — LISINOPRIL 10 MG/1
10 TABLET ORAL DAILY
Start: 2020-10-14

## 2020-10-14 RX ADMIN — APIXABAN 5 MG: 5 TABLET, FILM COATED ORAL at 09:18

## 2020-10-14 RX ADMIN — PANTOPRAZOLE SODIUM 40 MG: 40 TABLET, DELAYED RELEASE ORAL at 09:17

## 2020-10-14 RX ADMIN — METOPROLOL TARTRATE 50 MG: 50 TABLET, FILM COATED ORAL at 09:17

## 2020-10-14 RX ADMIN — INSULIN DETEMIR 20 UNITS: 100 INJECTION, SOLUTION SUBCUTANEOUS at 09:18

## 2020-10-14 RX ADMIN — CLOPIDOGREL BISULFATE 75 MG: 75 TABLET, FILM COATED ORAL at 09:18

## 2020-10-14 RX ADMIN — ACETAMINOPHEN 650 MG: 325 TABLET, FILM COATED ORAL at 09:17

## 2020-10-14 RX ADMIN — INSULIN LISPRO 2 UNITS: 100 INJECTION, SOLUTION INTRAVENOUS; SUBCUTANEOUS at 11:29

## 2020-10-14 RX ADMIN — ATORVASTATIN CALCIUM 80 MG: 40 TABLET, FILM COATED ORAL at 09:18

## 2020-10-14 NOTE — DISCHARGE SUMMARY
[facility] Medicine Services  DISCHARGE SUMMARY       Date of Admission: 10/9/2020  Date of Discharge:  10/14/2020  Primary Care Physician: Kathy Granger APRN    Discharge Diagnoses:  Active Hospital Problems    Diagnosis   • **Cellulitis of left leg   • Essential hypertension   • Coronary artery disease with history of PCI and stent placement   • Acute renal failure (ARF) (CMS/[facility])   • Type 2 diabetes mellitus with hyperglycemia, with long-term current use of insulin (CMS/[facility])   • Diabetic neuropathy (CMS/[facility])   • Acute deep vein thrombosis (DVT) of femoral vein of left lower extremity (CMS/[facility])   • Chronic bilateral low back pain without sciatica   • BMI 40.0-44.9, adult (CMS/[facility])   • Tobacco abuse         Presenting Problem/History of Present Illness:  MONI (acute kidney injury) (CMS/[facility]) [N17.9]     Chief Complaint on Day of Discharge:   Bilateral lower extremity erythema and weakness    History of Present Illness on Day of Discharge:   No new complaints today.  The patient has been accepted to SNF for further wound care and rehabilitation secondary to generalized weakness and gait instability with debilitation.  He is appropriate for discharge today.    Hospital Course  Patient was admitted on 10/9 with complaints of swelling, discomfort and redness of the left lower extremity and lower extremity weakness.  The patient previously had outpatient treatment for cellulitis with clindamycin and ciprofloxacin.  He was found to have a left femoral DVT on duplex in the emergency department and found to be in acute renal failure.  He was initially anticoagulated with Lovenox and was transitioned to oral Eliquis on 10/10.  He was started on vancomycin and Zosyn at the time of admission.  Chronic stasis dermatitis was present.  Infectious diseases was consulted as the patient already had an outpatient appointment scheduled with them.  They recommended discontinuation of antibiotic  therapy as there was no clinical evidence of infectious process.  Recommendations were for leg elevation and compression.  The patient stated that he would not accept in Unna boots any longer.  As such, the patient understands that his situation will be extremely difficult to manage and to improve.  Serum creatinine gradually improved throughout his hospital stay and is now baseline of 0.71.  Dyazide, valsartan and spironolactone were held.  Plavix was continued with full dose anticoagulation given the patient's history of cardiac stenting.  The patient was noncompliant with leg elevation throughout his hospital stay.  He continued to refuse Unna boots.  I eventually convinced the patient to allow the application of Kerlix dressing with Ace overwrap to provide gentle compression.  His legs were improving thereafter.  The patient was unable to ambulate well because of lower extremity weakness likely secondary to lumbar stenosis.  He has appointments with neurosurgery on an outpatient basis and is currently being evaluated by that service.  He is stable for discharge to SNF today for further wound care and rehab.        Consults:   Infectious diseases:  IMPRESSION:  Mild extremity edema/erythema/weeping of serous fluid.  Erythema fairly intense and could be consistent with cellulitis, however patient has normal white count, no fever and reports no chills or fever prior to admission.  Suspect this is more consistent with chronic venous stasis and patient who does not tolerate compression wrapping of his lower extremities, does not elevate his extremities at the level of the heart or even above the level heart.     His skin has various areas that are not intact and that coupled with his severe onychomycosis certainly predisposes him to cellulitis.        RECOMMENDATION:   · ELEVATE LE above the level of heart  · Would not be opposed to stopping vancomycin/zosyn and monitoring patient clinically with elevation of the  lower extremities.     Discussed with nursing.  They will going toSee how to maneuver the new bed to get the feet above the level of the heart.  I also asked the patient to always recline and elevate the foot of the recliner if he is out of bed.     Explained to the patient did not think he will have resolution of this problem at all until he gets control of the LE fluid.  I do not think diuresing alone is the answer, however compression elevation will likely make significant difference.     I would also recommend that he be treated for his onychomycosis with oral Lamisil by his PCP and this could be done as an outpatient so labs could be monitored.              Danitza Hidalgo MD    Pertinent Test Results:   Lab Results (last 7 days)     Procedure Component Value Units Date/Time    POC Glucose Once [[id number]]  (Abnormal) Collected: 10/14/20 0726    Specimen: Blood Updated: 10/14/20 0805     Glucose 149 mg/dL     Basic Metabolic Panel [[id number]]  (Abnormal) Collected: 10/14/20 0502    Specimen: Blood Updated: 10/14/20 0552     Glucose 117 mg/dL      BUN 13 mg/dL      Creatinine 0.64 mg/dL      Sodium 137 mmol/L      Potassium 3.7 mmol/L      Chloride 103 mmol/L      CO2 26.0 mmol/L      Calcium 9.4 mg/dL      eGFR Non African Amer 126 mL/min/1.73      BUN/Creatinine Ratio 20.3     Anion Gap 8.0 mmol/L     Narrative:      GFR Normal >60  Chronic Kidney Disease <60  Kidney Failure <15      POC Glucose Once [[id number]]  (Abnormal) Collected: 10/13/20 1958    Specimen: Blood Updated: 10/13/20 2009     Glucose 177 mg/dL     POC Glucose Once [[id number]]  (Abnormal) Collected: 10/13/20 1545    Specimen: Blood Updated: 10/13/20 1601     Glucose 209 mg/dL     Blood Culture With RONALDO - Blood, Arm, Left [[id number]] Collected: 10/09/20 1422    Specimen: Blood from Arm, Left Updated: 10/13/20 1445     Blood Culture No growth at 4 days    POC Glucose Once [[id number]]  (Abnormal) Collected: 10/13/20 1139    Specimen: Blood  Updated: 10/13/20 1151     Glucose 144 mg/dL     Wound Culture - Wound, Leg, Left [[id number]]  (Abnormal)  (Susceptibility) Collected: 10/09/20 1609    Specimen: Wound from Leg, Left Updated: 10/13/20 0820     Wound Culture Light growth (2+) Staphylococcus aureus, MRSA     Comment: Methicillin resistant Staphylococcus aureus, Patient may be an isolation risk.         Scant growth (1+) Achromobacter xylosoxidans      Rare Stenotrophomonas maltophilia     Gram Stain No WBCs or organisms seen    Narrative:      Ceftazidime to follow     Susceptibility      Staphylococcus aureus, MRSA     RACHEL     Clindamycin Resistant     Erythromycin Resistant     Inducible Clindamycin Resistance Negative     Oxacillin Resistant     Penicillin G Resistant     Rifampin Susceptible     Tetracycline Resistant     Trimethoprim + Sulfamethoxazole Susceptible     Vancomycin Susceptible                Susceptibility      Achromobacter xylosoxidans     RACHEL     Cefepime Susceptible     Ceftazidime Susceptible     Gentamicin Resistant     Levofloxacin Intermediate     Meropenem Susceptible     Piperacillin + Tazobactam Susceptible     Tetracycline Resistant     Tobramycin Intermediate                Susceptibility      Stenotrophomonas maltophilia     RACHEL     Levofloxacin Resistant     Trimethoprim + Sulfamethoxazole Resistant                    POC Glucose Once [[id number]]  (Normal) Collected: 10/13/20 0751    Specimen: Blood Updated: 10/13/20 0817     Glucose 102 mg/dL     Basic Metabolic Panel [[id number]]  (Abnormal) Collected: 10/13/20 0429    Specimen: Blood Updated: 10/13/20 0534     Glucose 110 mg/dL      BUN 17 mg/dL      Creatinine 0.71 mg/dL      Sodium 141 mmol/L      Potassium 3.8 mmol/L      Chloride 107 mmol/L      CO2 25.0 mmol/L      Calcium 9.6 mg/dL      eGFR Non African Amer 112 mL/min/1.73      BUN/Creatinine Ratio 23.9     Anion Gap 9.0 mmol/L     Narrative:      GFR Normal >60  Chronic Kidney Disease <60  Kidney Failure  <15      POC Glucose Once [[id number]]  (Normal) Collected: 10/12/20 2014    Specimen: Blood Updated: 10/12/20 2025     Glucose 123 mg/dL     POC Glucose Once [[id number]]  (Abnormal) Collected: 10/12/20 1644    Specimen: Blood Updated: 10/12/20 1705     Glucose 153 mg/dL     POC Glucose Once [[id number]]  (Normal) Collected: 10/12/20 1135    Specimen: Blood Updated: 10/12/20 1204     Glucose 128 mg/dL     POC Glucose Once [[id number]]  (Normal) Collected: 10/12/20 0801    Specimen: Blood Updated: 10/12/20 0812     Glucose 114 mg/dL     Basic Metabolic Panel [[id number]]  (Abnormal) Collected: 10/12/20 0334    Specimen: Blood Updated: 10/12/20 0427     Glucose 120 mg/dL      BUN 21 mg/dL      Creatinine 0.88 mg/dL      Sodium 139 mmol/L      Potassium 4.4 mmol/L      Chloride 106 mmol/L      CO2 24.0 mmol/L      Calcium 9.5 mg/dL      eGFR Non African Amer 87 mL/min/1.73      BUN/Creatinine Ratio 23.9     Anion Gap 9.0 mmol/L     Narrative:      GFR Normal >60  Chronic Kidney Disease <60  Kidney Failure <15      CBC (No Diff) [[id number]]  (Abnormal) Collected: 10/12/20 0334    Specimen: Blood Updated: 10/12/20 0410     WBC 10.42 10*3/mm3      RBC 3.47 10*6/mm3      Hemoglobin 9.4 g/dL      Hematocrit 29.3 %      MCV 84.4 fL      MCH 27.1 pg      MCHC 32.1 g/dL      RDW 18.8 %      RDW-SD 57.2 fl      MPV 10.3 fL      Platelets 469 10*3/mm3     POC Glucose Once [[id number]]  (Abnormal) Collected: 10/11/20 2011    Specimen: Blood Updated: 10/11/20 2023     Glucose 193 mg/dL     POC Glucose Once [[id number]]  (Normal) Collected: 10/11/20 1650    Specimen: Blood Updated: 10/11/20 1701     Glucose 124 mg/dL     POC Glucose Once [[id number]]  (Normal) Collected: 10/11/20 1148    Specimen: Blood Updated: 10/11/20 1200     Glucose 106 mg/dL     POC Glucose Once [[id number]]  (Normal) Collected: 10/11/20 0746    Specimen: Blood Updated: 10/11/20 0758     Glucose 93 mg/dL     Vancomycin, Random [[id number]]  (Normal) Collected:  10/11/20 0247    Specimen: Blood Updated: 10/11/20 0351     Vancomycin Random 14.50 mcg/mL     Basic Metabolic Panel [[id number]]  (Abnormal) Collected: 10/11/20 0247    Specimen: Blood Updated: 10/11/20 0346     Glucose 93 mg/dL      BUN 45 mg/dL      Creatinine 1.28 mg/dL      Sodium 138 mmol/L      Potassium 4.2 mmol/L      Chloride 106 mmol/L      CO2 25.0 mmol/L      Calcium 9.9 mg/dL      eGFR Non African Amer 57 mL/min/1.73      BUN/Creatinine Ratio 35.2     Anion Gap 7.0 mmol/L     Narrative:      GFR Normal >60  Chronic Kidney Disease <60  Kidney Failure <15      CBC (No Diff) [[id number]]  (Abnormal) Collected: 10/11/20 0247    Specimen: Blood Updated: 10/11/20 0326     WBC 8.25 10*3/mm3      RBC 3.79 10*6/mm3      Hemoglobin 10.0 g/dL      Hematocrit 32.0 %      MCV 84.4 fL      MCH 26.4 pg      MCHC 31.3 g/dL      RDW 19.1 %      RDW-SD 58.8 fl      MPV 10.3 fL      Platelets 474 10*3/mm3     POC Glucose Once [[id number]]  (Normal) Collected: 10/10/20 2128    Specimen: Blood Updated: 10/10/20 2139     Glucose 118 mg/dL     POC Glucose Once [[id number]]  (Abnormal) Collected: 10/10/20 1609    Specimen: Blood Updated: 10/10/20 1640     Glucose 172 mg/dL     POC Glucose Once [[id number]]  (Normal) Collected: 10/10/20 1225    Specimen: Blood Updated: 10/10/20 1236     Glucose 115 mg/dL     POC Glucose Once [[id number]]  (Abnormal) Collected: 10/10/20 0830    Specimen: Blood Updated: 10/10/20 0841     Glucose 147 mg/dL     aPTT [[id number]]  (Abnormal) Collected: 10/10/20 0556    Specimen: Blood Updated: 10/10/20 0657     PTT 49.3 seconds     Vancomycin, Peak [[id number]]  (Abnormal) Collected: 10/10/20 0556    Specimen: Blood Updated: 10/10/20 0645     Vancomycin Peak 17.10 mcg/mL     Basic Metabolic Panel [[id number]]  (Abnormal) Collected: 10/10/20 0556    Specimen: Blood Updated: 10/10/20 0633     Glucose 131 mg/dL      BUN 71 mg/dL      Creatinine 2.14 mg/dL      Sodium 139 mmol/L      Potassium 4.9 mmol/L       Chloride 104 mmol/L      CO2 22.0 mmol/L      Calcium 10.0 mg/dL      eGFR Non African Amer 31 mL/min/1.73      BUN/Creatinine Ratio 33.2     Anion Gap 13.0 mmol/L     Narrative:      GFR Normal >60  Chronic Kidney Disease <60  Kidney Failure <15      CBC & Differential [[id number]]  (Abnormal) Collected: 10/10/20 0556    Specimen: Blood Updated: 10/10/20 0616    Narrative:      The following orders were created for panel order CBC & Differential.  Procedure                               Abnormality         Status                     ---------                               -----------         ------                     CBC Auto Differential[[id number]]        Abnormal            Final result                 Please view results for these tests on the individual orders.    CBC Auto Differential [[id number]]  (Abnormal) Collected: 10/10/20 0556    Specimen: Blood Updated: 10/10/20 0616     WBC 7.97 10*3/mm3      RBC 3.81 10*6/mm3      Hemoglobin 10.1 g/dL      Hematocrit 32.2 %      MCV 84.5 fL      MCH 26.5 pg      MCHC 31.4 g/dL      RDW 19.2 %      RDW-SD 59.6 fl      MPV 10.2 fL      Platelets 466 10*3/mm3      Neutrophil % 75.2 %      Lymphocyte % 10.2 %      Monocyte % 11.7 %      Eosinophil % 0.8 %      Basophil % 0.5 %      Immature Grans % 1.6 %      Neutrophils, Absolute 6.00 10*3/mm3      Lymphocytes, Absolute 0.81 10*3/mm3      Monocytes, Absolute 0.93 10*3/mm3      Eosinophils, Absolute 0.06 10*3/mm3      Basophils, Absolute 0.04 10*3/mm3      Immature Grans, Absolute 0.13 10*3/mm3      nRBC 0.0 /100 WBC     aPTT [[id number]]  (Abnormal) Collected: 10/09/20 2321    Specimen: Blood Updated: 10/09/20 2355     PTT 38.7 seconds     Hemoglobin A1c [[id number]]  (Abnormal) Collected: 10/09/20 1422    Specimen: Blood Updated: 10/09/20 2223     Hemoglobin A1C 5.80 %     Narrative:      Hemoglobin A1C Ranges:    Increased Risk for Diabetes  5.7% to 6.4%  Diabetes                     >= 6.5%  Diabetic Goal                 < 7.0%    POC Glucose Once [[id number]]  (Normal) Collected: 10/09/20 2124    Specimen: Blood Updated: 10/09/20 2136     Glucose 130 mg/dL     BNP [[id number]]  (Normal) Collected: 10/09/20 1609    Specimen: Blood Updated: 10/09/20 1959     proBNP 230.6 pg/mL     Narrative:      Among patients with dyspnea, NT-proBNP is highly sensitive for the detection of acute congestive heart failure. In addition NT-proBNP of <300 pg/ml effectively rules out acute congestive heart failure with 99% negative predictive value.    Results may be falsely decreased if patient taking Biotin.      aPTT [[id number]]  (Abnormal) Collected: 10/09/20 1901    Specimen: Blood Updated: 10/09/20 1917     PTT 35.2 seconds     Comprehensive Metabolic Panel [[id number]]  (Abnormal) Collected: 10/09/20 1609    Specimen: Blood Updated: 10/09/20 1634     Glucose 104 mg/dL      BUN 73 mg/dL      Creatinine 2.91 mg/dL      Sodium 135 mmol/L      Potassium 5.3 mmol/L      Chloride 102 mmol/L      CO2 21.0 mmol/L      Calcium 9.6 mg/dL      Total Protein 7.3 g/dL      Albumin 3.40 g/dL      ALT (SGPT) 17 U/L      AST (SGOT) 21 U/L      Alkaline Phosphatase 22 U/L      Total Bilirubin 0.3 mg/dL      eGFR Non African Amer 22 mL/min/1.73      Globulin 3.9 gm/dL      A/G Ratio 0.9 g/dL      BUN/Creatinine Ratio 25.1     Anion Gap 12.0 mmol/L     Narrative:      GFR Normal >60  Chronic Kidney Disease <60  Kidney Failure <15      COVID PRE-OP / PRE-PROCEDURE SCREENING ORDER (NO ISOLATION) - Swab, Nasal Cavity [[id number]]  (Normal) Collected: 10/09/20 1424    Specimen: Swab from Nasal Cavity Updated: 10/09/20 1518    Narrative:      The following orders were created for panel order COVID PRE-OP / PRE-PROCEDURE SCREENING ORDER (NO ISOLATION) - Swab, Nasal Cavity.  Procedure                               Abnormality         Status                     ---------                               -----------         ------                     COVID-19, ABBOTT  IN-HOUS...[[id number]]  Normal              Final result                 Please view results for these tests on the individual orders.    COVID-19, ABBOTT IN-HOUSE,NP Swab (NO TRANSPORT MEDIA) 2 HR TAT - Swab, Nasal Cavity [[id number]]  (Normal) Collected: 10/09/20 1424    Specimen: Swab from Nasal Cavity Updated: 10/09/20 1518     COVID19 Not Detected    Narrative:      Fact sheet for providers: https://www.fda.gov/media/554184/download     Fact sheet for patients: https://www.fda.gov/media/940966/download    Lactic Acid, Plasma [[id number]]  (Normal) Collected: 10/09/20 1422    Specimen: Blood Updated: 10/09/20 1449     Lactate 1.4 mmol/L     Protime-INR [[id number]]  (Abnormal) Collected: 10/09/20 1422    Specimen: Blood Updated: 10/09/20 1441     Protime 13.9 Seconds      INR 1.11    aPTT [[id number]]  (Normal) Collected: 10/09/20 1422    Specimen: Blood Updated: 10/09/20 1441     PTT 32.2 seconds     CBC & Differential [[id number]]  (Abnormal) Collected: 10/09/20 1422    Specimen: Blood Updated: 10/09/20 1432    Narrative:      The following orders were created for panel order CBC & Differential.  Procedure                               Abnormality         Status                     ---------                               -----------         ------                     CBC Auto Differential[[id number]]        Abnormal            Final result                 Please view results for these tests on the individual orders.    CBC Auto Differential [[id number]]  (Abnormal) Collected: 10/09/20 1422    Specimen: Blood Updated: 10/09/20 1432     WBC 9.06 10*3/mm3      RBC 3.68 10*6/mm3      Hemoglobin 10.0 g/dL      Hematocrit 31.4 %      MCV 85.3 fL      MCH 27.2 pg      MCHC 31.8 g/dL      RDW 19.4 %      RDW-SD 60.7 fl      MPV 10.6 fL      Platelets 455 10*3/mm3      Neutrophil % 76.7 %      Lymphocyte % 10.0 %      Monocyte % 10.0 %      Eosinophil % 1.0 %      Basophil % 0.3 %      Immature Grans % 2.0 %      Neutrophils,  Absolute 6.94 10*3/mm3      Lymphocytes, Absolute 0.91 10*3/mm3      Monocytes, Absolute 0.91 10*3/mm3      Eosinophils, Absolute 0.09 10*3/mm3      Basophils, Absolute 0.03 10*3/mm3      Immature Grans, Absolute 0.18 10*3/mm3      nRBC 0.0 /100 WBC         Imaging Results (Last 7 Days)     Procedure Component Value Units Date/Time    US Venous Doppler Lower Extremity Bilateral (duplex) [[id number]] Collected: 10/11/20 0907     Updated: 10/11/20 0913    Narrative:      History: Swelling       Impression:      Impression: There is evidence of partially occlusive deep venous  thrombosis of the left superficial femoral vein. All other visualized  veins show no evidence of DVT.     Comments: Bilateral lower extremity venous duplex exam was performed  using color Doppler flow, Doppler waveform analysis, and grayscale  imaging, with and without compression. There is evidence of partially  occlusive DVT in a segment of the left superficial femoral vein.  Otherwise all visualized veins show no evidence of DVT. No thrombus is  identified in the saphenofemoral junctions and greater saphenous veins  bilaterally.         This report was finalized on 10/11/2020 09:10 by Dr. Michael Shaikh MD.    CT Head Without Contrast [[id number]] Collected: 10/09/20 1505     Updated: 10/09/20 1524    Narrative:      EXAMINATION:  CT HEAD WO CONTRAST-  10/9/2020 2:47 PM CDT     HISTORY: Mental status change, unknown cause. The patient had difficulty  holding still for the examination.     TECHNIQUE: Multiple axial images were obtained through the brain without  contrast infusion. Multiplanar images were reconstructed.     DLP: 1674 mGy-cm. Automated dosage control was utilized.     COMPARISON: No comparison study.     FINDINGS: There is mild motion artifact. There are no hemorrhage, edema  or mass effect. 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 The ventricular system is  nondilated. The visualized paranasal sinuses are clear. The mastoid air  cells are  clear. No calvarial fracture is seen.       Impression:      No hemorrhage, edema or mass effect. No acute findings.     The full report of this exam was immediately signed and available to the  emergency room. The patient is currently in the emergency room.     This report was finalized on 10/09/2020 15:07 by Dr. Salinas Morgan MD.    XR Tibia Fibula 2 View Right [[id number]] Collected: 10/09/20 1453     Updated: 10/09/20 1503    Narrative:         Exam:   XR TIBIA FIBULA 2 VW RIGHT-       Date:  10/9/2020      History:  Male, age  65 years;redness, swelling     COMPARISON:  None.     Findings :     There is no acute displaced fracture. No dislocation. No suspicious  lytic or blastic lesion. No radiopaque foreign body.     Vascular calcifications.          Impression:      Impression:     No acute osseous pathology.     This report was finalized on 10/09/2020 14:54 by Dr. Enedina Parmar MD.    XR Tibia Fibula 2 View Left [[id number]] Collected: 10/09/20 1453     Updated: 10/09/20 1503    Narrative:      EXAMINATION:  XR TIBIA FIBULA 2 VW LEFT-  10/9/2020 2:29 PM CDT     HISTORY: Redness and swelling.     COMPARISON: No comparison study.     TECHNIQUE: 2 views were obtained.     FINDINGS:  The left tibia and fibula are intact. Vascular calcification  is noted. There is degenerative osteoarthritis of the left knee. There  is subcutaneous edema throughout the visualized lower leg.       Impression:      1. No acute bony abnormality of the tibia or fibula.  2. Degenerative osteoarthritis left knee.  3. Diffuse subcutaneous edema within the soft tissues. Vascular  calcification.     This report was finalized on 10/09/2020 14:54 by Dr. Salinas Morgan MD.    XR Foot 3+ View Right [[id number]] Collected: 10/09/20 1454     Updated: 10/09/20 1502    Narrative:         Exam:   XR FOOT 3+ VW RIGHT-       Date:  10/9/2020      History:  Male, age  65 years;redness, swelling     COMPARISON:  None.     Findings :  Mild nonspecific  "soft tissue swelling. 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     The Lisfranc joint spacing is within normal limits. No acute finding at  the base of fifth metatarsal bone. Degenerative changes in the foot  distally.        There is no acute displaced fracture. No dislocation. No suspicious  lytic or blastic lesion. No radiopaque foreign body.          Impression:      Impression:     Soft tissue swelling and prior radiation of the first digit, without  evidence of acute osteomyelitis..     This report was finalized on 10/09/2020 14:55 by Dr. Enedina Parmar MD.            Condition on Discharge:    Stable and improving    Physical Exam on Discharge:  /84 (BP Location: Right arm, Patient Position: Sitting)   Pulse 87   Temp 97.6 °F (36.4 °C) (Oral)   Resp 18   Ht 172.7 cm (67.99\")   Wt 116 kg (256 lb 12.8 oz)   SpO2 97%   BMI 39.06 kg/m²   Physical Exam     Constitutional:       Appearance: He is well-developed.      Comments: Sitting up in a chair at bedside with both legs dangling in the floor.  No family present.   HENT:      Head: Normocephalic and atraumatic.   Eyes:      Conjunctiva/sclera: Conjunctivae normal.      Pupils: Pupils are equal, round, and reactive to light.   Neck:      Musculoskeletal: Neck supple.      Vascular: No JVD.   Cardiovascular:      Rate and Rhythm: Normal rate and regular rhythm.      Heart sounds: Normal heart sounds. No murmur. No friction rub. No gallop.    Pulmonary:      Effort: Pulmonary effort is normal. No respiratory distress.      Breath sounds: Normal breath sounds. No wheezing or rales.   Chest:      Chest wall: No tenderness.   Abdominal:      General: Bowel sounds are normal. There is no distension.      Palpations: Abdomen is soft.      Tenderness: There is no abdominal tenderness. There is no guarding or rebound.   Musculoskeletal: Normal range of motion.         General: Swelling (L>R) present. No tenderness or deformity.   Skin:     General: " Skin is warm and dry.  There is a small amount of serous fluid drainage noted from the left lower extremity in the areas of skin breakdown on the anterior portion of the left foot..     Findings: Erythema present bilaterally.  No rash.      Comments: Left lower extremity is dressed with Kerlix and with an Ace wrap overlay.  Neurological:      Mental Status: He is alert and oriented to person, place, and time.      Cranial Nerves: No cranial nerve deficit.      Motor: No abnormal muscle tone.      Deep Tendon Reflexes: Reflexes normal.   Psychiatric:      Comments: He is conversant.       Discharge Disposition:  Skilled Nursing Facility (DC - External)    Discharge Medications:     Discharge Medications      New Medications      Instructions Start Date   apixaban 5 MG tablet tablet  Commonly known as: ELIQUIS   5 mg, Oral, Every 12 Hours Scheduled      influenza vac split quad 0.5 ML suspension prefilled syringe injection  Commonly known as: FLUZONE,FLUARIX,AFLURIA,FLULAVAL   0.5 mL, Intramuscular, Once      lisinopril 10 MG tablet  Commonly known as: PRINIVIL,ZESTRIL   10 mg, Oral, Daily      SITagliptin 100 MG tablet  Commonly known as: Januvia   100 mg, Oral, Daily         Continue These Medications      Instructions Start Date   atorvastatin 80 MG tablet  Commonly known as: LIPITOR   80 mg, Oral, Nightly      clopidogrel 75 MG tablet  Commonly known as: PLAVIX   75 mg, Oral, Daily      fenofibric acid 135 MG capsule delayed-release delayed release capsule  Commonly known as: TRILIPIX   135 mg, Oral, Daily      ferrous sulfate 325 (65 FE) MG tablet   325 mg, Oral, Daily With Breakfast      Insulin Glargine 100 UNIT/ML injection pen  Commonly known as: LANTUS SOLOSTAR   50 Units, Subcutaneous, 2 Times Daily      Jardiance 10 MG tablet  Generic drug: Empagliflozin   10 mg, Oral, Daily      metoprolol tartrate 50 MG tablet  Commonly known as: LOPRESSOR   50 mg, Oral, 2 Times Daily      multivitamin with minerals  tablet tablet   1 tablet, Oral, Daily      omeprazole 20 MG capsule  Commonly known as: priLOSEC   20 mg, Oral, Daily         Stop These Medications    ciprofloxacin 500 MG tablet  Commonly known as: CIPRO     Ecotrin Low Strength 81 MG EC tablet  Generic drug: aspirin     omega-3 acid ethyl esters 1 g capsule  Commonly known as: LOVAZA     SITagliptin-metFORMIN HCl ER  MG tablet  Commonly known as: JANUMET XR     spironolactone 25 MG tablet  Commonly known as: ALDACTONE     triamterene-hydrochlorothiazide 37.5-25 MG per capsule  Commonly known as: DYAZIDE     valsartan 80 MG tablet  Commonly known as: DIOVAN     vitamin D 1.25 MG (31351 UT) capsule capsule  Commonly known as: ERGOCALCIFEROL            Discharge Diet:   Diet Instructions     Diet: Consistent Carbohydrate; Thin      Discharge Diet: Consistent Carbohydrate    Fluid Consistency: Thin          Discharge Care Plan / Instructions:   Discharge to SNF for rehab    Activity at Discharge:   Activity Instructions     Activity as Tolerated             Electronically signed by Salomon Garber DO, 10/14/20, 09:02 CDT.    Time: Discharge over 30 min    Part of this note may be an electronic transcription/translation of spoken language to printed text using the Dragon Dictation system.

## 2020-10-14 NOTE — PLAN OF CARE
Goal Outcome Evaluation:  Plan of Care Reviewed With: patient, spouse  Progress: improving   Pt A&O. Left leg wound oozed a good amount of blood upon dressing change this AM. Pressure was heal for 15min w/ slight drainage continued. Wound care gave recommendations and dressing remains unchanged & CDI for 2 hours after. Infectious disease ok'd to be Dc'd. Pt aware bed w/ not be ready until 4pm at SNF. VSS. Safety maintained. DC today.

## 2020-10-14 NOTE — PLAN OF CARE
Goal Outcome Evaluation:  Plan of Care Reviewed With: patient  Progress: improving  Outcome Summary: Pt c/o left ankle/wound pain, given prn tylenol with relief. Wound care completed to bilateral legs per orders. VSS. Refusing monitor. Room air. Up with assist x2. Encouraged pt to elevate legs. Possible d/c to [facility] today. Safety maintained. Continue to monitor.

## 2020-10-14 NOTE — PROGRESS NOTES
Continued Stay Note  [facility]     Patient Name: Manuel Clark  MRN: [id number]  Today's Date: 10/14/2020    Admit Date: 10/9/2020    Discharge Plan     Row Name 10/14/20 1031       Plan    Plan  [facility]    Patient/Family in Agreement with Plan  yes    Final Discharge Disposition Code  03 - skilled nursing facility (SNF)    Final Note  MELISA verified with Genevieve at [facility] that pt can dc today.  Pt's isolation bed will not be ready until 4:00PM.  Pt's wife plans to transport.  Phone for report:  [phone], fax:  [phone].  BESSY Pratt.    Row Name 10/14/20 0932       Plan    Patient/Family in Agreement with Plan  yes    Final Discharge Disposition Code  03 - skilled nursing facility (SNF)    Row Name 10/14/20 0931       Plan    Patient/Family in Agreement with Plan  yes    Final Discharge Disposition Code  06 - home with home health care        Discharge Codes    No documentation.       Expected Discharge Date and Time     Expected Discharge Date Expected Discharge Time    Oct 14, 2020             BESSY Lowe

## 2020-10-14 NOTE — DISCHARGE PLACEMENT REQUEST
"Manuel Tate (65 y.o. Male)     Date of Birth Social Security Number Address Home Phone MRN    1955  [address] [place] [address] [phone] [id number]    Muslim Marital Status          Other        Admission Date Admission Type Admitting Provider Attending Provider Department, Room/Bed    10/9/20 Emergency Salomon Garber DO Robinson, Maurice S, DO [facility] 4B, 408/1    Discharge Date Discharge Disposition Discharge Destination         Skilled Nursing Facility (DC - External)              Attending Provider: Salomon Garber DO    Allergies: Iodine, Shellfish-derived Products, Sulfa Antibiotics    Isolation: None   Infection: MRSA (05/05/20), MDRO (10/13/20), COVID (rule out) (10/14/20)   Code Status: CPR    Ht: 172.7 cm (67.99\")   Wt: 116 kg (256 lb 12.8 oz)    Admission Cmt: None   Principal Problem: Cellulitis of left leg [L03.116]                 Active Insurance as of 10/9/2020     Primary Coverage     Payor Plan Insurance Group Employer/Plan Group    MEDICARE MEDICARE A & B      Payor Plan Address Payor Plan Phone Number Payor Plan Fax Number Effective Dates    [address] [phone]  1/1/2017 - None Entered    [facility] [id number]       Subscriber Name Subscriber Birth Date Member ID       MANUEL TATE 1955 [id number]           Secondary Coverage     Payor Plan Insurance Group Employer/Plan Group    MISC COMMERCIAL MISC COMMERCIAL      Coverage Address Coverage Phone Number Coverage Fax Number Effective Dates    [address] [phone]  1/1/2018 - None Entered    [facility] [address]       Subscriber Name Subscriber Birth Date Member ID       MANUEL TATE 1955 [id number]                 Emergency Contacts      (Rel.) Home Phone Work Phone Mobile Phone    Jayleen Tate (Spouse) [phone] -- [phone]                 Discharge Summary      Salomon Garber DO at 10/14/20 0851              [facility] Team " [facility] Medicine Services  DISCHARGE SUMMARY       Date of Admission: 10/9/2020  Date of Discharge:  10/14/2020  Primary Care Physician: Kathy Granger APRN    Discharge Diagnoses:  Active Hospital Problems    Diagnosis   • **Cellulitis of left leg   • Essential hypertension   • Coronary artery disease with history of PCI and stent placement   • Acute renal failure (ARF) (CMS/[facility])   • Type 2 diabetes mellitus with hyperglycemia, with long-term current use of insulin (CMS/HCC)   • Diabetic neuropathy (CMS/HCC)   • Acute deep vein thrombosis (DVT) of femoral vein of left lower extremity (CMS/[facility])   • Chronic bilateral low back pain without sciatica   • BMI 40.0-44.9, adult (CMS/[facility])   • Tobacco abuse         Presenting Problem/History of Present Illness:  MONI (acute kidney injury) (CMS/[facility]) [N17.9]     Chief Complaint on Day of Discharge:   Bilateral lower extremity erythema and weakness    History of Present Illness on Day of Discharge:   No new complaints today.  The patient has been accepted to SNF for further wound care and rehabilitation secondary to generalized weakness and gait instability with debilitation.  He is appropriate for discharge today.    Hospital Course  Patient was admitted on 10/9 with complaints of swelling, discomfort and redness of the left lower extremity and lower extremity weakness.  The patient previously had outpatient treatment for cellulitis with clindamycin and ciprofloxacin.  He was found to have a left femoral DVT on duplex in the emergency department and found to be in acute renal failure.  He was initially anticoagulated with Lovenox and was transitioned to oral Eliquis on 10/10.  He was started on vancomycin and Zosyn at the time of admission.  Chronic stasis dermatitis was present.  Infectious diseases was consulted as the patient already had an outpatient appointment scheduled with them.  They recommended discontinuation of antibiotic therapy as there was no clinical  evidence of infectious process.  Recommendations were for leg elevation and compression.  The patient stated that he would not accept in Unna boots any longer.  As such, the patient understands that his situation will be extremely difficult to manage and to improve.  Serum creatinine gradually improved throughout his hospital stay and is now baseline of 0.71.  Dyazide, valsartan and spironolactone were held.  Plavix was continued with full dose anticoagulation given the patient's history of cardiac stenting.  The patient was noncompliant with leg elevation throughout his hospital stay.  He continued to refuse Unna boots.  I eventually convinced the patient to allow the application of Kerlix dressing with Ace overwrap to provide gentle compression.  His legs were improving thereafter.  The patient was unable to ambulate well because of lower extremity weakness likely secondary to lumbar stenosis.  He has appointments with neurosurgery on an outpatient basis and is currently being evaluated by that service.  He is stable for discharge to SNF today for further wound care and rehab.        Consults:   Infectious diseases:  IMPRESSION:  Mild extremity edema/erythema/weeping of serous fluid.  Erythema fairly intense and could be consistent with cellulitis, however patient has normal white count, no fever and reports no chills or fever prior to admission.  Suspect this is more consistent with chronic venous stasis and patient who does not tolerate compression wrapping of his lower extremities, does not elevate his extremities at the level of the heart or even above the level heart.     His skin has various areas that are not intact and that coupled with his severe onychomycosis certainly predisposes him to cellulitis.        RECOMMENDATION:   · ELEVATE LE above the level of heart  · Would not be opposed to stopping vancomycin/zosyn and monitoring patient clinically with elevation of the lower extremities.     Discussed  with nursing.  They will going toSee how to maneuver the new bed to get the feet above the level of the heart.  I also asked the patient to always recline and elevate the foot of the recliner if he is out of bed.     Explained to the patient did not think he will have resolution of this problem at all until he gets control of the LE fluid.  I do not think diuresing alone is the answer, however compression elevation will likely make significant difference.     I would also recommend that he be treated for his onychomycosis with oral Lamisil by his PCP and this could be done as an outpatient so labs could be monitored.              Danitza Hidalgo MD    Pertinent Test Results:   Lab Results (last 7 days)     Procedure Component Value Units Date/Time    POC Glucose Once [[id number]]  (Abnormal) Collected: 10/14/20 0726    Specimen: Blood Updated: 10/14/20 0805     Glucose 149 mg/dL     Basic Metabolic Panel [[id number]]  (Abnormal) Collected: 10/14/20 0502    Specimen: Blood Updated: 10/14/20 0552     Glucose 117 mg/dL      BUN 13 mg/dL      Creatinine 0.64 mg/dL      Sodium 137 mmol/L      Potassium 3.7 mmol/L      Chloride 103 mmol/L      CO2 26.0 mmol/L      Calcium 9.4 mg/dL      eGFR Non African Amer 126 mL/min/1.73      BUN/Creatinine Ratio 20.3     Anion Gap 8.0 mmol/L     Narrative:      GFR Normal >60  Chronic Kidney Disease <60  Kidney Failure <15      POC Glucose Once [[id number]]  (Abnormal) Collected: 10/13/20 1958    Specimen: Blood Updated: 10/13/20 2009     Glucose 177 mg/dL     POC Glucose Once [[id number]]  (Abnormal) Collected: 10/13/20 1545    Specimen: Blood Updated: 10/13/20 1601     Glucose 209 mg/dL     Blood Culture With RONALDO - Blood, Arm, Left [[id number]] Collected: 10/09/20 1422    Specimen: Blood from Arm, Left Updated: 10/13/20 1445     Blood Culture No growth at 4 days    POC Glucose Once [[id number]]  (Abnormal) Collected: 10/13/20 1139    Specimen: Blood Updated: 10/13/20 1151      Glucose 144 mg/dL     Wound Culture - Wound, Leg, Left [[id number]]  (Abnormal)  (Susceptibility) Collected: 10/09/20 1609    Specimen: Wound from Leg, Left Updated: 10/13/20 0820     Wound Culture Light growth (2+) Staphylococcus aureus, MRSA     Comment: Methicillin resistant Staphylococcus aureus, Patient may be an isolation risk.         Scant growth (1+) Achromobacter xylosoxidans      Rare Stenotrophomonas maltophilia     Gram Stain No WBCs or organisms seen    Narrative:      Ceftazidime to follow     Susceptibility      Staphylococcus aureus, MRSA     RACHEL     Clindamycin Resistant     Erythromycin Resistant     Inducible Clindamycin Resistance Negative     Oxacillin Resistant     Penicillin G Resistant     Rifampin Susceptible     Tetracycline Resistant     Trimethoprim + Sulfamethoxazole Susceptible     Vancomycin Susceptible                Susceptibility      Achromobacter xylosoxidans     RACHEL     Cefepime Susceptible     Ceftazidime Susceptible     Gentamicin Resistant     Levofloxacin Intermediate     Meropenem Susceptible     Piperacillin + Tazobactam Susceptible     Tetracycline Resistant     Tobramycin Intermediate                Susceptibility      Stenotrophomonas maltophilia     RACHEL     Levofloxacin Resistant     Trimethoprim + Sulfamethoxazole Resistant                    POC Glucose Once [[id number]]  (Normal) Collected: 10/13/20 0751    Specimen: Blood Updated: 10/13/20 0817     Glucose 102 mg/dL     Basic Metabolic Panel [[id number]]  (Abnormal) Collected: 10/13/20 0429    Specimen: Blood Updated: 10/13/20 0534     Glucose 110 mg/dL      BUN 17 mg/dL      Creatinine 0.71 mg/dL      Sodium 141 mmol/L      Potassium 3.8 mmol/L      Chloride 107 mmol/L      CO2 25.0 mmol/L      Calcium 9.6 mg/dL      eGFR Non African Amer 112 mL/min/1.73      BUN/Creatinine Ratio 23.9     Anion Gap 9.0 mmol/L     Narrative:      GFR Normal >60  Chronic Kidney Disease <60  Kidney Failure <15      POC Glucose Once  [[id number]]  (Normal) Collected: 10/12/20 2014    Specimen: Blood Updated: 10/12/20 2025     Glucose 123 mg/dL     POC Glucose Once [[id number]]  (Abnormal) Collected: 10/12/20 1644    Specimen: Blood Updated: 10/12/20 1705     Glucose 153 mg/dL     POC Glucose Once [[id number]]  (Normal) Collected: 10/12/20 1135    Specimen: Blood Updated: 10/12/20 1204     Glucose 128 mg/dL     POC Glucose Once [[id number]]  (Normal) Collected: 10/12/20 0801    Specimen: Blood Updated: 10/12/20 0812     Glucose 114 mg/dL     Basic Metabolic Panel [[id number]]  (Abnormal) Collected: 10/12/20 0334    Specimen: Blood Updated: 10/12/20 0427     Glucose 120 mg/dL      BUN 21 mg/dL      Creatinine 0.88 mg/dL      Sodium 139 mmol/L      Potassium 4.4 mmol/L      Chloride 106 mmol/L      CO2 24.0 mmol/L      Calcium 9.5 mg/dL      eGFR Non African Amer 87 mL/min/1.73      BUN/Creatinine Ratio 23.9     Anion Gap 9.0 mmol/L     Narrative:      GFR Normal >60  Chronic Kidney Disease <60  Kidney Failure <15      CBC (No Diff) [[id number]]  (Abnormal) Collected: 10/12/20 0334    Specimen: Blood Updated: 10/12/20 0410     WBC 10.42 10*3/mm3      RBC 3.47 10*6/mm3      Hemoglobin 9.4 g/dL      Hematocrit 29.3 %      MCV 84.4 fL      MCH 27.1 pg      MCHC 32.1 g/dL      RDW 18.8 %      RDW-SD 57.2 fl      MPV 10.3 fL      Platelets 469 10*3/mm3     POC Glucose Once [[id number]]  (Abnormal) Collected: 10/11/20 2011    Specimen: Blood Updated: 10/11/20 2023     Glucose 193 mg/dL     POC Glucose Once [[id number]]  (Normal) Collected: 10/11/20 1650    Specimen: Blood Updated: 10/11/20 1701     Glucose 124 mg/dL     POC Glucose Once [[id number]]  (Normal) Collected: 10/11/20 1148    Specimen: Blood Updated: 10/11/20 1200     Glucose 106 mg/dL     POC Glucose Once [[id number]]  (Normal) Collected: 10/11/20 0746    Specimen: Blood Updated: 10/11/20 0758     Glucose 93 mg/dL     Vancomycin, Random [[id number]]  (Normal) Collected: 10/11/20 0247    Specimen: Blood  Updated: 10/11/20 0351     Vancomycin Random 14.50 mcg/mL     Basic Metabolic Panel [[id number]]  (Abnormal) Collected: 10/11/20 0247    Specimen: Blood Updated: 10/11/20 0346     Glucose 93 mg/dL      BUN 45 mg/dL      Creatinine 1.28 mg/dL      Sodium 138 mmol/L      Potassium 4.2 mmol/L      Chloride 106 mmol/L      CO2 25.0 mmol/L      Calcium 9.9 mg/dL      eGFR Non African Amer 57 mL/min/1.73      BUN/Creatinine Ratio 35.2     Anion Gap 7.0 mmol/L     Narrative:      GFR Normal >60  Chronic Kidney Disease <60  Kidney Failure <15      CBC (No Diff) [[id number]]  (Abnormal) Collected: 10/11/20 0247    Specimen: Blood Updated: 10/11/20 0326     WBC 8.25 10*3/mm3      RBC 3.79 10*6/mm3      Hemoglobin 10.0 g/dL      Hematocrit 32.0 %      MCV 84.4 fL      MCH 26.4 pg      MCHC 31.3 g/dL      RDW 19.1 %      RDW-SD 58.8 fl      MPV 10.3 fL      Platelets 474 10*3/mm3     POC Glucose Once [[id number]]  (Normal) Collected: 10/10/20 2128    Specimen: Blood Updated: 10/10/20 2139     Glucose 118 mg/dL     POC Glucose Once [[id number]]  (Abnormal) Collected: 10/10/20 1609    Specimen: Blood Updated: 10/10/20 1640     Glucose 172 mg/dL     POC Glucose Once [[id number]]  (Normal) Collected: 10/10/20 1225    Specimen: Blood Updated: 10/10/20 1236     Glucose 115 mg/dL     POC Glucose Once [[id number]]  (Abnormal) Collected: 10/10/20 0830    Specimen: Blood Updated: 10/10/20 0841     Glucose 147 mg/dL     aPTT [[id number]]  (Abnormal) Collected: 10/10/20 0556    Specimen: Blood Updated: 10/10/20 0657     PTT 49.3 seconds     Vancomycin, Peak [[id number]]  (Abnormal) Collected: 10/10/20 0556    Specimen: Blood Updated: 10/10/20 0645     Vancomycin Peak 17.10 mcg/mL     Basic Metabolic Panel [[id number]]  (Abnormal) Collected: 10/10/20 0556    Specimen: Blood Updated: 10/10/20 0633     Glucose 131 mg/dL      BUN 71 mg/dL      Creatinine 2.14 mg/dL      Sodium 139 mmol/L      Potassium 4.9 mmol/L      Chloride 104 mmol/L      CO2 22.0  mmol/L      Calcium 10.0 mg/dL      eGFR Non African Amer 31 mL/min/1.73      BUN/Creatinine Ratio 33.2     Anion Gap 13.0 mmol/L     Narrative:      GFR Normal >60  Chronic Kidney Disease <60  Kidney Failure <15      CBC & Differential [[id number]]  (Abnormal) Collected: 10/10/20 0556    Specimen: Blood Updated: 10/10/20 0616    Narrative:      The following orders were created for panel order CBC & Differential.  Procedure                               Abnormality         Status                     ---------                               -----------         ------                     CBC Auto Differential[[id number]]        Abnormal            Final result                 Please view results for these tests on the individual orders.    CBC Auto Differential [[id number]]  (Abnormal) Collected: 10/10/20 0556    Specimen: Blood Updated: 10/10/20 0616     WBC 7.97 10*3/mm3      RBC 3.81 10*6/mm3      Hemoglobin 10.1 g/dL      Hematocrit 32.2 %      MCV 84.5 fL      MCH 26.5 pg      MCHC 31.4 g/dL      RDW 19.2 %      RDW-SD 59.6 fl      MPV 10.2 fL      Platelets 466 10*3/mm3      Neutrophil % 75.2 %      Lymphocyte % 10.2 %      Monocyte % 11.7 %      Eosinophil % 0.8 %      Basophil % 0.5 %      Immature Grans % 1.6 %      Neutrophils, Absolute 6.00 10*3/mm3      Lymphocytes, Absolute 0.81 10*3/mm3      Monocytes, Absolute 0.93 10*3/mm3      Eosinophils, Absolute 0.06 10*3/mm3      Basophils, Absolute 0.04 10*3/mm3      Immature Grans, Absolute 0.13 10*3/mm3      nRBC 0.0 /100 WBC     aPTT [[id number]]  (Abnormal) Collected: 10/09/20 2321    Specimen: Blood Updated: 10/09/20 2355     PTT 38.7 seconds     Hemoglobin A1c [[id number]]  (Abnormal) Collected: 10/09/20 1422    Specimen: Blood Updated: 10/09/20 2223     Hemoglobin A1C 5.80 %     Narrative:      Hemoglobin A1C Ranges:    Increased Risk for Diabetes  5.7% to 6.4%  Diabetes                     >= 6.5%  Diabetic Goal                < 7.0%    POC Glucose Once  [[id number]]  (Normal) Collected: 10/09/20 2124    Specimen: Blood Updated: 10/09/20 2136     Glucose 130 mg/dL     BNP [[id number]]  (Normal) Collected: 10/09/20 1609    Specimen: Blood Updated: 10/09/20 1959     proBNP 230.6 pg/mL     Narrative:      Among patients with dyspnea, NT-proBNP is highly sensitive for the detection of acute congestive heart failure. In addition NT-proBNP of <300 pg/ml effectively rules out acute congestive heart failure with 99% negative predictive value.    Results may be falsely decreased if patient taking Biotin.      aPTT [[id number]]  (Abnormal) Collected: 10/09/20 1901    Specimen: Blood Updated: 10/09/20 1917     PTT 35.2 seconds     Comprehensive Metabolic Panel [[id number]]  (Abnormal) Collected: 10/09/20 1609    Specimen: Blood Updated: 10/09/20 1634     Glucose 104 mg/dL      BUN 73 mg/dL      Creatinine 2.91 mg/dL      Sodium 135 mmol/L      Potassium 5.3 mmol/L      Chloride 102 mmol/L      CO2 21.0 mmol/L      Calcium 9.6 mg/dL      Total Protein 7.3 g/dL      Albumin 3.40 g/dL      ALT (SGPT) 17 U/L      AST (SGOT) 21 U/L      Alkaline Phosphatase 22 U/L      Total Bilirubin 0.3 mg/dL      eGFR Non African Amer 22 mL/min/1.73      Globulin 3.9 gm/dL      A/G Ratio 0.9 g/dL      BUN/Creatinine Ratio 25.1     Anion Gap 12.0 mmol/L     Narrative:      GFR Normal >60  Chronic Kidney Disease <60  Kidney Failure <15      COVID PRE-OP / PRE-PROCEDURE SCREENING ORDER (NO ISOLATION) - Swab, Nasal Cavity [[id number]]  (Normal) Collected: 10/09/20 1424    Specimen: Swab from Nasal Cavity Updated: 10/09/20 1518    Narrative:      The following orders were created for panel order COVID PRE-OP / PRE-PROCEDURE SCREENING ORDER (NO ISOLATION) - Swab, Nasal Cavity.  Procedure                               Abnormality         Status                     ---------                               -----------         ------                     COVID-19, ABBOTT IN-HOUS...[[id number]]  Normal               Final result                 Please view results for these tests on the individual orders.    COVID-19, ABBOTT IN-HOUSE,NP Swab (NO TRANSPORT MEDIA) 2 HR TAT - Swab, Nasal Cavity [[id number]]  (Normal) Collected: 10/09/20 1424    Specimen: Swab from Nasal Cavity Updated: 10/09/20 1518     COVID19 Not Detected    Narrative:      Fact sheet for providers: https://www.fda.gov/media/498324/download     Fact sheet for patients: https://www.fda.gov/media/188834/download    Lactic Acid, Plasma [[id number]]  (Normal) Collected: 10/09/20 1422    Specimen: Blood Updated: 10/09/20 1449     Lactate 1.4 mmol/L     Protime-INR [[id number]]  (Abnormal) Collected: 10/09/20 1422    Specimen: Blood Updated: 10/09/20 1441     Protime 13.9 Seconds      INR 1.11    aPTT [[id number]]  (Normal) Collected: 10/09/20 1422    Specimen: Blood Updated: 10/09/20 1441     PTT 32.2 seconds     CBC & Differential [[id number]]  (Abnormal) Collected: 10/09/20 1422    Specimen: Blood Updated: 10/09/20 1432    Narrative:      The following orders were created for panel order CBC & Differential.  Procedure                               Abnormality         Status                     ---------                               -----------         ------                     CBC Auto Differential[[id number]]        Abnormal            Final result                 Please view results for these tests on the individual orders.    CBC Auto Differential [[id number]]  (Abnormal) Collected: 10/09/20 1422    Specimen: Blood Updated: 10/09/20 1432     WBC 9.06 10*3/mm3      RBC 3.68 10*6/mm3      Hemoglobin 10.0 g/dL      Hematocrit 31.4 %      MCV 85.3 fL      MCH 27.2 pg      MCHC 31.8 g/dL      RDW 19.4 %      RDW-SD 60.7 fl      MPV 10.6 fL      Platelets 455 10*3/mm3      Neutrophil % 76.7 %      Lymphocyte % 10.0 %      Monocyte % 10.0 %      Eosinophil % 1.0 %      Basophil % 0.3 %      Immature Grans % 2.0 %      Neutrophils, Absolute 6.94 10*3/mm3      Lymphocytes,  Absolute 0.91 10*3/mm3      Monocytes, Absolute 0.91 10*3/mm3      Eosinophils, Absolute 0.09 10*3/mm3      Basophils, Absolute 0.03 10*3/mm3      Immature Grans, Absolute 0.18 10*3/mm3      nRBC 0.0 /100 WBC         Imaging Results (Last 7 Days)     Procedure Component Value Units Date/Time    US Venous Doppler Lower Extremity Bilateral (duplex) [[id number]] Collected: 10/11/20 0907     Updated: 10/11/20 0913    Narrative:      History: Swelling       Impression:      Impression: There is evidence of partially occlusive deep venous  thrombosis of the left superficial femoral vein. All other visualized  veins show no evidence of DVT.     Comments: Bilateral lower extremity venous duplex exam was performed  using color Doppler flow, Doppler waveform analysis, and grayscale  imaging, with and without compression. There is evidence of partially  occlusive DVT in a segment of the left superficial femoral vein.  Otherwise all visualized veins show no evidence of DVT. No thrombus is  identified in the saphenofemoral junctions and greater saphenous veins  bilaterally.         This report was finalized on 10/11/2020 09:10 by Dr. Michael Shaikh MD.    CT Head Without Contrast [[id number]] Collected: 10/09/20 1505     Updated: 10/09/20 1524    Narrative:      EXAMINATION:  CT HEAD WO CONTRAST-  10/9/2020 2:47 PM CDT     HISTORY: Mental status change, unknown cause. The patient had difficulty  holding still for the examination.     TECHNIQUE: Multiple axial images were obtained through the brain without  contrast infusion. Multiplanar images were reconstructed.     DLP: 1674 mGy-cm. Automated dosage control was utilized.     COMPARISON: No comparison study.     FINDINGS: There is mild motion artifact. There are no hemorrhage, edema  or mass effect. 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 The ventricular system is  nondilated. The visualized paranasal sinuses are clear. The mastoid air  cells are clear. No calvarial fracture is seen.          Impression:      No hemorrhage, edema or mass effect. No acute findings.     The full report of this exam was immediately signed and available to the  emergency room. The patient is currently in the emergency room.     This report was finalized on 10/09/2020 15:07 by Dr. Salinas Morgan MD.    XR Tibia Fibula 2 View Right [[id number]] Collected: 10/09/20 1453     Updated: 10/09/20 1503    Narrative:         Exam:   XR TIBIA FIBULA 2 VW RIGHT-       Date:  10/9/2020      History:  Male, age  65 years;redness, swelling     COMPARISON:  None.     Findings :     There is no acute displaced fracture. No dislocation. No suspicious  lytic or blastic lesion. No radiopaque foreign body.     Vascular calcifications.          Impression:      Impression:     No acute osseous pathology.     This report was finalized on 10/09/2020 14:54 by Dr. Enedina Parmar MD.    XR Tibia Fibula 2 View Left [[id number]] Collected: 10/09/20 1453     Updated: 10/09/20 1503    Narrative:      EXAMINATION:  XR TIBIA FIBULA 2 VW LEFT-  10/9/2020 2:29 PM CDT     HISTORY: Redness and swelling.     COMPARISON: No comparison study.     TECHNIQUE: 2 views were obtained.     FINDINGS:  The left tibia and fibula are intact. Vascular calcification  is noted. There is degenerative osteoarthritis of the left knee. There  is subcutaneous edema throughout the visualized lower leg.       Impression:      1. No acute bony abnormality of the tibia or fibula.  2. Degenerative osteoarthritis left knee.  3. Diffuse subcutaneous edema within the soft tissues. Vascular  calcification.     This report was finalized on 10/09/2020 14:54 by Dr. Salinas Morgan MD.    XR Foot 3+ View Right [[id number]] Collected: 10/09/20 1454     Updated: 10/09/20 1502    Narrative:         Exam:   XR FOOT 3+ VW RIGHT-       Date:  10/9/2020      History:  Male, age  65 years;redness, swelling     COMPARISON:  None.     Findings :  Mild nonspecific soft tissue swelling. Prior amputation at  "the first  proximal phalanx.     The Lisfranc joint spacing is within normal limits. No acute finding at  the base of fifth metatarsal bone. Degenerative changes in the foot  distally.        There is no acute displaced fracture. No dislocation. No suspicious  lytic or blastic lesion. No radiopaque foreign body.          Impression:      Impression:     Soft tissue swelling and prior radiation of the first digit, without  evidence of acute osteomyelitis..     This report was finalized on 10/09/2020 14:55 by Dr. Enedina Parmar MD.            Condition on Discharge:    Stable and improving    Physical Exam on Discharge:  /84 (BP Location: Right arm, Patient Position: Sitting)   Pulse 87   Temp 97.6 °F (36.4 °C) (Oral)   Resp 18   Ht 172.7 cm (67.99\")   Wt 116 kg (256 lb 12.8 oz)   SpO2 97%   BMI 39.06 kg/m²   Physical Exam     Constitutional:       Appearance: He is well-developed.      Comments: Sitting up in a chair at bedside with both legs dangling in the floor.  No family present.   HENT:      Head: Normocephalic and atraumatic.   Eyes:      Conjunctiva/sclera: Conjunctivae normal.      Pupils: Pupils are equal, round, and reactive to light.   Neck:      Musculoskeletal: Neck supple.      Vascular: No JVD.   Cardiovascular:      Rate and Rhythm: Normal rate and regular rhythm.      Heart sounds: Normal heart sounds. No murmur. No friction rub. No gallop.    Pulmonary:      Effort: Pulmonary effort is normal. No respiratory distress.      Breath sounds: Normal breath sounds. No wheezing or rales.   Chest:      Chest wall: No tenderness.   Abdominal:      General: Bowel sounds are normal. There is no distension.      Palpations: Abdomen is soft.      Tenderness: There is no abdominal tenderness. There is no guarding or rebound.   Musculoskeletal: Normal range of motion.         General: Swelling (L>R) present. No tenderness or deformity.   Skin:     General: Skin is warm and dry.  There is a small " amount of serous fluid drainage noted from the left lower extremity in the areas of skin breakdown on the anterior portion of the left foot..     Findings: Erythema present bilaterally.  No rash.      Comments: Left lower extremity is dressed with Kerlix and with an Ace wrap overlay.  Neurological:      Mental Status: He is alert and oriented to person, place, and time.      Cranial Nerves: No cranial nerve deficit.      Motor: No abnormal muscle tone.      Deep Tendon Reflexes: Reflexes normal.   Psychiatric:      Comments: He is conversant.       Discharge Disposition:  Skilled Nursing Facility (DC - External)    Discharge Medications:     Discharge Medications      New Medications      Instructions Start Date   apixaban 5 MG tablet tablet  Commonly known as: ELIQUIS   5 mg, Oral, Every 12 Hours Scheduled      influenza vac split quad 0.5 ML suspension prefilled syringe injection  Commonly known as: FLUZONE,FLUARIX,AFLURIA,FLULAVAL   0.5 mL, Intramuscular, Once      lisinopril 10 MG tablet  Commonly known as: PRINIVIL,ZESTRIL   10 mg, Oral, Daily      SITagliptin 100 MG tablet  Commonly known as: Januvia   100 mg, Oral, Daily         Continue These Medications      Instructions Start Date   atorvastatin 80 MG tablet  Commonly known as: LIPITOR   80 mg, Oral, Nightly      clopidogrel 75 MG tablet  Commonly known as: PLAVIX   75 mg, Oral, Daily      fenofibric acid 135 MG capsule delayed-release delayed release capsule  Commonly known as: TRILIPIX   135 mg, Oral, Daily      ferrous sulfate 325 (65 FE) MG tablet   325 mg, Oral, Daily With Breakfast      Insulin Glargine 100 UNIT/ML injection pen  Commonly known as: LANTUS SOLOSTAR   50 Units, Subcutaneous, 2 Times Daily      Jardiance 10 MG tablet  Generic drug: Empagliflozin   10 mg, Oral, Daily      metoprolol tartrate 50 MG tablet  Commonly known as: LOPRESSOR   50 mg, Oral, 2 Times Daily      multivitamin with minerals tablet tablet   1 tablet, Oral, Daily         omeprazole 20 MG capsule  Commonly known as: priLOSEC   20 mg, Oral, Daily         Stop These Medications    ciprofloxacin 500 MG tablet  Commonly known as: CIPRO     Ecotrin Low Strength 81 MG EC tablet  Generic drug: aspirin     omega-3 acid ethyl esters 1 g capsule  Commonly known as: LOVAZA     SITagliptin-metFORMIN HCl ER  MG tablet  Commonly known as: JANUMET XR     spironolactone 25 MG tablet  Commonly known as: ALDACTONE     triamterene-hydrochlorothiazide 37.5-25 MG per capsule  Commonly known as: DYAZIDE     valsartan 80 MG tablet  Commonly known as: DIOVAN     vitamin D 1.25 MG (12303 UT) capsule capsule  Commonly known as: ERGOCALCIFEROL            Discharge Diet:   Diet Instructions     Diet: Consistent Carbohydrate; Thin      Discharge Diet: Consistent Carbohydrate    Fluid Consistency: Thin          Discharge Care Plan / Instructions:   Discharge to SNF for rehab    Activity at Discharge:   Activity Instructions     Activity as Tolerated             Electronically signed by Salomon Byrnes DO, 10/14/20, 09:02 CDT.    Time: Discharge over 30 min    Part of this note may be an electronic transcription/translation of spoken language to printed text using the Dragon Dictation system.        Electronically signed by Salomon Byrnes DO at 10/14/20 0906       Discharge Order (From admission, onward)     Start     Ordered    10/14/20 0855  Discharge patient  Once     Expected Discharge Date: 10/14/20    Discharge Disposition: Skilled Nursing Facility (DC - External)    Physician of Record for Attribution - Please select from Treatment Team: SALOMON BYRNES [[id number]]    Review needed by CMO to determine Physician of Record: No       Question Answer Comment   Physician of Record for Attribution - Please select from Treatment Team SALOMON BYRNES    Review needed by CMO to determine Physician of Record No        10/14/20 0859

## 2020-10-15 NOTE — THERAPY DISCHARGE NOTE
Acute Care - Occupational Therapy Discharge Summary  [facility]     Patient Name: Manuel Clark  : 1955  MRN: [id number]    Today's Date: 10/15/2020                 Admit Date: 10/9/2020        OT Recommendation and Plan    Visit Dx:    ICD-10-CM ICD-9-CM   1. MONI (acute kidney injury) (CMS/[facility])  N17.9 584.9   2. Cellulitis of lower extremity, unspecified laterality  L03.119 682.6   3. Acute deep vein thrombosis (DVT) of femoral vein of left lower extremity (CMS/[facility])  I82.412 453.41   4. Impaired mobility and ADLs  Z74.09 V49.89    Z78.9    5. Decreased activities of daily living (ADL)  Z78.9 V49.89   6. Impaired functional mobility, balance, gait, and endurance  Z74.09 V49.89               Rehab Goal Summary     Row Name 10/15/20 0700             Bathing Goal 1 (OT)    Activity/Device (Bathing Goal 1, OT)  upper body bathing;long-handled sponge  -TS      [place] Level/Cues Needed (Bathing Goal 1, OT)  minimum assist (75% or more patient effort);verbal cues required;tactile cues required;set-up required  -TS      Time Frame (Bathing Goal 1, OT)  long term goal (LTG);by discharge  -TS      Progress/Outcomes (Bathing Goal 1, OT)  goal not met  -TS         Dressing Goal 1 (OT)    Activity/Device (Dressing Goal 1, OT)  upper body dressing  -TS      [place]/Cues Needed (Dressing Goal 1, OT)  modified independence;set-up required  -TS      Time Frame (Dressing Goal 1, OT)  long term goal (LTG);by discharge  -TS      Progress/Outcome (Dressing Goal 1, OT)  goal not met  -TS         Toileting Goal 1 (OT)    Activity/Device (Toileting Goal 1, OT)  toileting skills, all;commode, bedside without drop arms;commode, bedside with drop arms  -TS      [place] Level/Cues Needed (Toileting Goal 1, OT)  moderate assist (50-74% patient effort);2 person assist;tactile cues required;set-up required;verbal cues required  -TS      Time Frame (Toileting Goal 1, OT)  long term goal (LTG);by discharge  -TS       Progress/Outcome (Toileting Goal 1, OT)  goal not met  -TS        User Key  (r) = Recorded By, (t) = Taken By, (c) = Cosigned By    Initials Name Provider Type Discipline    TS Hortencia Oneil COTA/L Occupational Therapy Assistant OT              Timed Therapy Charges  Total Units: 1    Charges  Total Units: 1    Procedure Name Documented Minutes Units Code    HC OT SELF CARE/MGMT/TRAIN EA 15 MIN 9  1    52812 (CPT®)               Documented Minutes  Total Minutes: 9    Therapy Provided Minutes    35039 - OT Self Care/Mgmt Minutes 9                    OT Discharge Summary  Anticipated Discharge Disposition (OT): skilled nursing facility  Reason for Discharge: Discharge from facility  Outcomes Achieved: Refer to plan of care for updates on goals achieved  Discharge Destination: SNF      EMILIE Gaytan  10/15/2020

## 2020-10-15 NOTE — THERAPY DISCHARGE NOTE
Acute Care - Physical Therapy Discharge Summary  [facility]       Patient Name: Manuel Clark  : 1955  MRN: [id number]    Today's Date: 10/15/2020                 Admit Date: 10/9/2020      PT Recommendation and Plan    Visit Dx:    ICD-10-CM ICD-9-CM   1. MONI (acute kidney injury) (CMS/[facility])  N17.9 584.9   2. Cellulitis of lower extremity, unspecified laterality  L03.119 682.6   3. Acute deep vein thrombosis (DVT) of femoral vein of left lower extremity (CMS/[facility])  I82.412 453.41   4. Impaired mobility and ADLs  Z74.09 V49.89    Z78.9    5. Decreased activities of daily living (ADL)  Z78.9 V49.89   6. Impaired functional mobility, balance, gait, and endurance  Z74.09 V49.89               Rehab Goal Summary     Row Name 10/15/20 0741 10/15/20 0700          Bed Mobility Goal 1 (PT)    Activity/Assistive Device (Bed Mobility Goal 1, PT)  bed mobility activities, all  -MF  --     [place] Level/Cues Needed (Bed Mobility Goal 1, PT)  independent  -MF  --     Time Frame (Bed Mobility Goal 1, PT)  long term goal (LTG);by discharge  -MF  --     Progress/Outcomes (Bed Mobility Goal 1, PT)  goal not met  -MF  --        Transfer Goal 1 (PT)    Activity/Assistive Device (Transfer Goal 1, PT)  sit-to-stand/stand-to-sit;bed-to-chair/chair-to-bed;walker, rolling  -MF  --     [place] Level/Cues Needed (Transfer Goal 1, PT)  standby assist  -MF  --     Time Frame (Transfer Goal 1, PT)  long term goal (LTG);by discharge  -MF  --     Progress/Outcome (Transfer Goal 1, PT)  goal not met;goal revised this date  -MF  --        Gait Training Goal 1 (PT)    Activity/Assistive Device (Gait Training Goal 1, PT)  gait (walking locomotion);walker, rolling  -MF  --     [place] Level (Gait Training Goal 1, PT)  contact guard assist  -MF  --     Distance (Gait Training Goal 1, PT)  25'  -MF  --     Time Frame (Gait Training Goal 1, PT)  long term goal (LTG);by discharge  -MF  --     Progress/Outcome (Gait Training Goal 1, PT)   goal partially met  -  --        Bathing Goal 1 (OT)    Activity/Device (Bathing Goal 1, OT)  --  upper body bathing;long-handled sponge  -TS     [place] Level/Cues Needed (Bathing Goal 1, OT)  --  minimum assist (75% or more patient effort);verbal cues required;tactile cues required;set-up required  -TS     Time Frame (Bathing Goal 1, OT)  --  long term goal (LTG);by discharge  -TS     Progress/Outcomes (Bathing Goal 1, OT)  --  goal not met  -TS        Dressing Goal 1 (OT)    Activity/Device (Dressing Goal 1, OT)  --  upper body dressing  -TS     [place]/Cues Needed (Dressing Goal 1, OT)  --  modified independence;set-up required  -TS     Time Frame (Dressing Goal 1, OT)  --  long term goal (LTG);by discharge  -TS     Progress/Outcome (Dressing Goal 1, OT)  --  goal not met  -TS        Toileting Goal 1 (OT)    Activity/Device (Toileting Goal 1, OT)  --  toileting skills, all;commode, bedside without drop arms;commode, bedside with drop arms  -TS     [place] Level/Cues Needed (Toileting Goal 1, OT)  --  moderate assist (50-74% patient effort);2 person assist;tactile cues required;set-up required;verbal cues required  -TS     Time Frame (Toileting Goal 1, OT)  --  long term goal (LTG);by discharge  -TS     Progress/Outcome (Toileting Goal 1, OT)  --  goal not met  -TS       User Key  (r) = Recorded By, (t) = Taken By, (c) = Cosigned By    Initials Name Provider Type Discipline    TS Hortencia Oneil, LAST/L Occupational Therapy Assistant OT    Debo Arias, PTA Physical Therapy Assistant PT              PT Discharge Summary  Anticipated Discharge Disposition (PT): skilled nursing facility  Reason for Discharge: Discharge from facility  Outcomes Achieved: Unable to make functional progress toward goals at this time  Discharge Destination: [facility]      Debo Strong PTA   10/15/2020

## 2020-11-06 ENCOUNTER — TRANSCRIBE ORDERS (OUTPATIENT)
Dept: ADMINISTRATIVE | Facility: HOSPITAL | Age: 65
End: 2020-11-06

## 2020-11-06 DIAGNOSIS — M62.81 ACUTE LEFT-SIDED MUSCLE WEAKNESS: Primary | ICD-10-CM

## 2021-03-19 ENCOUNTER — BULK ORDERING (OUTPATIENT)
Dept: CASE MANAGEMENT | Facility: OTHER | Age: 66
End: 2021-03-19

## 2021-03-19 DIAGNOSIS — Z23 IMMUNIZATION DUE: ICD-10-CM
